# Patient Record
Sex: MALE | Race: WHITE
[De-identification: names, ages, dates, MRNs, and addresses within clinical notes are randomized per-mention and may not be internally consistent; named-entity substitution may affect disease eponyms.]

---

## 2017-01-08 ENCOUNTER — HOSPITAL ENCOUNTER (INPATIENT)
Dept: HOSPITAL 47 - EC | Age: 52
LOS: 11 days | Discharge: HOME | DRG: 885 | End: 2017-01-19
Payer: MEDICARE

## 2017-01-08 VITALS — BODY MASS INDEX: 31 KG/M2

## 2017-01-08 DIAGNOSIS — Z82.49: ICD-10-CM

## 2017-01-08 DIAGNOSIS — G47.30: ICD-10-CM

## 2017-01-08 DIAGNOSIS — F25.1: Primary | ICD-10-CM

## 2017-01-08 DIAGNOSIS — G47.00: ICD-10-CM

## 2017-01-08 DIAGNOSIS — K21.9: ICD-10-CM

## 2017-01-08 DIAGNOSIS — F42.9: ICD-10-CM

## 2017-01-08 DIAGNOSIS — F10.21: ICD-10-CM

## 2017-01-08 DIAGNOSIS — E11.9: ICD-10-CM

## 2017-01-08 DIAGNOSIS — R45.851: ICD-10-CM

## 2017-01-08 DIAGNOSIS — E78.5: ICD-10-CM

## 2017-01-08 DIAGNOSIS — E66.9: ICD-10-CM

## 2017-01-08 DIAGNOSIS — Z91.5: ICD-10-CM

## 2017-01-08 DIAGNOSIS — Z79.84: ICD-10-CM

## 2017-01-08 DIAGNOSIS — F41.9: ICD-10-CM

## 2017-01-08 DIAGNOSIS — G25.81: ICD-10-CM

## 2017-01-08 DIAGNOSIS — Z88.0: ICD-10-CM

## 2017-01-08 DIAGNOSIS — D64.9: ICD-10-CM

## 2017-01-08 DIAGNOSIS — I10: ICD-10-CM

## 2017-01-08 DIAGNOSIS — Z79.899: ICD-10-CM

## 2017-01-08 DIAGNOSIS — Z88.8: ICD-10-CM

## 2017-01-08 LAB
APAP SPEC-MCNC: <10 UG/ML
SALICYLATES SERPL-MCNC: <1 MG/DL

## 2017-01-08 PROCEDURE — 85025 COMPLETE CBC W/AUTO DIFF WBC: CPT

## 2017-01-08 PROCEDURE — 36415 COLL VENOUS BLD VENIPUNCTURE: CPT

## 2017-01-08 PROCEDURE — 83036 HEMOGLOBIN GLYCOSYLATED A1C: CPT

## 2017-01-08 PROCEDURE — 80306 DRUG TEST PRSMV INSTRMNT: CPT

## 2017-01-08 PROCEDURE — 84443 ASSAY THYROID STIM HORMONE: CPT

## 2017-01-08 PROCEDURE — 99285 EMERGENCY DEPT VISIT HI MDM: CPT

## 2017-01-08 PROCEDURE — 80053 COMPREHEN METABOLIC PANEL: CPT

## 2017-01-08 PROCEDURE — 82075 ASSAY OF BREATH ETHANOL: CPT

## 2017-01-08 PROCEDURE — 80178 ASSAY OF LITHIUM: CPT

## 2017-01-08 PROCEDURE — 93005 ELECTROCARDIOGRAM TRACING: CPT

## 2017-01-08 PROCEDURE — 83520 IMMUNOASSAY QUANT NOS NONAB: CPT

## 2017-01-08 NOTE — ED
General Adult HPI





- General


Chief complaint: Psychiatric Symptoms


Stated complaint: Mental Health-Petition


Time Seen by Provider: 01/08/17 19:35


Source: patient, police, EMS, RN notes reviewed, old records reviewed


Mode of arrival: EMS


Limitations: no limitations





- History of Present Illness


Initial comments: 





Chief complaint history of present illness a 51-year-old male here because he 

is depressed.  Patient's suicidal.  He had a knife to his own chest.  He called 

and spoke to assist her she told him not to hurt himself she called the police 

the police brought him here.  I have completed a certificate for admission 

because of the patient's complaint of being depressed and suicidal.  Patient 

denies taking any medications other than what is supposed to take.





- Related Data


 Home Medications











 Medication  Instructions  Recorded  Confirmed


 


Ferrous Sulfate [Iron (65  mg PO DAILY 01/08/17 01/08/17





Elemental)]   


 


Fluticasone Nasal Spray [Flonase 2 spr EA NOSTRIL DAILY 01/08/17 01/08/17





Nasal Spray]   


 


Loratadine 10 mg PO DAILY PRN 01/08/17 01/08/17


 


Propranolol [Inderal] 20 mg PO BID 01/08/17 01/08/17


 


Rosuvastatin Calcium [Crestor] 5 mg PO HS 01/08/17 01/08/17


 


busPIRone HCL [Buspar] 30 mg PO BID 01/08/17 01/08/17


 


cloZAPine [Clozaril] 100 mg PO HS 01/08/17 01/08/17


 


fluvoxaMINE MALEATE [Luvox CR] 150 mg PO BID 01/08/17 01/08/17


 


lamoTRIgine [LaMICtal] 100 mg PO DAILY 01/08/17 01/08/17


 


lamoTRIgine [LaMICtal] 150 mg PO HS 01/08/17 01/08/17


 


metFORMIN HCL [Glucophage] 500 mg PO BID 01/08/17 01/08/17


 


rOPINIRole HCL [Requip] 0.5 mg PO HS 01/08/17 01/08/17








 Previous Rx's











 Medication  Instructions  Recorded


 


Vivitrol  380 mg IM Q28D #1  01/20/16


 


Benztropine Mesylate [Cogentin] 1 mg PO HS #30 tab 10/19/16


 


Lisinopril [Zestril] 20 mg PO HS #30 tab 10/19/16


 


Ranitidine HCl [Zantac] 150 mg PO BID #60 tablet 10/19/16


 


amLODIPine [Norvasc] 5 mg PO BID #30 tab 10/19/16











 Allergies











Allergy/AdvReac Type Severity Reaction Status Date / Time


 


Penicillins Allergy  Unknown Verified 01/08/17 19:33





   Childhood  


 


risperidone [From Risperdal] Allergy  Unknown Verified 01/08/17 19:33














Review of Systems


ROS Statement: 


Those systems with pertinent positive or pertinent negative responses have been 

documented in the HPI.


Review of systems.  No complaint of any headache or visual acuity changes no 

chest pain or shortness of breath.  No GI/ complaints or problems.  

Psychologically the patient reports depressed and suicidal to the point where 

he had a knife to himself.  All systems were otherwise reviewed.





Past medical problems significant for non-insulin diabetes mellitus, GERD, 

hyperlipidemia hypertension sleep apnea.  The patient's surgeries hernia 

repair.  Family history father had heart attack and die.  Patient has ALLERGIES 

to risperidone and penicillin.  Patient nonsmoker nondrinker.


ROS Other: All systems not noted in ROS Statement are negative.





Past Medical History


Past Medical History: Diabetes Mellitus, GERD/Reflux, Hyperlipidemia, 

Hypertension, Sleep Apnea/CPAP/BIPAP


Additional Past Medical History / Comment(s): Family history of premature 

coronary artery disease. OCD.


History of Any Multi-Drug Resistant Organisms: None Reported


Past Surgical History: Hernia Repair


Past Anesthesia/Blood Transfusion Reactions: No Reported Reaction


Past Psychological History: Anxiety, Bipolar, Depression, Schizoaffective 

Disorder


Additional Psychological History / Comment(s): OCD


Smoking Status: Never smoker


Past Alcohol Use History: None Reported


Additional Past Alcohol Use History / Comment(s): Pt states, "I am a recovering 

alcoholic. I haven't had a drink in since Jan 2014."


Past Drug Use History: None Reported


Additional Drug Use History / Comment(s): denies substance abuse





- Past Family History


  ** Father


Family Medical History: Congestive Heart Failure (CHF), Diabetes Mellitus





  ** Mother


Family Medical History: Cancer





General Exam





- General Exam Comments


Initial Comments: 





General:


The patient is awake and alert, appears depressed, quiet.  He had a knife to 

his own chest threatening to hurt himself.  His sister talked him out of it 

while on the phone with her.  Vital signs show temperature 98.0 pulse 88 

respiratory rate 20 blood pressure 156/95.  Systolic eye stuck elevated the 

patient is distressed.


Eye:


Pupils are equal, round and reactive to light, extra-ocular movements are intact

; there is normal conjunctiva bilaterally. No signs of icterus. 


Ears, nose, mouth and throat:


There are moist mucous membranes and no oral lesions. 


Neck:


The neck is supple, there is no tenderness  . 


Cardiovascular:


There is a regular rate and rhythm. No murmur, rub or gallop is appreciated.


Respiratory:


Lungs are clear to auscultation, respirations are non-labored, breath sounds 

are equal. No wheezes, stridor, rales, or rhonchi.


Gastrointestinal:


Soft, non-distended, non-tender abdomen without masses or organomegaly noted. 

There is no rebound or guarding present. No CVA tenderness. Bowel sounds are 

unremarkable.


Back:


There is no tenderness to palpation in the midline. There is no obvious 

deformity. No rashes noted. 


Musculoskeletal:


Normal ROM, no tenderness, There is no pedal edema. There is no calf tenderness 

or swelling. Sensation intact. Pulses equal bilaterally 2+. 


Neurological:


CN II-XII intact, There are no obvious motor or sensory deficits. Coordination 

appears grossly intact. Speech is normal.


Skin:


Skin is warm and dry and no rashes or lesions are noted. 


Psychiatric:


Cooperative, flat affect, depressed.  Suicidal intent to stab himself.





Limitations: no limitations





Course


 Vital Signs











  01/08/17





  19:32


 


Temperature 98.0 F


 


Pulse Rate 88


 


Respiratory 20





Rate 


 


Blood Pressure 156/95


 


O2 Sat by Pulse 98





Oximetry 














Medical Decision Making





- Medical Decision Making





Medical decision making; the patient's negative for Tylenol or aspirin.  

Positive for tricyclic of the depressants.





Patient was evaluated by psychiatric nurse.  Petition was filled out by the 

placement.  The patient will be admitted to 3 . diagnosis of major depression 

recurrent.





- Lab Data


 Lab Results











  01/08/17 01/08/17 Range/Units





  20:01 21:30 


 


Salicylates  <1.0   mg/dL


 


Urine Opiates Screen   Not Detected  (NotDetected)  


 


Ur Oxycodone Screen   Not Detected  (NotDetected)  


 


Urine Methadone Screen   Not Detected  (NotDetected)  


 


Ur Propoxyphene Screen   Not Detected  (NotDetected)  


 


Acetaminophen  <10.0   ug/mL


 


Ur Barbiturates Screen   Not Detected  (NotDetected)  


 


U Tricyclic Antidepress   Detected H  (NotDetected)  


 


Ur Phencyclidine Scrn   Not Detected  (NotDetected)  


 


Ur Amphetamines Screen   Not Detected  (NotDetected)  


 


U Methamphetamines Scrn   Not Detected  (NotDetected)  


 


U Benzodiazepines Scrn   Not Detected  (NotDetected)  


 


Urine Cocaine Screen   Not Detected  (NotDetected)  


 


U Marijuana (THC) Screen   Not Detected  (NotDetected)  














Disposition


Clinical Impression: 


 Major depression, recurrent





Disposition: TRANSFER TO PSYCH HOSP/UNIT


Condition: Fair

## 2017-01-09 LAB
BASOPHILS # BLD AUTO: 0 K/UL (ref 0–0.2)
BASOPHILS NFR BLD AUTO: 0 %
CH: 31
CHCM: 36.2
EOSINOPHIL # BLD AUTO: 0.1 K/UL (ref 0–0.7)
EOSINOPHIL NFR BLD AUTO: 1 %
ERYTHROCYTE [DISTWIDTH] IN BLOOD BY AUTOMATED COUNT: 5.06 M/UL (ref 4.3–5.9)
ERYTHROCYTE [DISTWIDTH] IN BLOOD: 13.4 % (ref 11.5–15.5)
GLUCOSE BLD-MCNC: 107 MG/DL (ref 75–99)
GLUCOSE BLD-MCNC: 165 MG/DL (ref 75–99)
GLUCOSE BLD-MCNC: 167 MG/DL (ref 75–99)
GLUCOSE BLD-MCNC: 169 MG/DL (ref 75–99)
HCT VFR BLD AUTO: 43.4 % (ref 39–53)
HDW: 2.97
HEMOGLOBIN A1C: 6.5 % (ref 4.2–6.1)
HGB BLD-MCNC: 14.8 GM/DL (ref 13–17.5)
LUC NFR BLD AUTO: 1 %
LYMPHOCYTES # SPEC AUTO: 1.7 K/UL (ref 1–4.8)
LYMPHOCYTES NFR SPEC AUTO: 18 %
MCH RBC QN AUTO: 29.2 PG (ref 25–35)
MCHC RBC AUTO-ENTMCNC: 34 G/DL (ref 31–37)
MCV RBC AUTO: 85.8 FL (ref 80–100)
MONOCYTES # BLD AUTO: 0.5 K/UL (ref 0–1)
MONOCYTES NFR BLD AUTO: 5 %
NEUTROPHILS # BLD AUTO: 7 K/UL (ref 1.3–7.7)
NEUTROPHILS NFR BLD AUTO: 75 %
WBC # BLD AUTO: 0.11 10*3/UL
WBC # BLD AUTO: 9.4 K/UL (ref 3.8–10.6)
WBC (PEROX): 9.51

## 2017-01-09 RX ADMIN — PROPRANOLOL HYDROCHLORIDE SCH MG: 20 TABLET ORAL at 21:24

## 2017-01-09 RX ADMIN — Medication SCH MG: at 10:18

## 2017-01-09 RX ADMIN — FAMOTIDINE SCH MG: 20 TABLET, FILM COATED ORAL at 15:42

## 2017-01-09 RX ADMIN — ACETAMINOPHEN PRN ML: 160 SOLUTION ORAL at 05:43

## 2017-01-09 RX ADMIN — ATORVASTATIN CALCIUM SCH MG: 10 TABLET, FILM COATED ORAL at 21:22

## 2017-01-09 RX ADMIN — LISINOPRIL SCH MG: 20 TABLET ORAL at 21:23

## 2017-01-09 RX ADMIN — PROPRANOLOL HYDROCHLORIDE SCH MG: 20 TABLET ORAL at 10:18

## 2017-01-09 RX ADMIN — FLUTICASONE PROPIONATE SCH SPRAY: 50 SPRAY, METERED NASAL at 10:20

## 2017-01-09 NOTE — P.HP
Psychiatric H&P





- .


H&P Date: 17


History & Physical: 


IDENTIFYING DATA: He is a 51-year-old single  male who has a history 

of a schizoaffective disorder and obsessive-compulsive disorder. 





HISTORY OF PRESENT ILLNESS:  The police brought him to the emergency room at 

the behest of his sister.  He allegedly told her that he was going to stab 

himself in the stomach with a knife.  He told the EPS nurse that he cannot live 

without his parents who are both .  He is tired of having a mental 

illness and his life is not worth living and he "just wants to be time limited"

.  He told the EPS nurse that he went home he would kill himself.





I reviewed the medical record and interviewed Mr. Stoll.  He affirmed  the 

history provided to the EPS nurse i.e. that he called his sister and told her 

that he was going to stab himself with a knife.  He alleged that he was holding 

the knife against himself when he called his sister.  He complained of 

increasing depression and anxiety.  He specifically described feeling 

overwhelmed and hopeless over his compulsive  rituals.  He described a number 

of compulsions including cleaning/washing, checking, repeating rituals, ordering

/arranging and mental rituals.  His obsessions include  need for symmetry or 

exactness and somatic obsessions. 





He was unclear about a duration of the symptoms.  He alleged alleged that he 

has felt depressed since his discharge from this unit in 2016.  However

, according to the medication review note from [facility] dated 2016, he was "doing okay"; the provider wrote that 

his "affect was appropriate, his mood was somewhat anxious but he was also 

humorous at times there is no evidence of any psychosis."





During her interview he described continued thoughts of suicide and self-harm.  

He denied homicidal ideation.  He described "voices in my head" but denied 

experiences consistent with true auditory hallucinations.  He denied other 

psychotic symptoms such as thought insertion, thought broadcasting or thought 

control.





He denied the recent use of alcohol or other drugs get high, help him sleep or 

changes mood.





PAST PSYCHIATRIC HISTORY: According to the record he has had over 20 admissions 

to psychiatric facilities.  According to EMR, this is his fifth admission this 

unit since 2014.  He was last discharged from this unit on 10/24/2016 with 

a diagnosis of schizoaffective disorder and OCD.  He has at least 2 prior 

suicide attempts by overdose of medications.  He has no i history of nonlethal 

self-injurious behavior.  He is no history of violence.





PAST MEDICAL HISTORY: He is a history of type 2 diabetes, hypertension, 

hyperlipidemia, GERD and restless leg syndrome.. 





ALLERGIES: Penicillins, risperidone. 





SUBSTANCE USE HISTORY: He has a history of alcohol use problems and currently 

receiving monthly Vivitrol injections. Drug screen was negative for drugs of 

abuse including marijuana.  





Tobacco use: He denied the use of tobacco products 





FAMILY PSYCHIATRIC/SUBSTANCE USE HISTORY:  He is unaware of family history of 

mental or substance use problems.  





LEGAL HISTORY:He denied history of legal problems.  He has a payee but no 

guardian.  





SOCIAL HISTORY:He grew up in the [facility].  He has 2 sisters.  He 

attended special education and graduated from high school.  He last worked in 

 and is currently receiving social security income for disability.  He 

lives alone in an apartment.  He is not  and has no children.  





MENTAL STATUS EXAM:  He presented as a disheveled appearing 51-year-old 

unshaven  moderately obese  male.  He maintained eye contact and 

appeared to attend to the interview.  He had no distinguishing features or  

prominent physical abnormalities.  He had a blunted facial expression.  He was 

alert and oriented to person, place and time.  He showed psychomotor 

retardation but no abnormal involuntary movements.  He had a slow but steady 

gait.  His speech was spontaneous with decreased rate, rhythm and volume.  His 

affect was depressed and not reactive.  He describesd suicidal ideation and 

death wishes.  He denied homicidal ideation.  He described depressive 

cognitions including helplessness, hopelessness and worthlessness.  He 

described multiple obsessions and compulsions (see above).  He denied phobias, 

ideas of reference and did not express paranoid ideation.  His thinking was 

concrete but his associations were coherent and logical.  He denied 

hallucinations and did not appear to be responding to internal stimuli.  Global 

impression of intellect is below average.  He has limited awareness of his 

illness but accepts the need for mental health treatment.  





STRENGTHS:   Stable housing, stable income, strong involvement with mental 

health services 





WEAKNESSES:  Loss of parents, chronic mental illness 





IMPRESSION:  Is a 51-year-old male who has a well-established diagnosis was 

schizoaffective disorder and obsessive-compulsive disorder.  He has had 

multiple hospitalization the most recent 2 months prior to this admission.  He 

presented with increasing suicidal ideation and a purported attempt to stab 

self.  He appears depressed and expresses feelings of hopelessness and 

helplessness.  He is also describing multiple obsessions and compulsions that 

interfere with his ability to function.  He should best be treated on an 

inpatient basis with a combination of psychopharmacology and  multimodal 

therapy.  





PRINCIPLE DIAGNOSIS:  Schizoaffective disorder depressed type, obsessive-

compulsive disorder 





RECOMMENDATION:  Admitted to the inpatient psychiatric unit, suicide 

precautions with 15 minute checks.  Consolidate outpatient medication including 

BuSpar 30 mg by mouth twice a day, clozapine 100 mg at bedtime, Luvox 150 mg 

twice a day, Lamictal 100 mg daily and 150 mg at bedtime.  Encourage 

participation in therapeutic groups and activities.  Evaluate clinical status 

and response to treatment on a daily basis.





 Allergies











Allergy/AdvReac Type Severity Reaction Status Date / Time


 


Penicillins Allergy  Unknown Verified 17 00:18





   Childhood  


 


risperidone [From Risperdal] Allergy  Unknown Verified 17 00:18








 Vital Signs











Temp  98.3 F   17 00:20


 


Pulse  67   17 00:20


 


Resp  18   17 00:20


 


BP  133/83   17 00:20


 


Pulse Ox  98   17 00:20








 Intake & Output











 17





 18:59 06:59 18:59


 


Weight  98.2 kg 








 Laboratory Last Values











POC Glucose (mg/dL)  165 mg/dL (75-99)  H  17  05:46    


 


POC Glu Operater ID  Argelia Browne   17  05:46    


 


Salicylates  <1.0 mg/dL  17  20:01    


 


Urine Opiates Screen  Not Detected  (NotDetected)   17  21:30    


 


Ur Oxycodone Screen  Not Detected  (NotDetected)   17  21:30    


 


Urine Methadone Screen  Not Detected  (NotDetected)   17  21:30    


 


Ur Propoxyphene Screen  Not Detected  (NotDetected)   17  21:30    


 


Acetaminophen  <10.0 ug/mL  17  20:01    


 


Ur Barbiturates Screen  Not Detected  (NotDetected)   17  21:30    


 


U Tricyclic Antidepress  Detected  (NotDetected)  H  17  21:30    


 


Ur Phencyclidine Scrn  Not Detected  (NotDetected)   17  21:30    


 


Ur Amphetamines Screen  Not Detected  (NotDetected)   17  21:30    


 


U Methamphetamines Scrn  Not Detected  (NotDetected)   17  21:30    


 


U Benzodiazepines Scrn  Not Detected  (NotDetected)   17  21:30    


 


Urine Cocaine Screen  Not Detected  (NotDetected)   17  21:30    


 


U Marijuana (THC) Screen  Not Detected  (NotDetected)   17  21:30    











17 09:49





17 15:29

## 2017-01-09 NOTE — CONS
DATE OF CONSULTATION:  



REASON FOR CONSULTATION: Medical clearance. 



Patient is a 51-year-old admitted to psychiatric floor for acute 

psychosis. Patient denied any fever or chills. Patient denied any 

nausea, vomiting, abdominal pain. Patient does have diabetes mellitus. 

Blood sugars appear to be within normal limits. Hemoglobin A1c is 6.5, 

because of which I am recommending continuation of the same regimen.  



REVIEW OF SYSTEMS: 

CONSTITUTIONAL: No fever, no malaise, no fatigue. 

HEENT: No recent visual problems or hearing problems. Denied any sore 

throat.  

CARDIOVASCULAR: No chest pain, orthopnea, PND, no palpitations, no 

syncope.  

PULMONARY: No shortness of breath, no cough, no hemoptysis. 

GASTROINTESTINAL: No diarrhea, no nausea, no vomiting, no abdominal 

pain. Normoactive bowel sounds.  

NEUROLOGICAL: No headaches, no weakness, no numbness. 

HEMATOLOGICAL: Denies any bleeding or petechiae. 

GENITOURINARY: Denies any burning micturition, frequency, or urgency. 

MUSCULOSKELETAL/RHEUMATOLOGICAL: Denies any joint pain, swelling, or 

any muscle pain.  

ENDOCRINE: Denies any polyuria or polydipsia. 

PSYCHIATRY: Defer to psychiatrist.



The rest of the 14 point review of systems is negative. 



ALLERGIES: 

1. PENICILLIN. 

2. RISPERIDONE. 



PAST MEDICAL HISTORY: 

1. Diabetes mellitus. 

2. Hypertension. 

3. Hyperlipidemia. 

4. Sleep apnea; uses CPAP machine. 

5. Obsessive-compulsive disorder. 

6. Severe depression. 

7. Schizoaffective disorder. 



SOCIAL HISTORY: Patient used to be an alcoholic in the past. Denied 

any other drug abuse.  



FAMILY HISTORY: Father had congestive heart failure, diabetes 

mellitus. Mother had cancer.  



PHYSICAL EXAMINATION: 

VITAL SIGNS: Temperature 98.3, pulse of 64, respiratory rate of 18, 

blood pressure 138/95. Saturating at 98% on room air.  

GENERAL: The patient is alert and oriented x3, not in any acute 

distress. Well developed, well nourished.  

HEENT: Pupils are round and equally reacting to light. EOMI. No 

scleral icterus. No conjunctival pallor. Normocephalic, atraumatic. No 

pharyngeal erythema. No thyromegaly.  

CARDIOVASCULAR: S1 and S2 present. No murmurs, rubs, or gallops. 

PULMONARY: Chest is clear to auscultation, no wheezing or crackles. 

ABDOMEN: Soft, nontender, nondistended, normoactive bowel sounds. No 

palpable organomegaly.  

MUSCULOSKELETAL: No joint swelling or deformity. 

EXTREMITIES: No cyanosis, clubbing, or pedal edema. 

NEUROLOGICAL: Gross neurological examination did not reveal any focal 

deficits.  

SKIN: No rashes. 



LABORATORY DATA: Hemoglobin A1C is 6.5. CBC is essentially within 

normal limits.  



ASSESSMENT AND PLAN: 

1. Diabetes mellitus, type 2. With patient's present regimen, 

patient's blood sugars appear to be well-controlled. Continue with the 

same regimen.  

2. Hypertension. Patient's blood pressure is also very well-controlled 

with the present regimen. Continue with amlodipine, lisinopril and 

continue with propranolol.  

3. Obsessive-compulsive disorder. 

4. Sleep apnea. 

5. Gastroesophageal reflux disease. 

6. Hyperlipidemia. 



For above-mentioned chronic medical problems, I recommend to go ahead 

and continue his home medications.

## 2017-01-10 LAB
ALP SERPL-CCNC: 71 U/L (ref 38–126)
ALT SERPL-CCNC: 44 U/L (ref 21–72)
ANION GAP SERPL CALC-SCNC: 11 MMOL/L
AST SERPL-CCNC: 24 U/L (ref 17–59)
BUN SERPL-SCNC: 20 MG/DL (ref 9–20)
CALCIUM SPEC-MCNC: 10.2 MG/DL (ref 8.4–10.2)
CHLORIDE SERPL-SCNC: 103 MMOL/L (ref 98–107)
CO2 SERPL-SCNC: 28 MMOL/L (ref 22–30)
GLUCOSE BLD-MCNC: 101 MG/DL (ref 75–99)
GLUCOSE BLD-MCNC: 118 MG/DL (ref 75–99)
GLUCOSE BLD-MCNC: 169 MG/DL (ref 75–99)
GLUCOSE BLD-MCNC: 188 MG/DL (ref 75–99)
GLUCOSE SERPL-MCNC: 236 MG/DL (ref 74–99)
NON-AFRICAN AMERICAN GFR(MDRD): >60
POTASSIUM SERPL-SCNC: 3.7 MMOL/L (ref 3.5–5.1)
PROT SERPL-MCNC: 6.6 G/DL (ref 6.3–8.2)
SODIUM SERPL-SCNC: 142 MMOL/L (ref 137–145)

## 2017-01-10 RX ADMIN — FAMOTIDINE SCH MG: 20 TABLET, FILM COATED ORAL at 09:19

## 2017-01-10 RX ADMIN — PROPRANOLOL HYDROCHLORIDE SCH MG: 20 TABLET ORAL at 21:52

## 2017-01-10 RX ADMIN — ATORVASTATIN CALCIUM SCH MG: 10 TABLET, FILM COATED ORAL at 21:53

## 2017-01-10 RX ADMIN — Medication SCH MG: at 09:19

## 2017-01-10 RX ADMIN — FLUTICASONE PROPIONATE SCH SPRAY: 50 SPRAY, METERED NASAL at 09:18

## 2017-01-10 RX ADMIN — LISINOPRIL SCH MG: 20 TABLET ORAL at 21:53

## 2017-01-10 RX ADMIN — PROPRANOLOL HYDROCHLORIDE SCH MG: 20 TABLET ORAL at 09:18

## 2017-01-10 NOTE — P.PN
Progress Note - Text


CLINICAL PROBLEMS: Mr. Vicente complained of continued feelings of depression 

and death wishes.  He denied specific suicidal thoughts, intent or plan.  The 

death wishes take the form of tiredness over his chronic mental illness, 

depression and anxiety.  He reported feeling alone and missing the support of 

his father.





24 HOUR EVENTS:  The liaison to [facility] brought his 

monthly injection of Vivitrol 380 mg for administration today.  He has shown no 

self-harm behavior or behavioral dyscontrol.  He requested and received a when 

necessary dose of lorazepam at 2234 yesterday for insomnia.





EXAMINATION:  He presented as a casually groomed moderately obese middle-aged 

 male who was pleasant on approach.  He maintained eye contact and 

attended to the interview.  He had a depressed facial expression that did not 

change during the interview.  He showed psychomotor retardation but no abnormal 

involuntary movements.  His speech was slow with decreased rhythm and volume.  

He had no articulation difficulties.  His affect was depressed and not 

reactive.  He described death wishes but denied suicidal ideation.  He 

described depressive cognitions including hopelessness, helplessness and 

worthlessness.  His thinking was concrete but his associations were coherent 

and logical.  He denied hallucinations and did not appear to be responding to 

internal stimuli.





PERTINENT DATA: His serum glucoses morning was 236.  According to the 

consultant internist his hemoglobin A1c was 6.5 and the internist did not 

recommend change in his diabetic regimen.  The WBC and percent neutrophils were 

9.4 and 75% respectively on 01/09/2017.





ASSESSMENT:  He continues to feel depressed, hopeless and helpless.  He has 

death wishes but denies specific suicidal intent or plan.





PLAN: Continue Luvox 150 mg by mouth twice a day, Lamictal 100 mg daily and 150 

mg at bedtime, buspirone 30 mg twice a day and clozapine 100 mg at bedtime.  

Nursing administered 380 mg of physical trauma IM today.  Continue other 

medications including metformin 500 mg twice a day, Inderal 20 mg twice a day, 

Norvasc 10 mg daily, Lipitor 10 mg at bedtime, famotidine 40 mg daily, ferrous 

sulfate 325 mg daily, lisinopril 20 mg at bedtime and loratadine 10 mg daily.  

Continue suicide precautions with 15 minute checks.  Encourage participation in 

therapeutic groups and activities.  Evaluate clinical status and response to 

treatment on a daily basis.

## 2017-01-11 LAB
GLUCOSE BLD-MCNC: 131 MG/DL (ref 75–99)
GLUCOSE BLD-MCNC: 132 MG/DL (ref 75–99)
GLUCOSE BLD-MCNC: 143 MG/DL (ref 75–99)
GLUCOSE BLD-MCNC: 176 MG/DL (ref 75–99)

## 2017-01-11 RX ADMIN — FLUTICASONE PROPIONATE SCH SPRAY: 50 SPRAY, METERED NASAL at 09:21

## 2017-01-11 RX ADMIN — ATORVASTATIN CALCIUM SCH MG: 10 TABLET, FILM COATED ORAL at 21:27

## 2017-01-11 RX ADMIN — Medication SCH MG: at 09:20

## 2017-01-11 RX ADMIN — Medication PRN APPLIC: at 21:27

## 2017-01-11 RX ADMIN — FAMOTIDINE SCH MG: 20 TABLET, FILM COATED ORAL at 09:19

## 2017-01-11 RX ADMIN — ACETAMINOPHEN PRN ML: 160 SOLUTION ORAL at 01:11

## 2017-01-11 RX ADMIN — LISINOPRIL SCH MG: 20 TABLET ORAL at 21:27

## 2017-01-11 RX ADMIN — PROPRANOLOL HYDROCHLORIDE SCH MG: 20 TABLET ORAL at 21:27

## 2017-01-11 RX ADMIN — PROPRANOLOL HYDROCHLORIDE SCH MG: 20 TABLET ORAL at 09:26

## 2017-01-11 NOTE — P.PN
Progress Note - Text


CLINICAL PROBLEMS: Herpes 51-year-old male who has history of schizoaffective 

disorder and obsessive-compulsive disorder.  He presented to the unit with 

suicidal ideation and a plan to stab himself.





He complained of continued feelings depression and thoughts of suicide.  He 

stated several times during our interview that if he were discharged he would 

kill himself.  He also talked about "voices in my head".  His description of 

his experiences was not consistent with true auditory hallucinations.





24 HOUR EVENTS: He has not demonstrated self-harm behavior or episodes of 

behavioral dyscontrol





EXAMINATION:  He presented as a casually groomed moderately obese 51-year-old 

male with an unkept beard.  He was pleasant on approach, attended the interview 

and maintained eye contact.  He had a blunted and depressed facial expression.  

He was alert and oriented to person, place and time.  He had psychomotor 

retardation but no abnormal involuntary movements.  His gait was slow and 

steady.  His speech was spontaneous with decreased rate, rhythm and volume.  He 

had no articulation difficulties.  His affect was depressed and not reactive.  

He describes suicidal ideation and death wishes.  He described depressive 

cognitions such as hopelessness, helplessness and worthlessness.  He denied 

ideas of reference did not express paranoid ideation.  His thinking was 

concrete but his associations were coherent and logical.  He described "voices" 

but did description of this experience was not consistent which auditory 

hallucinations.





PERTINENT DATA: He slept 5 hours last night





ASSESSMENT:  He is compliant with medication and treatment but continues to 

complain of depression and suicidal ideation.





PLAN: Continue current medications (see MAR), consider addition of a second 

antidepressant or another mood stabilizer to augment the antidepressant effect 

of Luvox, continue participation in therapeutic groups and activities, evaluate 

clinical status response to treatment on a daily basis.

## 2017-01-12 LAB
GLUCOSE BLD-MCNC: 106 MG/DL (ref 75–99)
GLUCOSE BLD-MCNC: 118 MG/DL (ref 75–99)
GLUCOSE BLD-MCNC: 146 MG/DL (ref 75–99)
GLUCOSE BLD-MCNC: 189 MG/DL (ref 75–99)

## 2017-01-12 RX ADMIN — PROPRANOLOL HYDROCHLORIDE SCH MG: 20 TABLET ORAL at 21:40

## 2017-01-12 RX ADMIN — PROPRANOLOL HYDROCHLORIDE SCH MG: 20 TABLET ORAL at 09:03

## 2017-01-12 RX ADMIN — FLUTICASONE PROPIONATE SCH SPRAY: 50 SPRAY, METERED NASAL at 09:30

## 2017-01-12 RX ADMIN — ATORVASTATIN CALCIUM SCH MG: 10 TABLET, FILM COATED ORAL at 21:38

## 2017-01-12 RX ADMIN — Medication SCH MG: at 09:04

## 2017-01-12 RX ADMIN — LISINOPRIL SCH MG: 20 TABLET ORAL at 21:40

## 2017-01-12 RX ADMIN — FAMOTIDINE SCH MG: 20 TABLET, FILM COATED ORAL at 09:03

## 2017-01-12 NOTE — P.PN
Progress Note - Text


CLINICAL PROBLEMS: He is 51-year-old man with history of a schizoaffective 

disorder and an obsessive-compulsive disorder.  He presented to unit with 

complaints of increased depression and suicidal ideation.





24 HOUR EVENTS:  He has complained the staff of continued thoughts of suicide.  

He has demonstrated no self-harm behavior or behavioral dyscontrol.  His been 

compliant with prescribed psychotropic medications.  Reported less redness and 

chapping of his hands since he began using the Eucerin cream.  He described 

continued compulsive behaviors, e.g. he stated he took him 15 minutes to put on 

his trousers this morning because he had a check and recheck the trousers for 

holes.





EXAMINATION:  He presented as a casually dressed and groomed middle-aged 

 male who was pleasant on approach.  His hands where red and chapped.  

He made eye contact and attended to the interview.  He had a depressed facial 

expression.  He was alert and oriented to person, place and time.  He had 

psychomotor retardation without abnormal movements.  His speech was slow with 

decreased rhythm, rate and volume.  He had no articulation difficulties.  His 

affect was depressed and not reactive.  He describes suicidal ideation but 

denied intent or plan.  He expressed depressive cognitions including 

helplessness, hopelessness and worthlessness.  He denied ideas of reference and 

did not express paranoid ideation.  His thinking was concrete and associations 

are coherent and logical.





PERTINENT DATA: His POC glucose have been elevated and range of 106-189.





ASSESSMENT:  He continues to complain of depression, hopelessness and 

helplessness.  He continues describes suicidal thoughts and compulsive 

behaviors.





PLAN: Continue BuSpar 30 mg twice a day, clozapine 100 mg at bedtime, Luvox 150 

mg twice a day and Lamictal 100 mg daily and 150 mg at bedtime.  Consider 

adding a second antidepressant  lithium for augmentation of his antidepression 

regimen.  Continue lorazepam 1 mg by mouth every 8 hours when necessary for 

agitation or acute anxiety.  Continue medications for his hypertension, 

hyperlipidemia, GERD, anemia and diabetes as recommended by the consultant 

internist.  Encourage participation in therapeutic groups and activities.  

Evaluate clinical status and response to treatment on a daily basis.

## 2017-01-13 LAB
GLUCOSE BLD-MCNC: 108 MG/DL (ref 75–99)
GLUCOSE BLD-MCNC: 125 MG/DL (ref 75–99)
GLUCOSE BLD-MCNC: 169 MG/DL (ref 75–99)

## 2017-01-13 RX ADMIN — PROPRANOLOL HYDROCHLORIDE SCH MG: 20 TABLET ORAL at 21:32

## 2017-01-13 RX ADMIN — LITHIUM CARBONATE SCH MG: 300 CAPSULE, GELATIN COATED ORAL at 21:30

## 2017-01-13 RX ADMIN — FAMOTIDINE SCH MG: 20 TABLET, FILM COATED ORAL at 09:20

## 2017-01-13 RX ADMIN — Medication SCH MG: at 09:20

## 2017-01-13 RX ADMIN — PROPRANOLOL HYDROCHLORIDE SCH MG: 20 TABLET ORAL at 09:21

## 2017-01-13 RX ADMIN — ATORVASTATIN CALCIUM SCH MG: 10 TABLET, FILM COATED ORAL at 21:28

## 2017-01-13 RX ADMIN — LISINOPRIL SCH MG: 20 TABLET ORAL at 21:32

## 2017-01-13 RX ADMIN — FLUTICASONE PROPIONATE SCH SPRAY: 50 SPRAY, METERED NASAL at 09:20

## 2017-01-13 NOTE — P.PN
Progress Note - Text


CLINICAL PROBLEMS: Ovi complained of "feeling the same" he stated that he 

continues to feel depressed and hopeless.  Again he stated that if he were 

discharge he would "stab myself in the heart."  We discussed treatment options 

and he agreed to a trial of lithium.  He stated that he had been prescribed 

lithium in the past and thought that it may have helped.  Again, he described 

checking rituals.





24 HOUR EVENTS:  He has demonstrated no self-harm behavior or behavioral 

dyscontrol.  He has been attending therapeutic groups and activities.  He has 

been compliant with prescribed medications.





EXAMINATION:  He presented as a casually groomed 51-year-old  male 

with a hospital gown over his casual clothes.  He was pleasant on approach but 

did not appear to attend to the interview.  Several times he asked me to repeat 

myself.  At times she appeared to internally preoccupied.  He had a depressed 

facial expression.  He is alert and oriented to person, place and time.  He has 

psychomotor retardation but no abnormal movements.  His speech was spontaneous 

with decreased rate, rhythm and volume.  He had no articulation difficulties.  

His affect was depressed and not reactive.  He describes suicidal ideation and 

death wishes.  He expressed depressive cognitions including helplessness, 

hopelessness and worthlessness.  He denied ideas of reference and did not 

express paranoid ideation.  His thinking is concrete but his associations are 

coherent and logical.  He described experiencing "voices in my head" but his 

description of his experience was not consistent with true auditory 

hallucinations.  These appeared to be dystonic thoughts that have a negative 

theme.





PERTINENT DATA: He slept 6 hours last night and requested when necessary Ativan 

at 12 midnight for complaints of initial insomnia.  His hands appear dry and 

chapped.  Much of the erythema has resolved.





ASSESSMENT:  He continues to complain of depression and suicidal ideation.  His 

overall clinical status appears unchanged from admission.





PLAN: Begin lithium carbonate 300 mg by mouth twice a day, check lithium level 

at steady state and titrated according to clinical response and tolerance.  

Continue BuSpar 30 mg twice a day, Clozaril 100 mg at bedtime, Luvox 150 mg 

twice a day and neck 100 mg daily and 150 mg at bedtime and Vivitrol 380 mg IM 

monthly.  Continue other medications for hypertension, GERD, anemia, ALLERGY 

and diabetes as prescribed.

## 2017-01-14 LAB
GLUCOSE BLD-MCNC: 123 MG/DL (ref 75–99)
GLUCOSE BLD-MCNC: 126 MG/DL (ref 75–99)
GLUCOSE BLD-MCNC: 171 MG/DL (ref 75–99)
GLUCOSE BLD-MCNC: 197 MG/DL (ref 75–99)

## 2017-01-14 RX ADMIN — LITHIUM CARBONATE SCH MG: 300 CAPSULE, GELATIN COATED ORAL at 09:00

## 2017-01-14 RX ADMIN — ACETAMINOPHEN PRN ML: 160 SOLUTION ORAL at 02:21

## 2017-01-14 RX ADMIN — PROPRANOLOL HYDROCHLORIDE SCH MG: 20 TABLET ORAL at 09:03

## 2017-01-14 RX ADMIN — ATORVASTATIN CALCIUM SCH MG: 10 TABLET, FILM COATED ORAL at 21:51

## 2017-01-14 RX ADMIN — FLUTICASONE PROPIONATE SCH SPRAY: 50 SPRAY, METERED NASAL at 09:03

## 2017-01-14 RX ADMIN — FAMOTIDINE SCH MG: 20 TABLET, FILM COATED ORAL at 09:01

## 2017-01-14 RX ADMIN — LITHIUM CARBONATE SCH MG: 300 CAPSULE, GELATIN COATED ORAL at 21:51

## 2017-01-14 RX ADMIN — FAMOTIDINE SCH MG: 20 TABLET, FILM COATED ORAL at 21:52

## 2017-01-14 RX ADMIN — Medication SCH MG: at 09:01

## 2017-01-14 RX ADMIN — PROPRANOLOL HYDROCHLORIDE SCH MG: 20 TABLET ORAL at 21:52

## 2017-01-14 RX ADMIN — LISINOPRIL SCH MG: 20 TABLET ORAL at 21:51

## 2017-01-14 NOTE — P.PN
Progress Note - Text





Interval history: The patient is found in group he follows me to an interview 

room.  He is being seen today in coverage for Dr. Howe.  The patient has a 

history of schizoaffective disorder.  He is currently treated with Clozaril 

BuSpar Luvox and now lithium.  He does receive a monthly Vivitrol injection.  

He reports his mood is terrible.  He states he continues to have suicidal 

ideation.  He endorses symptoms of anxiety.  He has recently been started on 

lithium.  He has no questions regarding this medication.  In terms of psychosis 

he states he always hears his own voice telling him to kill himself.  He states 

this voice usually does not diminish with antipsychotic medication.





Mental status exam: The patient is an overweight  male he is balding 

he is dressed in his own clothing that appears oversized.  He is disheveled.  

Eye contact is appropriate speech is fluent spontaneous nonpressured.  He 

describes a "terrible" mood with ongoing suicidal ideation.  Affect is blunted 

to flat.  He reports no homicidal ideation, he endorses an auditory 

hallucination that has chronically been present.  No visual hallucinations.  He 

reports feeling safe in the hospital.  Insight and judgment limited.  He 

demonstrates no verbal or physical aggressiveness.





Plan: The patient will continue on his current psychotropic medications.  We 

will give consideration to titrating the Clozaril further.  Vital signs 

reviewed.  He is encouraged to participate in the milieu we will continue to 

monitor him for safety.

## 2017-01-15 LAB
GLUCOSE BLD-MCNC: 120 MG/DL (ref 75–99)
GLUCOSE BLD-MCNC: 124 MG/DL (ref 75–99)
GLUCOSE BLD-MCNC: 187 MG/DL (ref 75–99)
GLUCOSE BLD-MCNC: 221 MG/DL (ref 75–99)

## 2017-01-15 RX ADMIN — ATORVASTATIN CALCIUM SCH MG: 10 TABLET, FILM COATED ORAL at 21:25

## 2017-01-15 RX ADMIN — Medication SCH MG: at 09:03

## 2017-01-15 RX ADMIN — LISINOPRIL SCH MG: 20 TABLET ORAL at 21:30

## 2017-01-15 RX ADMIN — FAMOTIDINE SCH MG: 20 TABLET, FILM COATED ORAL at 21:26

## 2017-01-15 RX ADMIN — PROPRANOLOL HYDROCHLORIDE SCH MG: 20 TABLET ORAL at 09:05

## 2017-01-15 RX ADMIN — FLUTICASONE PROPIONATE SCH SPRAY: 50 SPRAY, METERED NASAL at 09:04

## 2017-01-15 RX ADMIN — FAMOTIDINE SCH MG: 20 TABLET, FILM COATED ORAL at 09:03

## 2017-01-15 RX ADMIN — LITHIUM CARBONATE SCH MG: 300 CAPSULE, GELATIN COATED ORAL at 09:05

## 2017-01-15 RX ADMIN — LITHIUM CARBONATE SCH MG: 300 CAPSULE, GELATIN COATED ORAL at 21:29

## 2017-01-15 RX ADMIN — PROPRANOLOL HYDROCHLORIDE SCH MG: 20 TABLET ORAL at 21:30

## 2017-01-15 NOTE — P.PN
Progress Note - Text





Interval history: The patient is found in his room he follows me to an 

interview room.  He states his mood is the same and indicates that is depressed 

with ongoing suicidal thoughts.  He quantifies the severity of his suicidal 

thoughts as a 7-8 out of 10 with 10 being the worst or most severe.  He did 

sleep about 4-5 hours last evening.  Appetite is stable.  He states a dietitian 

came up and recommended snacks in between meals.  He has been selectively 

attending groups.  He has no questions or concerns regarding his current 

psychotropic medications.





Mental status exam: The patient is an overweight  male appearing his 

stated age.  Eye contact is appropriate he reports a depressed mood with 

ongoing suicidal thoughts he has a blunted affect.  He has little spontaneous 

speech and provides brief answers to questions asked.  He is reporting no 

homicidal ideation.  He does not appear hypomanic or manic.  Insight and 

judgment limited.  There is no verbal or physical aggressiveness.  He remains 

oriented to person place and date.  He states he chronically experiences and 

auditory hallucination.





Plan: The patient will continue on his current psychotropic medications we will 

monitor him for safety, encourage his participation in the milieu.  Vital signs 

reviewed.  I will confer with nursing regarding dietaries input and 

recommendations.  Dr. Howe will resume care of this patient starting 

tomorrow.

## 2017-01-16 LAB
GLUCOSE BLD-MCNC: 118 MG/DL (ref 75–99)
GLUCOSE BLD-MCNC: 121 MG/DL (ref 75–99)
GLUCOSE BLD-MCNC: 143 MG/DL (ref 75–99)
GLUCOSE BLD-MCNC: 146 MG/DL (ref 75–99)
GLUCOSE BLD-MCNC: 165 MG/DL (ref 75–99)

## 2017-01-16 RX ADMIN — LITHIUM CARBONATE SCH MG: 300 CAPSULE, GELATIN COATED ORAL at 21:24

## 2017-01-16 RX ADMIN — FAMOTIDINE SCH MG: 20 TABLET, FILM COATED ORAL at 21:22

## 2017-01-16 RX ADMIN — FLUTICASONE PROPIONATE SCH SPRAY: 50 SPRAY, METERED NASAL at 09:07

## 2017-01-16 RX ADMIN — LISINOPRIL SCH MG: 20 TABLET ORAL at 21:24

## 2017-01-16 RX ADMIN — LITHIUM CARBONATE SCH MG: 300 CAPSULE, GELATIN COATED ORAL at 09:05

## 2017-01-16 RX ADMIN — FAMOTIDINE SCH MG: 20 TABLET, FILM COATED ORAL at 09:04

## 2017-01-16 RX ADMIN — Medication SCH MG: at 09:04

## 2017-01-16 RX ADMIN — PROPRANOLOL HYDROCHLORIDE SCH MG: 20 TABLET ORAL at 09:04

## 2017-01-16 RX ADMIN — ATORVASTATIN CALCIUM SCH MG: 10 TABLET, FILM COATED ORAL at 21:21

## 2017-01-16 RX ADMIN — PROPRANOLOL HYDROCHLORIDE SCH MG: 20 TABLET ORAL at 21:24

## 2017-01-16 NOTE — P.PN
Progress Note - Text


SUBJECTIVE:  Mr. Vicente complained of continued stay feelings of depression and 

anxiety.  He talked about having "thoughts of suicide".  When I inquired about 

the nature of the suicidal thoughts he replied that he has been thinking that 

if he were discharged he might commit suicide.





We talked about his living situation and social supports.  He appeared 

receptive to an [facility] home.





He denied side effects to the initial dose of lithium carbonate 300 mg twice a 

day.





OBJECTIVE:   He presented as a casually groomed bearded 51-year-old  

male.  He had a stooped posture.  He made eye contact and appeared to attend to 

the interview.  He had a sad facial expression.  He was alert and oriented to 

person place and time.  He showed slight psychomotor retardation but no 

abnormal movements.  His speech was spontaneous with decreased rhythm and 

volume.  His affect was depressed but reactive.  He complained of anxiety did 

not appear anxious or uncomfortable.  He described death wishes but denied 

suicidal intent or plan.  He expressed continued feelings of hopelessness, 

helplessness and worthlessness.  He complained of continued obsessional 

behavior including checking.  He denied phobias or ideas reference.  He didn't 

express paranoid ideation.  His thinking was concrete but his associations were 

coherent and logical.  He denied hallucinations and did not appear to be 

responding to internal stimuli.





His hands appeared were dry and had some cracking but they were much less 

erythematous than on admission.  He slept 5 hours last night





ASSESSMENT:  The severity of the skin irritation of his hands from his frequent 

handwashing has improved.  He continues to.  Her depressed and complaining of 

death wishes.  Overall, he is shown modest improvement from his presentation on 

admission.





PLAN: Continue current medications including clozapine 100 mg at bedtime, 

Lamictal 100 mg daily and 150 mg at bedtime, lithium carbonate 300 mg twice a 

day Luvox 150 mg twice a day and Vivitrol 3 mg monthly.  Obtain serum lithium 

level tomorrow morning.   to discuss referral to an [facility] home.  

Evaluate clinical status and response to treatment on a daily basis.  

Encouraged continued participation in therapeutic groups and activities.

## 2017-01-17 LAB
BASOPHILS # BLD AUTO: 0 K/UL (ref 0–0.2)
BASOPHILS NFR BLD AUTO: 0 %
CH: 30.8
CHCM: 36
EOSINOPHIL # BLD AUTO: 0.1 K/UL (ref 0–0.7)
EOSINOPHIL NFR BLD AUTO: 1 %
ERYTHROCYTE [DISTWIDTH] IN BLOOD BY AUTOMATED COUNT: 5 M/UL (ref 4.3–5.9)
ERYTHROCYTE [DISTWIDTH] IN BLOOD: 13 % (ref 11.5–15.5)
GLUCOSE BLD-MCNC: 118 MG/DL (ref 75–99)
GLUCOSE BLD-MCNC: 125 MG/DL (ref 75–99)
GLUCOSE BLD-MCNC: 196 MG/DL (ref 75–99)
GLUCOSE BLD-MCNC: 201 MG/DL (ref 75–99)
HCT VFR BLD AUTO: 42.9 % (ref 39–53)
HDW: 3.04
HGB BLD-MCNC: 14.7 GM/DL (ref 13–17.5)
LUC NFR BLD AUTO: 1 %
LYMPHOCYTES # SPEC AUTO: 2.1 K/UL (ref 1–4.8)
LYMPHOCYTES NFR SPEC AUTO: 18 %
MCH RBC QN AUTO: 29.3 PG (ref 25–35)
MCHC RBC AUTO-ENTMCNC: 34.2 G/DL (ref 31–37)
MCV RBC AUTO: 85.9 FL (ref 80–100)
MONOCYTES # BLD AUTO: 0.8 K/UL (ref 0–1)
MONOCYTES NFR BLD AUTO: 7 %
NEUTROPHILS # BLD AUTO: 8.4 K/UL (ref 1.3–7.7)
NEUTROPHILS NFR BLD AUTO: 73 %
WBC # BLD AUTO: 0.13 10*3/UL
WBC # BLD AUTO: 11.6 K/UL (ref 3.8–10.6)
WBC (PEROX): 11.61

## 2017-01-17 RX ADMIN — PROPRANOLOL HYDROCHLORIDE SCH MG: 20 TABLET ORAL at 21:26

## 2017-01-17 RX ADMIN — Medication SCH MG: at 09:08

## 2017-01-17 RX ADMIN — FAMOTIDINE SCH MG: 20 TABLET, FILM COATED ORAL at 09:08

## 2017-01-17 RX ADMIN — FLUTICASONE PROPIONATE SCH SPRAY: 50 SPRAY, METERED NASAL at 10:15

## 2017-01-17 RX ADMIN — LITHIUM CARBONATE SCH MG: 300 CAPSULE, GELATIN COATED ORAL at 21:25

## 2017-01-17 RX ADMIN — LITHIUM CARBONATE SCH MG: 300 CAPSULE, GELATIN COATED ORAL at 09:09

## 2017-01-17 RX ADMIN — PROPRANOLOL HYDROCHLORIDE SCH MG: 20 TABLET ORAL at 09:09

## 2017-01-17 RX ADMIN — ATORVASTATIN CALCIUM SCH MG: 10 TABLET, FILM COATED ORAL at 21:22

## 2017-01-17 RX ADMIN — LISINOPRIL SCH MG: 20 TABLET ORAL at 21:25

## 2017-01-17 RX ADMIN — FAMOTIDINE SCH MG: 20 TABLET, FILM COATED ORAL at 21:23

## 2017-01-18 LAB
GLUCOSE BLD-MCNC: 115 MG/DL (ref 75–99)
GLUCOSE BLD-MCNC: 161 MG/DL (ref 75–99)
GLUCOSE BLD-MCNC: 173 MG/DL (ref 75–99)
GLUCOSE BLD-MCNC: 175 MG/DL (ref 75–99)

## 2017-01-18 RX ADMIN — LISINOPRIL SCH MG: 20 TABLET ORAL at 21:11

## 2017-01-18 RX ADMIN — FAMOTIDINE SCH MG: 20 TABLET, FILM COATED ORAL at 09:04

## 2017-01-18 RX ADMIN — LITHIUM CARBONATE SCH MG: 300 CAPSULE, GELATIN COATED ORAL at 21:11

## 2017-01-18 RX ADMIN — ATORVASTATIN CALCIUM SCH MG: 10 TABLET, FILM COATED ORAL at 21:09

## 2017-01-18 RX ADMIN — FAMOTIDINE SCH MG: 20 TABLET, FILM COATED ORAL at 21:10

## 2017-01-18 RX ADMIN — FLUTICASONE PROPIONATE SCH SPRAY: 50 SPRAY, METERED NASAL at 09:05

## 2017-01-18 RX ADMIN — Medication PRN APPLIC: at 21:13

## 2017-01-18 RX ADMIN — LITHIUM CARBONATE SCH MG: 300 CAPSULE, GELATIN COATED ORAL at 09:09

## 2017-01-18 RX ADMIN — PROPRANOLOL HYDROCHLORIDE SCH MG: 20 TABLET ORAL at 21:11

## 2017-01-18 RX ADMIN — PROPRANOLOL HYDROCHLORIDE SCH MG: 20 TABLET ORAL at 09:09

## 2017-01-18 RX ADMIN — Medication SCH MG: at 09:05

## 2017-01-18 NOTE — P.PN
Progress Note - Text


SUBJECTIVE:  Ovi stated that he feels "a little bit" less depressed and 

anxious.  However, he continues to have concerns about returning to his 

apartment.  "I don't know what I might do when I leave."  He denied 

experiencing current suicidal thoughts and denied plan or intent.  He remains 

interested in an [facility] home.  He slept 6 hours last night.





OBJECTIVE:   He presented as a casually groomed 51-year-old  male with 

a short unkept beard. He maintained eye contact and attended to the interview.  

He had a sad facial expression.  He is alert and oriented to person, place and 

time.  He showed no abnormality of psychomotor activity and no abnormal 

involuntary movements.  His affect was depressed but more reactive than on  

previous encounters..  He did not describe suicidal ideation.  He Denied 

feeling hopeless and helpless.  He described continued compulsive behaviors 

including cleaning and checking but the behaviors do not interfere with his 

functioning on the unit.  He denied ideas of reference and did not expressed 

paranoid ideation.  His thinking was abstract and associations were coherent 

and logical.  He expressed frequent depressive themes.  He denied 

hallucinations and did not appear to be responding to internal stimuli.





His [facility] team has not replied to our inquire about [facility] placement





ASSESSMENT:  Overall he appears moderately improved from admission.  He feels 

less depressed and anxious..  He is not having active suicidal thoughts or 

ideation.  There is no evidence of psychosis. .





PLAN: Continue lithium 3 mg by mouth twice a day, lithium level tomorrow morning

, continue other medications as prescribed including clozapine 100 mg at bedtime

, Luvox 150 mg twice a day, Lamictal 100 mg daily and 150 mg at bedtime, 

individual 3 mg IM monthly and lorazepam 1 mg by mouth every 8 hours when 

necessary for anxiety.  The  [facility] liaison to discuss [facility] placement with his 

outpatient treatment team

## 2017-01-19 VITALS
TEMPERATURE: 97.6 F | SYSTOLIC BLOOD PRESSURE: 116 MMHG | DIASTOLIC BLOOD PRESSURE: 77 MMHG | RESPIRATION RATE: 20 BRPM | HEART RATE: 76 BPM

## 2017-01-19 LAB
GLUCOSE BLD-MCNC: 115 MG/DL (ref 75–99)
GLUCOSE BLD-MCNC: 141 MG/DL (ref 75–99)
GLUCOSE BLD-MCNC: 220 MG/DL (ref 75–99)

## 2017-01-19 RX ADMIN — LITHIUM CARBONATE SCH MG: 300 CAPSULE, GELATIN COATED ORAL at 09:28

## 2017-01-19 RX ADMIN — Medication SCH MG: at 09:28

## 2017-01-19 RX ADMIN — PROPRANOLOL HYDROCHLORIDE SCH MG: 20 TABLET ORAL at 09:28

## 2017-01-19 RX ADMIN — FAMOTIDINE SCH MG: 20 TABLET, FILM COATED ORAL at 09:28

## 2017-01-19 RX ADMIN — FLUTICASONE PROPIONATE SCH SPRAY: 50 SPRAY, METERED NASAL at 09:27

## 2017-01-19 NOTE — P.DS
Providers


Date of admission: 


17 23:52





Attending physician: 


Tae Howe MD





Consults: 





 





17 00:04


Consult Physician Routine 


   Consulting Provider: Aleksey Brandt


   Consult Reason/Comments: medical management


   Do you want consulting provider notified?: Yes, Notify in am











Primary care physician: 


Deep Fongbal








- Discharge Diagnosis(es)


(1) Obsessive compulsive disorder


Current Visit: Yes   Status: Chronic   Priority: High   





(2) Schizoaffective disorder, depressive type


Current Visit: Yes   Status: Chronic   Priority: High   





(3) Alcohol use disorder, severe, in sustained remission


Current Visit: Yes   Status: Chronic   Priority: Medium   





(4) Suicidal ideation


Current Visit: No   Status: Resolved   Priority: Low   


Hospital Course: 


IDENTIFYING DATA: He is a 51-year-old single  male who has a history 

of a schizoaffective disorder and obsessive-compulsive disorder. 





HISTORY OF PRESENT ILLNESS:  The police brought him to the emergency room at 

the behest of his sister.  He allegedly told her that he was going to stab 

himself in the stomach with a knife.  He told the EPS nurse that he cannot live 

without his parents who are both .  He is tired of having a mental 

illness and his life is not worth living and he "just wants to be time limited"

.  He told the EPS nurse that he went home he would kill himself.





I reviewed the medical record and interviewed Mr. Stoll.  He affirmed  the 

history provided to the EPS nurse i.e. that he called his sister and told her 

that he was going to stab himself with a knife.  He alleged that he was holding 

the knife against himself when he called his sister.  He complained of 

increasing depression and anxiety.  He specifically described feeling 

overwhelmed and hopeless over his compulsive  rituals.  He described a number 

of compulsions including cleaning/washing, checking, repeating rituals, ordering

/arranging and mental rituals.  His obsessions include  need for symmetry or 

exactness and somatic obsessions. 





He was unclear about a duration of the symptoms.  He alleged alleged that he 

has felt depressed since his discharge from this unit in 2016.  However

, according to the medication review note from [facility] dated 2016, he was "doing okay"; the provider wrote that 

his "affect was appropriate, his mood was somewhat anxious but he was also 

humorous at times there is no evidence of any psychosis."





During her interview he described continued thoughts of suicide and self-harm.  

He denied homicidal ideation.  He described "voices in my head" but denied 

experiences consistent with true auditory hallucinations.  He denied other 

psychotic symptoms such as thought insertion, thought broadcasting or thought 

control.





He denied the recent use of alcohol or other drugs get high, help him sleep or 

changes mood.





PAST PSYCHIATRIC HISTORY: According to the record he has had over 20 admissions 

to psychiatric facilities.  According to EMR, this is his fifth admission this 

unit since 2014.  He was last discharged from this unit on 10/24/2016 with 

a diagnosis of schizoaffective disorder and OCD.  He has at least 2 prior 

suicide attempts by overdose of medications.  He has no i history of nonlethal 

self-injurious behavior.  He is no history of violence.





HOSPITAL COURSE: We admitted him to the psychiatric unit under care of this 

writer.  We provided a biopsychosocial assessment.  The consultant internist 

completed the initial physical exam and medical history.  The consultant 

recommended to continue his current outpatient treatment for his diabetes and 

hypertension. We only changed the amlodipine from 5 mg twice a day to 10 mg 

daily. He had moderately severe chapping of his hands from frequent 

handwashing.    We resumed his other medications including Lipitor 10 mg at 

bedtime, buspirone 30 mg twice a day, clozapine 100 mg at bedtime, Luvox 150 mg 

twice a day, Lamictal 100 mg daily and 150 mg at bedtime, and lisinopril 10 mg 

at bedtime.  His weekly WBC and absolute neutrophil counts were within normal 

limits.  The chapping of his hands improves with Eucerin cream.  Nursing 

reported that he did not engage in sustained compulsive behaviors while on the 

unit. He complained of persistent feelings of depression and thoughts of 

suicide despite treatment with an adequate dose of an antidepressant and a mood 

stabilizing agent.  We discussed treatment options and agreed to a trial of 

lithium carbonate.  At a dose of 300 mg twice he had an improvement in his mood

; his lithium level from 2017 was 0.3.  During our interviews we 

discussed the benefits of living alone versus living in a group setting.  He 

volunteered that he lived in a group home before and would consider a group 

home again.  We discussed placement with the [facility] liaison.  His outpatient 

treatment team concurred with temporary placement in a group home and the [facility] 

identified an opening at the [facility].  Mr. Vicente agreed to placement at 

the [facility].  At the time of discharge his affect was bright.  His 

thinking was organized, coherent and goal directed.  He denied suicidal ideation

, intent or plan.  He continued to perseverate about his illness and the 

possibility of relapse and need for further treatment.





Patient Condition at Discharge: Fair





Plan - Discharge Summary


New Discharge Prescriptions: 


Ferrous Sulfate [Iron (65 MG Elemental)] 325 mg PO DAILY 30 Days


Fluticasone Nasal Spray [Flonase Nasal Spray] 2 spr EA NOSTRIL DAILY 30 Days


Lisinopril [Zestril] 20 mg PO HS 30 Days


Lithium Carbonate 300 mg PO BID 30 Days


Loratadine 10 mg PO DAILY PRN 30 Days


 PRN Reason: Allergy Symptoms


Propranolol [Inderal] 20 mg PO BID 30 Days


Ranitidine HCl [Zantac] 150 mg PO BID 30 Days


Rosuvastatin Calcium [Crestor] 5 mg PO HS 30 Days


amLODIPine [Norvasc] 10 mg PO DAILY 30 Days


busPIRone HCL [Buspar] 30 mg PO BID 30 Days


cloZAPine [Clozaril] 100 mg PO HS 30 Days


fluvoxaMINE MALEATE [Luvox CR] 150 mg PO BID 30 Days


lamoTRIgine [LaMICtal] 100 mg PO DAILY 30 Days


lamoTRIgine [LaMICtal] 150 mg PO HS 30 Days


metFORMIN HCL [Glucophage] 500 mg PO BID 30 Days


Discharge Medication List





Vivitrol  380 mg IM Q28D #1  16 [Rx]


Naltrexone Microspheres [Vivitrol] 380 mg IM QMONTH 01/10/17 [History]


Ferrous Sulfate [Iron (65 MG Elemental)] 325 mg PO DAILY 30 Days 17 [Rx]


Fluticasone Nasal Spray [Flonase Nasal Spray] 2 spr EA NOSTRIL DAILY 30 Days  [Rx]


Lisinopril [Zestril] 20 mg PO HS 30 Days 17 [Rx]


Lithium Carbonate 300 mg PO BID 30 Days 17 [Rx]


Loratadine 10 mg PO DAILY PRN 30 Days 17 [Rx]


Propranolol [Inderal] 20 mg PO BID 30 Days 17 [Rx]


Ranitidine HCl [Zantac] 150 mg PO BID 30 Days 17 [Rx]


Rosuvastatin Calcium [Crestor] 5 mg PO HS 30 Days 17 [Rx]


amLODIPine [Norvasc] 10 mg PO DAILY 30 Days 17 [Rx]


busPIRone HCL [Buspar] 30 mg PO BID 30 Days 17 [Rx]


cloZAPine [Clozaril] 100 mg PO HS 30 Days 17 [Rx]


fluvoxaMINE MALEATE [Luvox CR] 150 mg PO BID 30 Days 17 [Rx]


lamoTRIgine [LaMICtal] 100 mg PO DAILY 30 Days 17 [Rx]


lamoTRIgine [LaMICtal] 150 mg PO HS 30 Days 17 [Rx]


metFORMIN HCL [Glucophage] 500 mg PO BID 30 Days 17 [Rx]








Follow up Appointment(s)/Referral(s): 


[facility] Travis[facility] 17 12:30 pm (with Yair Iraheta @ [facility])


Enoc Adame MD [Primary Care Provider] - 1-2 days


Patient Instructions/Handouts:  Depression (DC), Suicide Prevention for Adults (

DC)


Activity/Diet/Wound Care/Special Instructions: 


No alcohol or street drugs. Take medications as prescribed. Notify the crisis 

line or your care provider if symptoms worsen. Crisis line no. [phone]. 

Consistent carbohydrate diet. Activity as tolerated. 


Discharge Disposition: HOME SELF-CARE

## 2017-10-26 ENCOUNTER — HOSPITAL ENCOUNTER (INPATIENT)
Dept: HOSPITAL 47 - EC | Age: 52
LOS: 25 days | Discharge: HOME | DRG: 882 | End: 2017-11-20
Attending: PSYCHIATRY & NEUROLOGY | Admitting: PSYCHIATRY & NEUROLOGY
Payer: MEDICARE

## 2017-10-26 VITALS — BODY MASS INDEX: 31.2 KG/M2

## 2017-10-26 DIAGNOSIS — Z79.899: ICD-10-CM

## 2017-10-26 DIAGNOSIS — R45.851: ICD-10-CM

## 2017-10-26 DIAGNOSIS — I12.9: ICD-10-CM

## 2017-10-26 DIAGNOSIS — E11.22: ICD-10-CM

## 2017-10-26 DIAGNOSIS — K21.9: ICD-10-CM

## 2017-10-26 DIAGNOSIS — F42.8: Primary | ICD-10-CM

## 2017-10-26 DIAGNOSIS — Z91.19: ICD-10-CM

## 2017-10-26 DIAGNOSIS — F25.9: ICD-10-CM

## 2017-10-26 DIAGNOSIS — F31.81: ICD-10-CM

## 2017-10-26 DIAGNOSIS — F10.21: ICD-10-CM

## 2017-10-26 DIAGNOSIS — N18.3: ICD-10-CM

## 2017-10-26 DIAGNOSIS — Z88.0: ICD-10-CM

## 2017-10-26 DIAGNOSIS — Z82.49: ICD-10-CM

## 2017-10-26 DIAGNOSIS — Z88.8: ICD-10-CM

## 2017-10-26 DIAGNOSIS — G47.33: ICD-10-CM

## 2017-10-26 DIAGNOSIS — E78.5: ICD-10-CM

## 2017-10-26 DIAGNOSIS — F41.9: ICD-10-CM

## 2017-10-26 DIAGNOSIS — R21: ICD-10-CM

## 2017-10-26 DIAGNOSIS — E87.0: ICD-10-CM

## 2017-10-26 DIAGNOSIS — Z81.8: ICD-10-CM

## 2017-10-26 DIAGNOSIS — E86.0: ICD-10-CM

## 2017-10-26 DIAGNOSIS — Z91.5: ICD-10-CM

## 2017-10-26 PROCEDURE — 84443 ASSAY THYROID STIM HORMONE: CPT

## 2017-10-26 PROCEDURE — 80159 DRUG ASSAY CLOZAPINE: CPT

## 2017-10-26 PROCEDURE — 80053 COMPREHEN METABOLIC PANEL: CPT

## 2017-10-26 PROCEDURE — 82075 ASSAY OF BREATH ETHANOL: CPT

## 2017-10-26 PROCEDURE — 80061 LIPID PANEL: CPT

## 2017-10-26 PROCEDURE — 99285 EMERGENCY DEPT VISIT HI MDM: CPT

## 2017-10-26 PROCEDURE — 80178 ASSAY OF LITHIUM: CPT

## 2017-10-26 PROCEDURE — 80306 DRUG TEST PRSMV INSTRMNT: CPT

## 2017-10-26 PROCEDURE — 83036 HEMOGLOBIN GLYCOSYLATED A1C: CPT

## 2017-10-26 PROCEDURE — 81003 URINALYSIS AUTO W/O SCOPE: CPT

## 2017-10-26 PROCEDURE — 82565 ASSAY OF CREATININE: CPT

## 2017-10-26 PROCEDURE — 84520 ASSAY OF UREA NITROGEN: CPT

## 2017-10-26 PROCEDURE — 80048 BASIC METABOLIC PNL TOTAL CA: CPT

## 2017-10-26 PROCEDURE — 85025 COMPLETE CBC W/AUTO DIFF WBC: CPT

## 2017-10-27 LAB
GLUCOSE BLD-MCNC: 156 MG/DL (ref 75–99)
GLUCOSE BLD-MCNC: 185 MG/DL (ref 75–99)

## 2017-10-27 RX ADMIN — PROPRANOLOL HYDROCHLORIDE SCH MG: 20 TABLET ORAL at 09:29

## 2017-10-27 RX ADMIN — INSULIN LISPRO SCH UNIT: 100 INJECTION, SOLUTION INTRAVENOUS; SUBCUTANEOUS at 18:00

## 2017-10-27 RX ADMIN — LITHIUM CARBONATE SCH MG: 300 CAPSULE, GELATIN COATED ORAL at 09:29

## 2017-10-27 RX ADMIN — LITHIUM CARBONATE SCH MG: 300 CAPSULE, GELATIN COATED ORAL at 21:09

## 2017-10-27 RX ADMIN — CLOTRIMAZOLE SCH APPLIC: 0.01 CREAM TOPICAL at 21:16

## 2017-10-27 RX ADMIN — PROPRANOLOL HYDROCHLORIDE SCH MG: 20 TABLET ORAL at 21:09

## 2017-10-27 RX ADMIN — INSULIN LISPRO SCH UNIT: 100 INJECTION, SOLUTION INTRAVENOUS; SUBCUTANEOUS at 21:12

## 2017-10-27 RX ADMIN — CLOTRIMAZOLE SCH APPLIC: 0.01 CREAM TOPICAL at 12:33

## 2017-10-27 RX ADMIN — LISINOPRIL SCH MG: 20 TABLET ORAL at 21:09

## 2017-10-27 NOTE — P.CONS
History of Present Illness





- Reason for Consult


Consult date: 10/27/17


This regarding diabetes and other medical issues





- History of Present Illness


This 52-year-old gentleman with a past medical history diabetes mellitus 

hypertension hyperlipidemia being followed by Dr. Adame in the preceding was 

admitted for psychiatric medication.  Patient also complaining of diffuse rash 

especially on the left arm and as well as back.  Last several months.  There is 

no history of fever rigors or chills no headache loss of consciousness seizures 

at this time.








Review of Systems


REVIEW OF SYSTEMS:


ENT:  No diminished vision or hearing.


CARDIOVASCULAR: Mentioned earlier.


RESPIRATORY:  As mentioned earlier.


GI:  No nauscea, vomiting or diarrhea.


: No dysuria or retention.


NERVOUS SYSTEM: No numbness or weakness.


ALLERGY/IMMUNOLOGY:  No asthma or hay fever.


MUSCULOSKELETAL: As mentioned earlier.


HEMATOLOGY/ONCOLOGY:  No history of anemia.


ENDOCRINE: No history of diabetes or hypothyroidism.


CONSTITUTIONAL: As mentioned earlier.


DERMATOLOGY:  Diffuse rash as mentioned


PSYCHIATRY:  Mentioned earlier.


RHEUMATOLOGY:  Negative.











Past Medical History


Past Medical History: Diabetes Mellitus, GERD/Reflux, Hyperlipidemia, 

Hypertension, Sleep Apnea/CPAP/BIPAP


Additional Past Medical History / Comment(s): Family history of premature 

coronary artery disease. OCD.


History of Any Multi-Drug Resistant Organisms: None Reported


Past Surgical History: Hernia Repair


Past Anesthesia/Blood Transfusion Reactions: No Reported Reaction


Smoking Status: Never smoker





- Past Family History


  ** Father


Family Medical History: Congestive Heart Failure (CHF), Diabetes Mellitus





  ** Mother


Family Medical History: Cancer





Medications and Allergies


 Home Medications











 Medication  Instructions  Recorded  Confirmed  Type


 


Lisinopril [Zestril] 20 mg PO HS 30 Days  tab 01/19/17 10/27/17 Rx


 


Propranolol [Inderal] 20 mg PO BID 30 Days  tab 01/19/17 10/27/17 Rx


 


busPIRone HCL [Buspar] 30 mg PO BID 30 Days  tablet 01/19/17 10/27/17 Rx


 


fluvoxaMINE MALEATE [Luvox CR] 150 mg PO BID 30 Days  cap.er.24h 01/19/17 10/27/

17 Rx


 


Lithium Carbonate 300 mg PO DAILY 10/26/17 10/27/17 History


 


Lithium Carbonate 600 mg PO HS 10/26/17 10/27/17 History


 


cloZAPine [Clozaril] 150 mg PO HS 10/26/17 10/27/17 History


 


lamoTRIgine [LaMICtal] 150 mg PO BID 10/26/17 10/27/17 History











 Allergies











Allergy/AdvReac Type Severity Reaction Status Date / Time


 


Penicillins Allergy  Unknown Verified 10/27/17 06:00





   Childhood  


 


risperidone [From Risperdal] Allergy  Unknown Verified 10/27/17 06:00














Physical Exam


Vitals: 


 Vital Signs











  Temp Pulse Pulse Resp BP BP Pulse Ox


 


 10/27/17 09:30    104 H  18   139/94 


 


 10/27/17 06:38  98.4 F      


 


 10/27/17 05:41  96.7 F L   64  17   165/118 


 


 10/26/17 22:25  97.4 F L  78   18  190/114   98








 Intake and Output











 10/26/17 10/27/17 10/27/17





 22:59 06:59 14:59


 


Other:   


 


  Weight 98.883 kg 98.71 kg 











On exam, alert and oriented x3. 


HEENT:  Conjunctivae normal. eyes normal. 


NECK:  No JVD. No thyroid enlargement. No LNs


CARDIOVASCULAR:  S1, S2 muffled. No murmur


RESPIRATION: Breath sounds diminished in the bases. No rhonchi or crackles. No 

bronchial breathing.


ABDOMEN:  Soft,  nontender . No guarding. no masses palpable. No ascites, No 

hepatosplenomegaly.Bowel sounds heard.


LEGS:  No edema. no swelling 


NERVOUS SYSTEM:  Cranial N 2-12 grossly normal. Moves all 4 limbs. No focal 

deficits. No sensory deficit. No signs of cerebellar dysfucntion.


Skin: Diffuse maculopapular rash with the activity in the periphery over the 

left arm lesions and as well as in the back suggestive of tinea


Joints: No active swelling. No inflammation.


Lymphatic system. No LN neck axilla or groin.











Assessment and Plan


Assessment: 


Final diagnosis


1.  Diffuse macular papular rash possibly tenia


 2.  Diabetes was type II


3.  Hypertension


4.  Hyperlipidemia


5.  GERD


6.  Sleep apnea


7.  Bipolar depression








Plan


In this 52-year-old gentleman admitted with multiple medical issues at this 

time I would recommend local treatment for the tenia.  The patient is not 

improving and dermatology consultation may be obtained.  I would recommend to 

monitor blood sugars closely before meals and at bedtime and as well as 

continue the home medications as well we'll follow the patient closely with 

given the patient is admitted otherwise patient may be asked to follow-up with 

the primary care physician in the outpatient setting.  Thank you for letting us 

participate in the care of this patient.

## 2017-10-27 NOTE — ED
Psych HPI





- General


Chief Complaint: Psychiatric Symptoms


Stated Complaint: Mental Health


Time Seen by Provider: 10/26/17 23:03


Source: family, police


Mode of arrival: ambulatory





- History of Present Illness


Initial Comments: 





This patient is a 52-year-old man who presents with complaint that for about 

one month his mood is been worsening.  He states that now he is considering 

suicide and has thought about taking an overdose.  The patient has long history 

of psychiatric illness.  He states that he does have a psychiatrist and a 

counselor through [facility].  He states that over about the past month he has also 

not been taking his medication as prescribed.


MD Complaint: suicidal ideation, feels depressed


Onset/Timin


-: month(s)


Associated Psychiatric Symptoms: depression, suicidal ideation


History of same: Yes


Quality: getting worse


Improves With: none


Worsens With: none


Context: not taking psychiatric medications


Associated Symptoms: denies other symptoms





- Related Data


 Home Medications











 Medication  Instructions  Recorded  Confirmed


 


Lithium Carbonate 300 mg PO DAILY 10/26/17 10/26/17


 


Lithium Carbonate 600 mg PO HS 10/26/17 10/26/17


 


cloZAPine [Clozaril] 150 mg PO HS 10/26/17 10/26/17


 


lamoTRIgine [LaMICtal] 150 mg PO BID 10/26/17 10/26/17








 Previous Rx's











 Medication  Instructions  Recorded


 


Lisinopril [Zestril] 20 mg PO HS 30 Days  tab 17


 


Propranolol [Inderal] 20 mg PO BID 30 Days  tab 17


 


busPIRone HCL [Buspar] 30 mg PO BID 30 Days  tablet 17


 


fluvoxaMINE MALEATE [Luvox CR] 150 mg PO BID 30 Days  cap.er.24h 17











 Allergies











Allergy/AdvReac Type Severity Reaction Status Date / Time


 


Penicillins Allergy  Unknown Verified 10/26/17 23:03





   Childhood  


 


risperidone [From Risperdal] Allergy  Unknown Verified 10/26/17 23:03














Review of Systems


ROS Statement: 


Those systems with pertinent positive or pertinent negative responses have been 

documented in the HPI.





ROS Other: All systems not noted in ROS Statement are negative.


Constitutional: Denies: fever, chills


Respiratory: Denies: cough, dyspnea


Cardiovascular: Denies: chest pain, edema


Gastrointestinal: Denies: abdominal pain, vomiting, diarrhea


Genitourinary: Denies: dysuria, hematuria


Musculoskeletal: Denies: back pain


Neurological: Denies: headache


Psychiatric: Reports: depression, suicidal thoughts.  Denies: auditory 

hallucinations, visual hallucinations, homicidal thoughts





Past Medical History


Past Medical History: Diabetes Mellitus, GERD/Reflux, Hyperlipidemia, 

Hypertension, Sleep Apnea/CPAP/BIPAP


Additional Past Medical History / Comment(s): Family history of premature 

coronary artery disease. OCD.


History of Any Multi-Drug Resistant Organisms: None Reported


Past Surgical History: Hernia Repair


Past Anesthesia/Blood Transfusion Reactions: No Reported Reaction


Past Psychological History: Anxiety, Bipolar, Depression, Schizoaffective 

Disorder


Smoking Status: Never smoker


Past Alcohol Use History: None Reported


Past Drug Use History: None Reported





- Past Family History


  ** Father


Family Medical History: Congestive Heart Failure (CHF), Diabetes Mellitus





  ** Mother


Family Medical History: Cancer





General Exam


Limitations: no limitations


General appearance: alert, in no apparent distress


Head exam: Present: atraumatic, normocephalic


Eye exam: Present: normal appearance.  Absent: scleral icterus, conjunctival 

injection


ENT exam: Present: normal oropharynx


Neck exam: Present: normal inspection, full ROM


Respiratory exam: Present: normal lung sounds bilaterally.  Absent: respiratory 

distress, wheezes, rales, rhonchi, stridor


Cardiovascular Exam: Present: regular rate, normal rhythm, normal heart sounds.

  Absent: systolic murmur, diastolic murmur, rubs, gallop


GI/Abdominal exam: Present: soft.  Absent: distended, tenderness, guarding, 

rebound


Extremities exam: Present: normal inspection, normal capillary refill.  Absent: 

pedal edema, calf tenderness


Back exam: Present: normal inspection.  Absent: CVA tenderness (R), CVA 

tenderness (L)


Neurological exam: Present: alert


Psychiatric exam: Present: depressed, suicidal ideation.  Absent: anxious, flat 

affect, manic, homicidal ideation


Skin exam: Present: warm, dry, intact, normal color.  Absent: rash





Course


 Vital Signs











  10/26/17





  22:25


 


Temperature 97.4 F L


 


Pulse Rate 78


 


Respiratory 18





Rate 


 


Blood Pressure 190/114


 


O2 Sat by Pulse 98





Oximetry 














Medical Decision Making





- Lab Data


 Lab Results











  10/26/17 Range/Units





  23:00 


 


Urine Opiates Screen  Not Detected  (NotDetected)  


 


Ur Oxycodone Screen  Not Detected  (NotDetected)  


 


Urine Methadone Screen  Not Detected  (NotDetected)  


 


Ur Propoxyphene Screen  Not Detected  (NotDetected)  


 


Ur Barbiturates Screen  Not Detected  (NotDetected)  


 


U Tricyclic Antidepress  Not Detected  (NotDetected)  


 


Ur Phencyclidine Scrn  Not Detected  (NotDetected)  


 


Ur Amphetamines Screen  Not Detected  (NotDetected)  


 


U Methamphetamines Scrn  Not Detected  (NotDetected)  


 


U Benzodiazepines Scrn  Not Detected  (NotDetected)  


 


Urine Cocaine Screen  Not Detected  (NotDetected)  


 


U Marijuana (THC) Screen  Not Detected  (NotDetected)  














Disposition


Clinical Impression: 


 Suicidal ideation, Mood disorder





Disposition: TRANSFER TO PSYCH HOSP/UNIT


Condition: Fair


Referrals: 


Enoc Adame MD [Primary Care Provider] - 1-2 days
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
## 2017-10-28 LAB
ALP SERPL-CCNC: 68 U/L (ref 38–126)
ALT SERPL-CCNC: 45 U/L (ref 21–72)
ANION GAP SERPL CALC-SCNC: 8 MMOL/L
AST SERPL-CCNC: 22 U/L (ref 17–59)
BASOPHILS # BLD AUTO: 0 K/UL (ref 0–0.2)
BASOPHILS NFR BLD AUTO: 0 %
BUN SERPL-SCNC: 16 MG/DL (ref 9–20)
CALCIUM SPEC-MCNC: 9.5 MG/DL (ref 8.4–10.2)
CH: 30.6
CHCM: 35
CHLORIDE SERPL-SCNC: 108 MMOL/L (ref 98–107)
CHOLEST SERPL-MCNC: 121 MG/DL (ref ?–200)
CO2 SERPL-SCNC: 27 MMOL/L (ref 22–30)
EOSINOPHIL # BLD AUTO: 0.2 K/UL (ref 0–0.7)
EOSINOPHIL NFR BLD AUTO: 2 %
ERYTHROCYTE [DISTWIDTH] IN BLOOD BY AUTOMATED COUNT: 5.11 M/UL (ref 4.3–5.9)
ERYTHROCYTE [DISTWIDTH] IN BLOOD: 13 % (ref 11.5–15.5)
GLUCOSE BLD-MCNC: 133 MG/DL (ref 75–99)
GLUCOSE BLD-MCNC: 187 MG/DL (ref 75–99)
GLUCOSE BLD-MCNC: 244 MG/DL (ref 75–99)
GLUCOSE BLD-MCNC: 96 MG/DL (ref 75–99)
GLUCOSE SERPL-MCNC: 132 MG/DL (ref 74–99)
HCT VFR BLD AUTO: 44.8 % (ref 39–53)
HDLC SERPL-MCNC: 36 MG/DL (ref 40–60)
HDW: 2.96
HGB BLD-MCNC: 15 GM/DL (ref 13–17.5)
LITHIUM SERPL-MCNC: 0.4 MMOL/L
LUC NFR BLD AUTO: 1 %
LYMPHOCYTES # SPEC AUTO: 1.9 K/UL (ref 1–4.8)
LYMPHOCYTES NFR SPEC AUTO: 21 %
MCH RBC QN AUTO: 29.4 PG (ref 25–35)
MCHC RBC AUTO-ENTMCNC: 33.5 G/DL (ref 31–37)
MCV RBC AUTO: 87.7 FL (ref 80–100)
MONOCYTES # BLD AUTO: 0.4 K/UL (ref 0–1)
MONOCYTES NFR BLD AUTO: 4 %
NEUTROPHILS # BLD AUTO: 6.5 K/UL (ref 1.3–7.7)
NEUTROPHILS NFR BLD AUTO: 72 %
NON-AFRICAN AMERICAN GFR(MDRD): >60
POTASSIUM SERPL-SCNC: 3.7 MMOL/L (ref 3.5–5.1)
PROT SERPL-MCNC: 6.3 G/DL (ref 6.3–8.2)
SODIUM SERPL-SCNC: 143 MMOL/L (ref 137–145)
WBC # BLD AUTO: 0.08 10*3/UL
WBC # BLD AUTO: 9.2 K/UL (ref 3.8–10.6)
WBC (PEROX): 9.17

## 2017-10-28 RX ADMIN — PROPRANOLOL HYDROCHLORIDE SCH MG: 20 TABLET ORAL at 08:02

## 2017-10-28 RX ADMIN — LISINOPRIL SCH MG: 20 TABLET ORAL at 22:11

## 2017-10-28 RX ADMIN — CLOTRIMAZOLE SCH APPLIC: 0.01 CREAM TOPICAL at 08:02

## 2017-10-28 RX ADMIN — LITHIUM CARBONATE SCH MG: 300 CAPSULE, GELATIN COATED ORAL at 22:12

## 2017-10-28 RX ADMIN — INSULIN LISPRO SCH: 100 INJECTION, SOLUTION INTRAVENOUS; SUBCUTANEOUS at 17:48

## 2017-10-28 RX ADMIN — LITHIUM CARBONATE SCH MG: 300 CAPSULE, GELATIN COATED ORAL at 08:02

## 2017-10-28 RX ADMIN — INSULIN LISPRO SCH UNIT: 100 INJECTION, SOLUTION INTRAVENOUS; SUBCUTANEOUS at 12:44

## 2017-10-28 RX ADMIN — PROPRANOLOL HYDROCHLORIDE SCH MG: 20 TABLET ORAL at 22:12

## 2017-10-28 RX ADMIN — INSULIN LISPRO SCH UNIT: 100 INJECTION, SOLUTION INTRAVENOUS; SUBCUTANEOUS at 07:50

## 2017-10-28 RX ADMIN — INSULIN LISPRO SCH UNIT: 100 INJECTION, SOLUTION INTRAVENOUS; SUBCUTANEOUS at 20:17

## 2017-10-29 LAB
GLUCOSE BLD-MCNC: 119 MG/DL (ref 75–99)
GLUCOSE BLD-MCNC: 153 MG/DL (ref 75–99)
GLUCOSE BLD-MCNC: 177 MG/DL (ref 75–99)
GLUCOSE BLD-MCNC: 87 MG/DL (ref 75–99)
PH UR: 7 [PH] (ref 5–8)
SP GR UR: 1.01 (ref 1–1.03)
UA BILLING (MACRO VS. MICRO): (no result)
UROBILINOGEN UR QL STRIP: <2 MG/DL (ref ?–2)

## 2017-10-29 RX ADMIN — CLOTRIMAZOLE SCH APPLIC: 0.01 CREAM TOPICAL at 21:56

## 2017-10-29 RX ADMIN — PROPRANOLOL HYDROCHLORIDE SCH MG: 20 TABLET ORAL at 21:54

## 2017-10-29 RX ADMIN — LITHIUM CARBONATE SCH MG: 300 CAPSULE, GELATIN COATED ORAL at 21:54

## 2017-10-29 RX ADMIN — INSULIN LISPRO SCH: 100 INJECTION, SOLUTION INTRAVENOUS; SUBCUTANEOUS at 17:54

## 2017-10-29 RX ADMIN — ACETAMINOPHEN PRN MG: 325 TABLET, FILM COATED ORAL at 03:07

## 2017-10-29 RX ADMIN — LITHIUM CARBONATE SCH MG: 300 CAPSULE, GELATIN COATED ORAL at 09:00

## 2017-10-29 RX ADMIN — CLOTRIMAZOLE SCH: 0.01 CREAM TOPICAL at 00:37

## 2017-10-29 RX ADMIN — INSULIN LISPRO SCH UNIT: 100 INJECTION, SOLUTION INTRAVENOUS; SUBCUTANEOUS at 12:39

## 2017-10-29 RX ADMIN — LISINOPRIL SCH MG: 20 TABLET ORAL at 21:53

## 2017-10-29 RX ADMIN — INSULIN LISPRO SCH UNIT: 100 INJECTION, SOLUTION INTRAVENOUS; SUBCUTANEOUS at 20:19

## 2017-10-29 RX ADMIN — INSULIN LISPRO SCH: 100 INJECTION, SOLUTION INTRAVENOUS; SUBCUTANEOUS at 08:18

## 2017-10-29 RX ADMIN — PROPRANOLOL HYDROCHLORIDE SCH MG: 20 TABLET ORAL at 09:01

## 2017-10-29 RX ADMIN — CLOTRIMAZOLE SCH APPLIC: 0.01 CREAM TOPICAL at 09:01

## 2017-10-29 NOTE — P.PN
Progress Note - Text


Progress Note Date: 10/29/17


Interval History: Pt states that he continues to be "depressed".  Reports that 

his SI continues "off and on".  Slept fair last night but some continued sleep 

disturbance due to reported sleep apnea.  Has been attending some groups on the 

unit.  States that his sister came to visit him last night and felt that this 

went well.  Compliant with medications and reports no adverse effects.  





MSE:


Appearance:  alert, well groomed, appears stated age, steady gait; rash on L 

forearm as stated above


Behavior: no psychomotor agitation or psychomotor retardation,  no abnormal 

movements, fair eye contact


Attitude: cooperative 


Speech:  normal rate, rhythm,  fluency, articulation, volume, and prosody;  

primary language: English


Mood: "depressed"


Affect: anxious


Thought processes:  linear


Thought content: patient does not appear to be responding to internal stimuli; 

patient denies auditory and visual hallucinations, no delusions appreciated, 

denies HI.  Does have 


(+)obsessions,  compulsions and SI


Insight: fair


Judgment: fair  


Cognitive:  oriented to all 3 spheres, average intelligence











Plan:


Continue current medication regimen for now.  Continue to monitor for safety.  

Encourage particpation in groups.

## 2017-10-29 NOTE — P.PN
Progress Note - Text


Progress Note Date: 10/28/17


Interval History: Pt states that he continues to be "really depressed" and has 

suicidal ideations.  States that he is upset that it took him 20 min to put on 

his sweatpants this morning due to his obsessive thoughts.  Pt reports that he 

has a voice inside his head that becomes more prominent when he is depressed 

and tells him to kill himself along with other negative commentary.  Unsure of 

who this male voice is but sometimes believes it is his stepfather.  Did sleep 

well last night.  Has been compliant with medications on the unit and no 

reported adverse effects.  Pt does not know the names of all past medications 

and even current meds.  States that he has been on Luvox for maybe a year now.  

Unsure if he's had a trial of Clomipramine in the past to treat his OCD.  He 

did not attend groups initially upon admission as he states that he was 

confined to his room pending further work-up for a rash on his arm.  He showed 

this provider the rash which appeared to be diffuse maculopapular rash on his L 

forearm.  Pt stated that it has been present for months and will come and go.  





MSE:


Appearance:  alert, well groomed, appears stated age, steady gait; rash on L 

forearm as stated above


Behavior: no psychomotor agitation or psychomotor retardation,  no abnormal 

movements, fair eye contact


Attitude: cooperative 


Speech:  normal rate, rhythm,  fluency, articulation, volume, and prosody;  

primary language: English


Mood: "depressed"


Affect: anxious


Thought processes:  linear


Thought content: patient does not appear to be responding to internal stimuli; 

patient denies auditory and visual hallucinations, no delusions appreciated, 

denies HI.  Does have 


(+)obsessions,  compulsions and SI


Insight: fair


Judgment: fair  


Cognitive:  oriented to all 3 spheres, average intelligence











Plan:


Continue current medication regimen for now.  Would consider the possibility of 

Clomipramine for Severe OCD and/or using an alternative antipsychotic to 

Clozaril for treatment of OCD.  Reviewed labs and no significant abnormalities 

thus far.  Continue to monitor for safety.  Encourage particpation in groups.  

Patient would benefit from CBT and exposure therapy after discharge for further 

treatment of his OCD.

## 2017-10-30 LAB
GLUCOSE BLD-MCNC: 115 MG/DL (ref 75–99)
GLUCOSE BLD-MCNC: 140 MG/DL (ref 75–99)
GLUCOSE BLD-MCNC: 173 MG/DL (ref 75–99)
GLUCOSE BLD-MCNC: 95 MG/DL (ref 75–99)

## 2017-10-30 RX ADMIN — LITHIUM CARBONATE SCH MG: 300 CAPSULE, GELATIN COATED ORAL at 21:39

## 2017-10-30 RX ADMIN — CLOTRIMAZOLE SCH APPLIC: 0.01 CREAM TOPICAL at 22:06

## 2017-10-30 RX ADMIN — INSULIN LISPRO SCH: 100 INJECTION, SOLUTION INTRAVENOUS; SUBCUTANEOUS at 18:34

## 2017-10-30 RX ADMIN — INSULIN LISPRO SCH UNIT: 100 INJECTION, SOLUTION INTRAVENOUS; SUBCUTANEOUS at 13:33

## 2017-10-30 RX ADMIN — PROPRANOLOL HYDROCHLORIDE SCH MG: 20 TABLET ORAL at 21:40

## 2017-10-30 RX ADMIN — INSULIN LISPRO SCH: 100 INJECTION, SOLUTION INTRAVENOUS; SUBCUTANEOUS at 08:14

## 2017-10-30 RX ADMIN — PROPRANOLOL HYDROCHLORIDE SCH MG: 20 TABLET ORAL at 10:20

## 2017-10-30 RX ADMIN — CLOTRIMAZOLE SCH APPLIC: 0.01 CREAM TOPICAL at 10:19

## 2017-10-30 RX ADMIN — LITHIUM CARBONATE SCH MG: 300 CAPSULE, GELATIN COATED ORAL at 10:20

## 2017-10-30 RX ADMIN — LISINOPRIL SCH MG: 20 TABLET ORAL at 21:39

## 2017-10-30 RX ADMIN — INSULIN LISPRO SCH UNIT: 100 INJECTION, SOLUTION INTRAVENOUS; SUBCUTANEOUS at 21:48
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
## 2017-10-31 LAB
GLUCOSE BLD-MCNC: 120 MG/DL (ref 75–99)
GLUCOSE BLD-MCNC: 144 MG/DL (ref 75–99)
GLUCOSE BLD-MCNC: 188 MG/DL (ref 75–99)
GLUCOSE BLD-MCNC: 193 MG/DL (ref 75–99)

## 2017-10-31 RX ADMIN — LITHIUM CARBONATE SCH MG: 300 CAPSULE, GELATIN COATED ORAL at 08:59

## 2017-10-31 RX ADMIN — LISINOPRIL SCH MG: 20 TABLET ORAL at 21:11

## 2017-10-31 RX ADMIN — INSULIN LISPRO SCH UNIT: 100 INJECTION, SOLUTION INTRAVENOUS; SUBCUTANEOUS at 20:22

## 2017-10-31 RX ADMIN — CLOTRIMAZOLE SCH APPLIC: 0.01 CREAM TOPICAL at 21:10

## 2017-10-31 RX ADMIN — INSULIN LISPRO SCH: 100 INJECTION, SOLUTION INTRAVENOUS; SUBCUTANEOUS at 07:31

## 2017-10-31 RX ADMIN — CLOTRIMAZOLE SCH APPLIC: 0.01 CREAM TOPICAL at 08:59

## 2017-10-31 RX ADMIN — INSULIN LISPRO SCH UNIT: 100 INJECTION, SOLUTION INTRAVENOUS; SUBCUTANEOUS at 17:52

## 2017-10-31 RX ADMIN — PROPRANOLOL HYDROCHLORIDE SCH MG: 20 TABLET ORAL at 21:12

## 2017-10-31 RX ADMIN — LITHIUM CARBONATE SCH MG: 300 CAPSULE, GELATIN COATED ORAL at 21:12

## 2017-10-31 RX ADMIN — INSULIN LISPRO SCH UNIT: 100 INJECTION, SOLUTION INTRAVENOUS; SUBCUTANEOUS at 12:57

## 2017-10-31 RX ADMIN — PROPRANOLOL HYDROCHLORIDE SCH MG: 20 TABLET ORAL at 09:00

## 2017-10-31 NOTE — P.PN
Subjective


Principal diagnosis: 





OCD








Interval History:





More social this morning, less withdrawn, although patient reports feeling 

quite uncomfortalen. Reports mild relief with Ativan. Obsessional thoughts 

still present but patient hopeful Ativan can help.   





Mental Status Exam:





   Appearance:  alert, well groomed, appears stated age, steady gait 


   Behavior: no psychomotor agitation or psychomotor retardation,  no abnormal 

movements, fair eye contact


   Attitude: cooperative 


   Speech:  normal rate, rhythm,  fluency, articulation, volume, and prosody;  

primary language: English


   Mood: sad


   Affect: dull, dim, restricted


   Thought processes:  linear


   Thought content: patient does not appear to be responding to internal 

stimuli; patient denies auditory and visual hallucinations, no delusions 

appreciated, endorses 


      q            qobsessions and compulsions daily that are    


   overwhelming with obsessional content to harm self and feelings of hopeless 

and fatigue and just "giving up"


   Insight: fair


   Judgment: fair 


   Cognitive:  oriented to all 3 spheres, average intelligence





Plan:





   Continue hospitalization, patient continues to struggle with SI





   Increase and change Ativan to 1-mg PO AM + 0.5-mg PO TID scheduled instead 

of PRN





   VS, Labs reviewed wnls





   Otherwise continue current regimen





   SW has been asked to contact [facility] to discharge options for OCD such as 

Exposure Therapy   





Objective





- Vital Signs


Vital signs: 


 Vital Signs











Temp  98.2 F   10/31/17 06:38


 


Pulse  63   10/31/17 06:38


 


Resp  18   10/31/17 06:38


 


BP  140/81   10/31/17 06:38


 


Pulse Ox  98   10/26/17 22:25














- Labs


CBC & Chem 7: 


 10/28/17 08:15





 10/28/17 08:15


Labs: 


 Abnormal Lab Results - Last 24 Hours (Table)











  10/31/17 10/31/17 10/31/17 Range/Units





  06:16 12:32 17:43 


 


POC Glucose (mg/dL)  120 H  188 H  193 H  (75-99)  mg/dL














  10/31/17 Range/Units





  19:56 


 


POC Glucose (mg/dL)  144 H  (75-99)  mg/dL














Assessment and Plan


(1) Obsessive compulsive disorder


Current Visit: Yes   Status: Chronic   Priority: High   Code(s): F42.9 - 

OBSESSIVE-COMPULSIVE DISORDER, UNSPECIFIED   SNOMED Code(s): 214196840


   





(2) Alcohol use disorder, severe, in sustained remission


Current Visit: Yes   Status: Chronic   Priority: Medium   Code(s): F10.21 - 

ALCOHOL DEPENDENCE, IN REMISSION   SNOMED Code(s): 19525890


   


Plan: 








PLAN:





Continue hospitalization, patient has severe OCD with obsessions that contain 

intrusive unwanted thoughts of self harm and suicide and patient at times feels 

compelled to comply. While patient does deny acting upon such thoughts recently

, he does report fear that he will and he has attempted to kill himself due to 

such several times in the past.








CURRENT REGIMEN: 





Continue current regimen, will consider changes pending labs





 3





Generic Name Dose Route Start Last Admin





  Trade Name Freq  PRN Reason Stop Dose Admin


 


Buspirone HCl  30 mg  10/27/17 09:00  10/27/17 09:28





  Buspar  PO   30 mg





  BID VENKATA   Administration


 


Clozapine  150 mg  10/27/17 21:00  





  Clozaril  PO  11/01/17 23:59  





  HS VENKATA   


 


Fluvoxamine Maleate  150 mg  10/27/17 09:00  10/27/17 09:29





  Luvox  PO   150 mg





  BID VENKATA   Administration


 


Lamotrigine  150 mg  10/27/17 09:00  10/27/17 09:29





  Lamictal  PO   150 mg





  BID VENKATA   Administration


 


Lithium Carbonate  600 mg  10/27/17 21:00  





  Lithium Carbonate  PO   





  HS VENKATA   


 


Lorazepam  1 mg  10/27/17 03:00  10/27/17 03:22





  Ativan  PO   1 mg





  BID PRN   Administration





  Agitation or Acute Anxiety   


 


Propranolol HCl  20 mg  10/27/17 09:00  10/27/17 09:29





  Inderal  PO   20 mg





  BID VENKATA   Administration








LABS:





Multiple labs ordered:





Clozapine (Clozaril)


Comlete Blood Count w/diff 


Comprehensive Metabolic Panel 


Lipid Panel


Lithium


TSH, 3rd Generation 


Urinalysis 


 


VITALS:





HTN noted, will monitor closely





 3





  10/26/17 10/27/17 10/27/17





  22:25 05:41 06:38


 


Temperature 97.4 F L 96.7 F L 98.4 F


 


Pulse Rate 78  


 


Pulse Rate [  64 





Right Sitting]   


 


Respiratory 18 17 





Rate   


 


Blood Pressure 190/114  


 


Blood Pressure  165/118 





[Right Arm   





Sitting]   


 


O2 Sat by Pulse 98  





Oximetry   








 3





  10/27/17





  09:30


 


Temperature 


 


Pulse Rate 


 


Pulse Rate [ 104 H





Right Sitting] 


 


Respiratory 18





Rate 


 


Blood Pressure 


 


Blood Pressure 139/94





[Right Arm 





Sitting] 


 


O2 Sat by Pulse 





Oximetry

## 2017-11-01 LAB
BASOPHILS # BLD AUTO: 0 K/UL (ref 0–0.2)
BASOPHILS NFR BLD AUTO: 0 %
CH: 29.6
CHCM: 33.9
EOSINOPHIL # BLD AUTO: 0.2 K/UL (ref 0–0.7)
EOSINOPHIL NFR BLD AUTO: 2 %
ERYTHROCYTE [DISTWIDTH] IN BLOOD BY AUTOMATED COUNT: 5.23 M/UL (ref 4.3–5.9)
ERYTHROCYTE [DISTWIDTH] IN BLOOD: 14.3 % (ref 11.5–15.5)
GLUCOSE BLD-MCNC: 114 MG/DL (ref 75–99)
GLUCOSE BLD-MCNC: 115 MG/DL (ref 75–99)
GLUCOSE BLD-MCNC: 133 MG/DL (ref 75–99)
GLUCOSE BLD-MCNC: 151 MG/DL (ref 75–99)
HCT VFR BLD AUTO: 45.9 % (ref 39–53)
HDW: 2.77
HGB BLD-MCNC: 15 GM/DL (ref 13–17.5)
LUC NFR BLD AUTO: 1 %
LYMPHOCYTES # SPEC AUTO: 2 K/UL (ref 1–4.8)
LYMPHOCYTES NFR SPEC AUTO: 20 %
MCH RBC QN AUTO: 28.7 PG (ref 25–35)
MCHC RBC AUTO-ENTMCNC: 32.7 G/DL (ref 31–37)
MCV RBC AUTO: 87.8 FL (ref 80–100)
MONOCYTES # BLD AUTO: 0.6 K/UL (ref 0–1)
MONOCYTES NFR BLD AUTO: 6 %
NEUTROPHILS # BLD AUTO: 6.9 K/UL (ref 1.3–7.7)
NEUTROPHILS NFR BLD AUTO: 71 %
WBC # BLD AUTO: 0.08 10*3/UL
WBC # BLD AUTO: 9.8 K/UL (ref 3.8–10.6)
WBC (PEROX): 9.74

## 2017-11-01 RX ADMIN — LISINOPRIL SCH MG: 20 TABLET ORAL at 22:02

## 2017-11-01 RX ADMIN — LITHIUM CARBONATE SCH MG: 300 CAPSULE, GELATIN COATED ORAL at 22:01

## 2017-11-01 RX ADMIN — LORATADINE, PSEUDOEPHEDRINE SULFATE SCH EACH: 5; 120 TABLET, FILM COATED, EXTENDED RELEASE ORAL at 22:01

## 2017-11-01 RX ADMIN — PROPRANOLOL HYDROCHLORIDE SCH MG: 20 TABLET ORAL at 08:55

## 2017-11-01 RX ADMIN — PROPRANOLOL HYDROCHLORIDE SCH MG: 20 TABLET ORAL at 22:01

## 2017-11-01 RX ADMIN — CLOTRIMAZOLE SCH APPLIC: 0.01 CREAM TOPICAL at 23:31

## 2017-11-01 RX ADMIN — LITHIUM CARBONATE SCH MG: 300 CAPSULE, GELATIN COATED ORAL at 08:54

## 2017-11-01 RX ADMIN — INSULIN LISPRO SCH: 100 INJECTION, SOLUTION INTRAVENOUS; SUBCUTANEOUS at 07:35

## 2017-11-01 RX ADMIN — INSULIN LISPRO SCH UNIT: 100 INJECTION, SOLUTION INTRAVENOUS; SUBCUTANEOUS at 17:28

## 2017-11-01 RX ADMIN — INSULIN LISPRO SCH UNIT: 100 INJECTION, SOLUTION INTRAVENOUS; SUBCUTANEOUS at 20:22

## 2017-11-01 RX ADMIN — CLOTRIMAZOLE SCH APPLIC: 0.01 CREAM TOPICAL at 08:58

## 2017-11-01 RX ADMIN — INSULIN LISPRO SCH: 100 INJECTION, SOLUTION INTRAVENOUS; SUBCUTANEOUS at 13:06

## 2017-11-01 NOTE — P.PN
Subjective


Principal diagnosis: 





OCD








Interval History:





Scheduled Ativan somewhat effective per patient in making him feel more relaxed 

and lessening the burden of obsessional content, however, the current dose was 

overly sedating causing patient to dose off snoring during both a group and 

activity therapy today, but he still managed to attend both! 





Mental Status Exam:





   Appearance:  alert, well groomed, appears stated age, steady gait 


   Behavior: no psychomotor agitation or psychomotor retardation,  no abnormal 

movements, fair eye contact


   Attitude: cooperative 


   Speech:  normal rate, rhythm,  fluency, articulation, volume, and prosody;  

primary language: English


   Mood: anxious (improved?)


   Affect: dull, dim, restricted


   Thought processes:  linear


   Thought content: patient does not appear to be responding to internal 

stimuli; patient denies auditory and visual hallucinations, no delusions 

appreciated, endorses 


      obsessions and compulsions daily that are    


   overwhelming with obsessional content to harm self and feelings of hopeless 

and fatigue and just "giving up"


   Insight: fair


   Judgment: fair 


   Cognitive:  oriented to all 3 spheres, average intelligence





Plan:





   Continue hospitalization, patient continues to struggle with SI





   Decrease and change Ativan to 1-mg PO AM + 0.-mg PO TID scheduled instead 

of PRN





   VS, Labs reviewed wnls





   Otherwise continue current regimen





   SW has been asked to contact [facility] to discharge options for OCD such as 

Exposure Therapy   





Objective





- Vital Signs


Vital signs: 


 Vital Signs











Temp  98.1 F   11/01/17 06:35


 


Pulse  73   11/01/17 08:12


 


Resp  18   11/01/17 08:12


 


BP  128/83   11/01/17 08:12


 


Pulse Ox  98   10/26/17 22:25














- Labs


CBC & Chem 7: 


 11/01/17 08:51





 10/28/17 08:15


Labs: 


 Abnormal Lab Results - Last 24 Hours (Table)











  11/01/17 11/01/17 11/01/17 Range/Units





  07:05 12:59 17:10 


 


POC Glucose (mg/dL)  115 H  114 H  133 H  (75-99)  mg/dL














  11/01/17 Range/Units





  20:21 


 


POC Glucose (mg/dL)  151 H  (75-99)  mg/dL














Assessment and Plan


(1) Obsessive compulsive disorder


Current Visit: Yes   Status: Chronic   Priority: High   Code(s): F42.9 - 

OBSESSIVE-COMPULSIVE DISORDER, UNSPECIFIED   SNOMED Code(s): 166021012


   





(2) Alcohol use disorder, severe, in sustained remission


Current Visit: Yes   Status: Chronic   Priority: Medium   Code(s): F10.21 - 

ALCOHOL DEPENDENCE, IN REMISSION   SNOMED Code(s): 47249110

## 2017-11-02 LAB
GLUCOSE BLD-MCNC: 127 MG/DL (ref 75–99)
GLUCOSE BLD-MCNC: 129 MG/DL (ref 75–99)
GLUCOSE BLD-MCNC: 142 MG/DL (ref 75–99)

## 2017-11-02 RX ADMIN — LITHIUM CARBONATE SCH MG: 300 CAPSULE, GELATIN COATED ORAL at 08:32

## 2017-11-02 RX ADMIN — CLOTRIMAZOLE SCH APPLIC: 0.01 CREAM TOPICAL at 08:31

## 2017-11-02 RX ADMIN — LORATADINE, PSEUDOEPHEDRINE SULFATE SCH EACH: 5; 120 TABLET, FILM COATED, EXTENDED RELEASE ORAL at 08:31

## 2017-11-02 RX ADMIN — PROPRANOLOL HYDROCHLORIDE SCH MG: 20 TABLET ORAL at 21:47

## 2017-11-02 RX ADMIN — INSULIN LISPRO SCH: 100 INJECTION, SOLUTION INTRAVENOUS; SUBCUTANEOUS at 07:31

## 2017-11-02 RX ADMIN — INSULIN LISPRO SCH: 100 INJECTION, SOLUTION INTRAVENOUS; SUBCUTANEOUS at 21:26

## 2017-11-02 RX ADMIN — PROPRANOLOL HYDROCHLORIDE SCH MG: 20 TABLET ORAL at 08:31

## 2017-11-02 RX ADMIN — CLOTRIMAZOLE SCH APPLIC: 0.01 CREAM TOPICAL at 21:46

## 2017-11-02 RX ADMIN — INSULIN LISPRO SCH: 100 INJECTION, SOLUTION INTRAVENOUS; SUBCUTANEOUS at 17:42

## 2017-11-02 RX ADMIN — LISINOPRIL SCH MG: 20 TABLET ORAL at 21:44

## 2017-11-02 RX ADMIN — INSULIN LISPRO SCH UNIT: 100 INJECTION, SOLUTION INTRAVENOUS; SUBCUTANEOUS at 12:48

## 2017-11-02 RX ADMIN — LORATADINE, PSEUDOEPHEDRINE SULFATE SCH EACH: 5; 120 TABLET, FILM COATED, EXTENDED RELEASE ORAL at 21:44

## 2017-11-02 RX ADMIN — LITHIUM CARBONATE SCH MG: 300 CAPSULE, GELATIN COATED ORAL at 21:45

## 2017-11-03 LAB
GLUCOSE BLD-MCNC: 116 MG/DL (ref 75–99)
GLUCOSE BLD-MCNC: 123 MG/DL (ref 75–99)
GLUCOSE BLD-MCNC: 154 MG/DL (ref 75–99)
GLUCOSE BLD-MCNC: 215 MG/DL (ref 75–99)

## 2017-11-03 RX ADMIN — LORATADINE, PSEUDOEPHEDRINE SULFATE SCH EACH: 5; 120 TABLET, FILM COATED, EXTENDED RELEASE ORAL at 20:45

## 2017-11-03 RX ADMIN — CLOTRIMAZOLE SCH: 0.01 CREAM TOPICAL at 23:30

## 2017-11-03 RX ADMIN — INSULIN LISPRO SCH UNIT: 100 INJECTION, SOLUTION INTRAVENOUS; SUBCUTANEOUS at 20:46

## 2017-11-03 RX ADMIN — PROPRANOLOL HYDROCHLORIDE SCH MG: 20 TABLET ORAL at 09:02

## 2017-11-03 RX ADMIN — INSULIN LISPRO SCH UNIT: 100 INJECTION, SOLUTION INTRAVENOUS; SUBCUTANEOUS at 12:53

## 2017-11-03 RX ADMIN — LITHIUM CARBONATE SCH MG: 300 CAPSULE, GELATIN COATED ORAL at 20:45

## 2017-11-03 RX ADMIN — INSULIN LISPRO SCH: 100 INJECTION, SOLUTION INTRAVENOUS; SUBCUTANEOUS at 08:20

## 2017-11-03 RX ADMIN — LISINOPRIL SCH MG: 20 TABLET ORAL at 20:47

## 2017-11-03 RX ADMIN — LITHIUM CARBONATE SCH MG: 300 CAPSULE, GELATIN COATED ORAL at 09:02

## 2017-11-03 RX ADMIN — PROPRANOLOL HYDROCHLORIDE SCH MG: 20 TABLET ORAL at 20:47

## 2017-11-03 RX ADMIN — LORATADINE, PSEUDOEPHEDRINE SULFATE SCH EACH: 5; 120 TABLET, FILM COATED, EXTENDED RELEASE ORAL at 09:02

## 2017-11-03 RX ADMIN — INSULIN LISPRO SCH: 100 INJECTION, SOLUTION INTRAVENOUS; SUBCUTANEOUS at 17:37

## 2017-11-03 RX ADMIN — CLOTRIMAZOLE SCH APPLIC: 0.01 CREAM TOPICAL at 09:01

## 2017-11-03 NOTE — P.PN
Subjective


Principal diagnosis: 





OCD








Interval History:





Patient continues to endorse intrusive thoughts of self harm. He has EDS that 

is observed throughout the day independent of Ativan dosing and likely 

secondary to moderate to severe uncontrolled FELICITA. [place] 

[place] bent forward nearly toppling over and/or sliding out 

dosing in and out of slumber. Patient was easily aroused and stated he felt 

worse today despite sleeping well last night. When asked why he was sleeping in 

the lounge where there was lots of noise and activity, patient stated that he 

thought he'd get in trouble if he went back to bed for a nap. He reported some 

concern about staff telling him he had to get out of bed. Patient was escorted 

back to room and allowed to sleep. Staff were informed to prompt patient in a 

few hours to get up if he will to participate in groups and activities, but to 

make clear to patient he can sleep at any time given patient was on the verge 

of sliding off the couch prior to interview. 





Mental Status Exam:





   Appearance:  alert, well groomed, appears stated age, steady gait 


   Behavior: no psychomotor agitation or psychomotor retardation,  no abnormal 

movements, fair eye contact


   Attitude: cooperative 


   Speech:  normal rate, rhythm,  fluency, articulation, volume, and prosody;  

primary language: English


   Mood: anxious (improved?)


   Affect: dull, dim, restricted


   Thought processes:  linear


   Thought content: patient does not appear to be responding to internal 

stimuli; patient denies auditory and visual hallucinations, no delusions 

appreciated, endorses 


      obsessions and compulsions daily that are    


   overwhelming with obsessional content to harm self and feelings of hopeless 

and fatigue and just "giving up"


   Insight: fair


   Judgment: fair 


   Cognitive:  oriented to all 3 spheres, average intelligence





Plan:





   Continue hospitalization, patient continues to struggle with SI





   Continue Ativan 1-mg PO AM + 0.25-mg PO TID





   Updated Lithium and BMP ordered





   Otherwise continue current regimen





   SW has been asked to contact [facility] to discharge options for OCD such as 

Exposure Therapy    





Objective





- Vital Signs


Vital signs: 


 Vital Signs











Temp  97.8 F   11/03/17 06:46


 


Pulse  82   11/03/17 09:03


 


Resp  18   11/03/17 09:03


 


BP  136/81   11/03/17 09:03


 


Pulse Ox  98   10/26/17 22:25














- Labs


CBC & Chem 7: 


 11/01/17 08:51





 10/28/17 08:15


Labs: 


 Abnormal Lab Results - Last 24 Hours (Table)











  11/03/17 11/03/17 11/03/17 Range/Units





  06:38 12:28 17:20 


 


POC Glucose (mg/dL)  116 H  154 H  123 H  (75-99)  mg/dL














  11/03/17 Range/Units





  20:11 


 


POC Glucose (mg/dL)  215 H  (75-99)  mg/dL














Assessment and Plan


(1) Obsessive compulsive disorder


Current Visit: Yes   Status: Chronic   Priority: High   Code(s): F42.9 - 

OBSESSIVE-COMPULSIVE DISORDER, UNSPECIFIED   SNOMED Code(s): 422012387


   





(2) Alcohol use disorder, severe, in sustained remission


Current Visit: Yes   Status: Chronic   Priority: Medium   Code(s): F10.21 - 

ALCOHOL DEPENDENCE, IN REMISSION   SNOMED Code(s): 99284140

## 2017-11-03 NOTE — P.PN
Subjective


Progress Note Date: 11/02/17


Principal diagnosis: 





OCD








Interval History:





Scheduled Ativan somewhat effective per patient in making him feel more relaxed 

and lessening the burden of obsessional content, however, patient is still 

sedated. It is uncertain if patient is being sedated by Ativan at this point or 

this is just EDS resulting from chronic, untreated FELICITA. Patient states he is 

supposed to be using a CPAP at home but was noncompliant and lost his CPAP 

coverage.He continues to attend groups and attempt to participate, but he still 

endorses obsessional thoughts of self harm. Ativan has helped patient relax, 

patient thinks the medication is helping. 





Mental Status Exam:





   Appearance:  alert, well groomed, appears stated age, steady gait 


   Behavior: no psychomotor agitation or psychomotor retardation,  no abnormal 

movements, fair eye contact


   Attitude: cooperative 


   Speech:  normal rate, rhythm,  fluency, articulation, volume, and prosody;  

primary language: English


   Mood: anxious (improved?)


   Affect: dull, dim, restricted


   Thought processes:  linear


   Thought content: patient does not appear to be responding to internal 

stimuli; patient denies auditory and visual hallucinations, no delusions 

appreciated, endorses 


      obsessions and compulsions daily that are    


   overwhelming with obsessional content to harm self and feelings of hopeless 

and fatigue and just "giving up"


   Insight: fair


   Judgment: fair 


   Cognitive:  oriented to all 3 spheres, average intelligence





Plan:





   Continue hospitalization, patient continues to struggle with SI





   Decrease and change Ativan to 1-mg PO AM + 0.25-mg PO TID scheduled instead 

of PRN





   VS, Labs reviewed wnls





   Otherwise continue current regimen





   SW has been asked to contact [facility] to discharge options for OCD such as 

Exposure Therapy   





Objective





- Vital Signs


Vital signs: 


 Vital Signs











Temp  97.8 F   11/03/17 06:46


 


Pulse  82   11/03/17 09:03


 


Resp  18   11/03/17 09:03


 


BP  136/81   11/03/17 09:03


 


Pulse Ox  98   10/26/17 22:25














- Labs


CBC & Chem 7: 


 11/01/17 08:51





 10/28/17 08:15


Labs: 


 Abnormal Lab Results - Last 24 Hours (Table)











  11/02/17 11/02/17 11/03/17 Range/Units





  12:40 17:36 06:38 


 


POC Glucose (mg/dL)  142 H  127 H  116 H  (75-99)  mg/dL














Assessment and Plan


(1) Obsessive compulsive disorder


Current Visit: Yes   Status: Chronic   Priority: High   Code(s): F42.9 - 

OBSESSIVE-COMPULSIVE DISORDER, UNSPECIFIED   SNOMED Code(s): 081287378


   





(2) Alcohol use disorder, severe, in sustained remission


Current Visit: Yes   Status: Chronic   Priority: Medium   Code(s): F10.21 - 

ALCOHOL DEPENDENCE, IN REMISSION   SNOMED Code(s): 36662039

## 2017-11-04 LAB
ANION GAP SERPL CALC-SCNC: 9 MMOL/L
BUN SERPL-SCNC: 21 MG/DL (ref 9–20)
CALCIUM SPEC-MCNC: 9.4 MG/DL (ref 8.4–10.2)
CHLORIDE SERPL-SCNC: 106 MMOL/L (ref 98–107)
CO2 SERPL-SCNC: 28 MMOL/L (ref 22–30)
GLUCOSE BLD-MCNC: 120 MG/DL (ref 75–99)
GLUCOSE BLD-MCNC: 121 MG/DL (ref 75–99)
GLUCOSE BLD-MCNC: 143 MG/DL (ref 75–99)
GLUCOSE BLD-MCNC: 170 MG/DL (ref 75–99)
GLUCOSE SERPL-MCNC: 134 MG/DL (ref 74–99)
LITHIUM SERPL-MCNC: 0.5 MMOL/L
NON-AFRICAN AMERICAN GFR(MDRD): 54
POTASSIUM SERPL-SCNC: 3.8 MMOL/L (ref 3.5–5.1)
SODIUM SERPL-SCNC: 143 MMOL/L (ref 137–145)

## 2017-11-04 RX ADMIN — LITHIUM CARBONATE SCH MG: 300 CAPSULE, GELATIN COATED ORAL at 08:44

## 2017-11-04 RX ADMIN — LITHIUM CARBONATE SCH MG: 300 CAPSULE, GELATIN COATED ORAL at 21:52

## 2017-11-04 RX ADMIN — LORATADINE, PSEUDOEPHEDRINE SULFATE SCH EACH: 5; 120 TABLET, FILM COATED, EXTENDED RELEASE ORAL at 21:53

## 2017-11-04 RX ADMIN — PROPRANOLOL HYDROCHLORIDE SCH MG: 20 TABLET ORAL at 08:44

## 2017-11-04 RX ADMIN — INSULIN LISPRO SCH: 100 INJECTION, SOLUTION INTRAVENOUS; SUBCUTANEOUS at 17:11

## 2017-11-04 RX ADMIN — PROPRANOLOL HYDROCHLORIDE SCH MG: 20 TABLET ORAL at 21:53

## 2017-11-04 RX ADMIN — LISINOPRIL SCH MG: 20 TABLET ORAL at 21:52

## 2017-11-04 RX ADMIN — INSULIN LISPRO SCH UNIT: 100 INJECTION, SOLUTION INTRAVENOUS; SUBCUTANEOUS at 20:59

## 2017-11-04 RX ADMIN — CLOTRIMAZOLE SCH: 0.01 CREAM TOPICAL at 21:01

## 2017-11-04 RX ADMIN — INSULIN LISPRO SCH: 100 INJECTION, SOLUTION INTRAVENOUS; SUBCUTANEOUS at 12:32

## 2017-11-04 RX ADMIN — INSULIN LISPRO SCH UNIT: 100 INJECTION, SOLUTION INTRAVENOUS; SUBCUTANEOUS at 07:38

## 2017-11-04 RX ADMIN — CLOTRIMAZOLE SCH APPLIC: 0.01 CREAM TOPICAL at 08:51

## 2017-11-04 RX ADMIN — LORATADINE, PSEUDOEPHEDRINE SULFATE SCH EACH: 5; 120 TABLET, FILM COATED, EXTENDED RELEASE ORAL at 08:44

## 2017-11-04 NOTE — P.PN
Progress Note - Text





Interval history: The patient is found in his room he follows me to an 

interview room.  He reports his mood continues to be depressed.  He's been 

sleeping during the day but states he still has been able to sleep at night.  

He is receiving Ativan dosing during the day we discussed whether or not that 

could be contributing to sedation.  He focuses today on obsessive compulsive 

thoughts and actions mainly in the form of checking.  He states he hears a 

voice that frequently as derogatory.  He states he chronically has suicidal 

ideation and they do appear to be more exacerbated prior to this admission.  He 

feels he will keep himself safe here on the mental health unit.  He reports 

selectively attending groups stating he is too depressed to attend.  We 

discussed that it's more likely attending groups would help his depression than 

staying in a dark room and sleeping.





Mental status exam: The patient is an overweight  male he stressors 

unclothing he has a disheveled appearance.  He seated calmly in the chair he 

has no spontaneous speech but provides reef answers to questions asked.  He 

endorses a depressed mood with hopelessness thinking and suicidal thoughts.  He 

endorses no thoughts of harming others.  He endorses an occasional auditory 

hallucination but no visual hallucinations.  He is endorsing no specific 

delusions at this time and feels safe in the hospital.  He demonstrates no 

abnormal involuntary movements he demonstrates no verbal or physical 

aggressiveness.  He is oriented to person place and date.  Affect is quite 

bland.





Plan: The patient will continue on his current medications we will monitor for 

sedation.  His BUN was mildly elevated creatinine was elevated at 1.4.  He has 

been on lithium he states for approximately one year at the current dose.  We 

will repeat the BUN/creatinine creatinine tomorrow morning he is encouraged to 

adequately hydrate.  We will encourage him to attend groups and participate in 

the milieu.  We will continue to monitor him for safety.

## 2017-11-05 LAB
BUN SERPL-SCNC: 21 MG/DL (ref 9–20)
GLUCOSE BLD-MCNC: 119 MG/DL (ref 75–99)
GLUCOSE BLD-MCNC: 195 MG/DL (ref 75–99)
GLUCOSE BLD-MCNC: 214 MG/DL (ref 75–99)
GLUCOSE BLD-MCNC: 97 MG/DL (ref 75–99)
NON-AFRICAN AMERICAN GFR(MDRD): 53

## 2017-11-05 RX ADMIN — INSULIN LISPRO SCH: 100 INJECTION, SOLUTION INTRAVENOUS; SUBCUTANEOUS at 07:43

## 2017-11-05 RX ADMIN — INSULIN LISPRO SCH: 100 INJECTION, SOLUTION INTRAVENOUS; SUBCUTANEOUS at 17:26

## 2017-11-05 RX ADMIN — INSULIN LISPRO SCH UNIT: 100 INJECTION, SOLUTION INTRAVENOUS; SUBCUTANEOUS at 20:34

## 2017-11-05 RX ADMIN — INSULIN LISPRO SCH UNIT: 100 INJECTION, SOLUTION INTRAVENOUS; SUBCUTANEOUS at 12:30

## 2017-11-05 RX ADMIN — PROPRANOLOL HYDROCHLORIDE SCH MG: 20 TABLET ORAL at 08:52

## 2017-11-05 RX ADMIN — BENZTROPINE MESYLATE SCH MG: 0.5 TABLET ORAL at 20:59

## 2017-11-05 RX ADMIN — BENZTROPINE MESYLATE SCH MG: 0.5 TABLET ORAL at 11:20

## 2017-11-05 RX ADMIN — CLOTRIMAZOLE SCH APPLIC: 0.01 CREAM TOPICAL at 08:50

## 2017-11-05 RX ADMIN — LORATADINE, PSEUDOEPHEDRINE SULFATE SCH EACH: 5; 120 TABLET, FILM COATED, EXTENDED RELEASE ORAL at 08:52

## 2017-11-05 RX ADMIN — LITHIUM CARBONATE SCH MG: 300 CAPSULE, GELATIN COATED ORAL at 20:59

## 2017-11-05 RX ADMIN — LORATADINE, PSEUDOEPHEDRINE SULFATE SCH EACH: 5; 120 TABLET, FILM COATED, EXTENDED RELEASE ORAL at 21:00

## 2017-11-05 RX ADMIN — CLOTRIMAZOLE SCH APPLIC: 0.01 CREAM TOPICAL at 20:58

## 2017-11-05 RX ADMIN — PROPRANOLOL HYDROCHLORIDE SCH MG: 20 TABLET ORAL at 20:58

## 2017-11-05 RX ADMIN — LISINOPRIL SCH MG: 20 TABLET ORAL at 21:00

## 2017-11-05 RX ADMIN — LITHIUM CARBONATE SCH MG: 300 CAPSULE, GELATIN COATED ORAL at 08:52

## 2017-11-05 NOTE — P.PN
Progress Note - Text





Interval history: The patient is found in group he follows me to an interview 

room.  He reports his mood is depressed he continues to have suicidal thoughts 

but he feels safe here in the hospital and would not act on them.  He reports 

sleeping last night appetite is stable.  He states he did better with staying 

awake during the day yesterday and attending groups.  He has no questions 

regarding his medication.  His BUN and creatinine were drawn again this morning 

we will await the results.  He has been adequately hydrating.





Mental status exam: The patient is an overweight  male he seated 

calmly in his chair.  Eye contact is appropriate.  Speech is fluent 

nonpressured.  He reports a depressed mood with ongoing suicidal ideation 

however he is known to have those thoughts chronically.  He demonstrates no 

verbal or physical aggressiveness.  There is no observable evidence of 

psychosis during the session today.  No abnormal involuntary movements 

observed.  He is cooperative he is easily directed in the session.  Affect is 

constricted.  He does demonstrate a limited range of expression.  He remains 

oriented to person place and date.





Plan: The patient will continue on his current medications we will await lab 

results drawn from this morning.  He is encouraged to again stay awake during 

the day and attending groups.  Vital signs reviewed.

## 2017-11-06 LAB
GLUCOSE BLD-MCNC: 117 MG/DL (ref 75–99)
GLUCOSE BLD-MCNC: 122 MG/DL (ref 75–99)
GLUCOSE BLD-MCNC: 134 MG/DL (ref 75–99)
GLUCOSE BLD-MCNC: 221 MG/DL (ref 75–99)

## 2017-11-06 RX ADMIN — CLOTRIMAZOLE SCH APPLIC: 0.01 CREAM TOPICAL at 21:46

## 2017-11-06 RX ADMIN — INSULIN LISPRO SCH: 100 INJECTION, SOLUTION INTRAVENOUS; SUBCUTANEOUS at 07:39

## 2017-11-06 RX ADMIN — INSULIN LISPRO SCH UNIT: 100 INJECTION, SOLUTION INTRAVENOUS; SUBCUTANEOUS at 17:30

## 2017-11-06 RX ADMIN — LORATADINE, PSEUDOEPHEDRINE SULFATE SCH EACH: 5; 120 TABLET, FILM COATED, EXTENDED RELEASE ORAL at 21:21

## 2017-11-06 RX ADMIN — LITHIUM CARBONATE SCH MG: 300 CAPSULE, GELATIN COATED ORAL at 21:20

## 2017-11-06 RX ADMIN — BENZTROPINE MESYLATE SCH MG: 0.5 TABLET ORAL at 21:20

## 2017-11-06 RX ADMIN — INSULIN LISPRO SCH UNIT: 100 INJECTION, SOLUTION INTRAVENOUS; SUBCUTANEOUS at 20:14

## 2017-11-06 RX ADMIN — LORATADINE, PSEUDOEPHEDRINE SULFATE SCH EACH: 5; 120 TABLET, FILM COATED, EXTENDED RELEASE ORAL at 09:10

## 2017-11-06 RX ADMIN — INSULIN LISPRO SCH: 100 INJECTION, SOLUTION INTRAVENOUS; SUBCUTANEOUS at 13:04

## 2017-11-06 RX ADMIN — PROPRANOLOL HYDROCHLORIDE SCH MG: 20 TABLET ORAL at 21:19

## 2017-11-06 RX ADMIN — LISINOPRIL SCH MG: 20 TABLET ORAL at 21:46

## 2017-11-06 RX ADMIN — CLOTRIMAZOLE SCH APPLIC: 0.01 CREAM TOPICAL at 09:11

## 2017-11-06 RX ADMIN — LITHIUM CARBONATE SCH MG: 300 CAPSULE, GELATIN COATED ORAL at 09:12

## 2017-11-06 RX ADMIN — PROPRANOLOL HYDROCHLORIDE SCH MG: 20 TABLET ORAL at 09:08

## 2017-11-06 RX ADMIN — BENZTROPINE MESYLATE SCH MG: 0.5 TABLET ORAL at 09:09

## 2017-11-06 NOTE — P.PN
Progress Note - Text


Progress Note Date: 11/07/17








Interval History:





Patient continues to struggle with intrusive thoughts of self harm and SI. 

Unchanged from previous exam,  however peers to be interesting better with 

peers on unit and this increased socialization has prognosticated the start of 

improvement in the past. 





Mental Status Exam:





   Appearance:  alert, well groomed, appears stated age, steady gait 


   Behavior: no psychomotor agitation or psychomotor retardation,  no abnormal 

movements, fair eye contact


   Attitude: cooperative 


   Speech:  normal rate, rhythm,  fluency, articulation, volume, and prosody;  

primary language: English


   Mood: anxious (improved?)


   Affect: dull, dim, restricted


   Thought processes:  linear


   Thought content: patient does not appear to be responding to internal 

stimuli; patient denies auditory and visual hallucinations, no delusions 

appreciated, endorses 


      obsessions and compulsions daily that are    


   overwhelming with obsessional content to harm self and feelings of hopeless 

and fatigue and just "giving up"


   Insight: fair


   Judgment: fair 


   Cognitive:  oriented to all 3 spheres, average intelligence





Plan:





   Continue hospitalization, patient continues to struggle with SI, no 

medications warranted today








   VS, patient is dehydrated with slightly elevated CR, order placed to 

encourage fluids





   Otherwise continue current regimen





   SW has been asked to contact [facility] to discharge options for OCD such as 

Exposure Therapy

## 2017-11-07 LAB
ANION GAP SERPL CALC-SCNC: 10 MMOL/L
BUN SERPL-SCNC: 25 MG/DL (ref 9–20)
CALCIUM SPEC-MCNC: 9.9 MG/DL (ref 8.4–10.2)
CHLORIDE SERPL-SCNC: 109 MMOL/L (ref 98–107)
CO2 SERPL-SCNC: 26 MMOL/L (ref 22–30)
GLUCOSE BLD-MCNC: 121 MG/DL (ref 75–99)
GLUCOSE BLD-MCNC: 138 MG/DL (ref 75–99)
GLUCOSE BLD-MCNC: 150 MG/DL (ref 75–99)
GLUCOSE BLD-MCNC: 202 MG/DL (ref 75–99)
GLUCOSE SERPL-MCNC: 143 MG/DL (ref 74–99)
NON-AFRICAN AMERICAN GFR(MDRD): 57
POTASSIUM SERPL-SCNC: 3.9 MMOL/L (ref 3.5–5.1)
SODIUM SERPL-SCNC: 145 MMOL/L (ref 137–145)

## 2017-11-07 RX ADMIN — LORATADINE, PSEUDOEPHEDRINE SULFATE SCH EACH: 5; 120 TABLET, FILM COATED, EXTENDED RELEASE ORAL at 09:19

## 2017-11-07 RX ADMIN — CLOTRIMAZOLE SCH APPLIC: 0.01 CREAM TOPICAL at 09:18

## 2017-11-07 RX ADMIN — PROPRANOLOL HYDROCHLORIDE SCH MG: 20 TABLET ORAL at 09:20

## 2017-11-07 RX ADMIN — LITHIUM CARBONATE SCH MG: 300 CAPSULE, GELATIN COATED ORAL at 21:50

## 2017-11-07 RX ADMIN — BENZTROPINE MESYLATE SCH MG: 0.5 TABLET ORAL at 09:17

## 2017-11-07 RX ADMIN — LORATADINE, PSEUDOEPHEDRINE SULFATE SCH EACH: 5; 120 TABLET, FILM COATED, EXTENDED RELEASE ORAL at 21:50

## 2017-11-07 RX ADMIN — CLOTRIMAZOLE SCH APPLIC: 0.01 CREAM TOPICAL at 21:48

## 2017-11-07 RX ADMIN — INSULIN LISPRO SCH UNIT: 100 INJECTION, SOLUTION INTRAVENOUS; SUBCUTANEOUS at 20:20

## 2017-11-07 RX ADMIN — INSULIN LISPRO SCH: 100 INJECTION, SOLUTION INTRAVENOUS; SUBCUTANEOUS at 17:54

## 2017-11-07 RX ADMIN — BENZTROPINE MESYLATE SCH MG: 0.5 TABLET ORAL at 21:48

## 2017-11-07 RX ADMIN — INSULIN LISPRO SCH UNIT: 100 INJECTION, SOLUTION INTRAVENOUS; SUBCUTANEOUS at 08:16

## 2017-11-07 RX ADMIN — LISINOPRIL SCH MG: 20 TABLET ORAL at 21:49

## 2017-11-07 RX ADMIN — PROPRANOLOL HYDROCHLORIDE SCH MG: 20 TABLET ORAL at 21:50

## 2017-11-07 RX ADMIN — INSULIN LISPRO SCH UNIT: 100 INJECTION, SOLUTION INTRAVENOUS; SUBCUTANEOUS at 12:56

## 2017-11-07 RX ADMIN — LITHIUM CARBONATE SCH MG: 300 CAPSULE, GELATIN COATED ORAL at 09:18

## 2017-11-07 NOTE — P.PN
Progress Note - Text


Progress Note Date: 11/07/17








Interval History:





Patient continues to struggle with intrusive thoughts of self harm and SI. 

Unchanged from previous exam,  however peers to be interesting better with 

peers on unit and this increased socialization has prognosticated the start of 

improvement in the past. Patient continues to participate in group and 

recreational therapies He is eating well and as a whole sleeping better[facility] OCD, SW continues to seek 

outside resources.





Mental Status Exam:





   Appearance:  alert, well groomed, appears stated age, steady gait 


   Behavior: no psychomotor agitation or psychomotor retardation,  no abnormal 

movements, fair eye contact


   Attitude: cooperative 


   Speech:  normal rate, rhythm,  fluency, articulation, volume, and prosody;  

primary language: English


   Mood: anxious (improved?)


   Affect: dull, dim, restricted


   Thought processes:  linear


   Thought content: patient does not appear to be responding to internal 

stimuli; patient denies auditory and visual hallucinations, no delusions 

appreciated, endorses 


      obsessions and compulsions daily that are    


   overwhelming with obsessional content to harm self and feelings of hopeless 

and fatigue and just "giving up"


   Insight: fair


   Judgment: fair 


   Cognitive:  oriented to all 3 spheres, average intelligence





Plan:





   Continue hospitalization, patient continues to struggle with SI, no 

medications warranted today








   Continue patient is dehydrated with slightly elevated CR, order placed to 

encourage fluids





   Decrease Ativan 0.50mg PO QAM + 0.5-mg PO TID otherwise continue current 

regimen





   SW has been asked to contact outside resources  to discharge options for 

OCD such as Exposure Therapy

## 2017-11-08 LAB
ANION GAP SERPL CALC-SCNC: 9 MMOL/L
BASOPHILS # BLD AUTO: 0 K/UL (ref 0–0.2)
BASOPHILS NFR BLD AUTO: 0 %
BUN SERPL-SCNC: 22 MG/DL (ref 9–20)
CALCIUM SPEC-MCNC: 9.5 MG/DL (ref 8.4–10.2)
CH: 29.6
CHCM: 34.7
CHLORIDE SERPL-SCNC: 104 MMOL/L (ref 98–107)
CO2 SERPL-SCNC: 27 MMOL/L (ref 22–30)
EOSINOPHIL # BLD AUTO: 0.2 K/UL (ref 0–0.7)
EOSINOPHIL NFR BLD AUTO: 2 %
ERYTHROCYTE [DISTWIDTH] IN BLOOD BY AUTOMATED COUNT: 4.9 M/UL (ref 4.3–5.9)
ERYTHROCYTE [DISTWIDTH] IN BLOOD: 14 % (ref 11.5–15.5)
GLUCOSE BLD-MCNC: 100 MG/DL (ref 75–99)
GLUCOSE BLD-MCNC: 122 MG/DL (ref 75–99)
GLUCOSE BLD-MCNC: 144 MG/DL (ref 75–99)
GLUCOSE BLD-MCNC: 187 MG/DL (ref 75–99)
GLUCOSE SERPL-MCNC: 241 MG/DL (ref 74–99)
HCT VFR BLD AUTO: 42.1 % (ref 39–53)
HDW: 2.88
HGB BLD-MCNC: 14.4 GM/DL (ref 13–17.5)
LITHIUM SERPL-MCNC: 0.4 MMOL/L
LUC NFR BLD AUTO: 1 %
LYMPHOCYTES # SPEC AUTO: 1.9 K/UL (ref 1–4.8)
LYMPHOCYTES NFR SPEC AUTO: 19 %
MCH RBC QN AUTO: 29.3 PG (ref 25–35)
MCHC RBC AUTO-ENTMCNC: 34.2 G/DL (ref 31–37)
MCV RBC AUTO: 85.8 FL (ref 80–100)
MONOCYTES # BLD AUTO: 0.4 K/UL (ref 0–1)
MONOCYTES NFR BLD AUTO: 4 %
NEUTROPHILS # BLD AUTO: 7.3 K/UL (ref 1.3–7.7)
NEUTROPHILS NFR BLD AUTO: 74 %
NON-AFRICAN AMERICAN GFR(MDRD): 54
POTASSIUM SERPL-SCNC: 3.6 MMOL/L (ref 3.5–5.1)
SODIUM SERPL-SCNC: 140 MMOL/L (ref 137–145)
WBC # BLD AUTO: 0.08 10*3/UL
WBC # BLD AUTO: 10 K/UL (ref 3.8–10.6)
WBC (PEROX): 10.04

## 2017-11-08 RX ADMIN — LITHIUM CARBONATE SCH MG: 300 CAPSULE, GELATIN COATED ORAL at 20:54

## 2017-11-08 RX ADMIN — CLOTRIMAZOLE SCH APPLIC: 0.01 CREAM TOPICAL at 09:17

## 2017-11-08 RX ADMIN — LORATADINE, PSEUDOEPHEDRINE SULFATE SCH EACH: 5; 120 TABLET, FILM COATED, EXTENDED RELEASE ORAL at 20:54

## 2017-11-08 RX ADMIN — INSULIN ASPART SCH: 100 INJECTION, SOLUTION INTRAVENOUS; SUBCUTANEOUS at 16:56

## 2017-11-08 RX ADMIN — LISINOPRIL SCH MG: 20 TABLET ORAL at 20:54

## 2017-11-08 RX ADMIN — BENZTROPINE MESYLATE SCH MG: 0.5 TABLET ORAL at 09:13

## 2017-11-08 RX ADMIN — LORATADINE, PSEUDOEPHEDRINE SULFATE SCH EACH: 5; 120 TABLET, FILM COATED, EXTENDED RELEASE ORAL at 09:14

## 2017-11-08 RX ADMIN — PROPRANOLOL HYDROCHLORIDE SCH MG: 20 TABLET ORAL at 09:14

## 2017-11-08 RX ADMIN — INSULIN ASPART SCH UNIT: 100 INJECTION, SOLUTION INTRAVENOUS; SUBCUTANEOUS at 21:07

## 2017-11-08 RX ADMIN — LITHIUM CARBONATE SCH MG: 300 CAPSULE, GELATIN COATED ORAL at 09:57

## 2017-11-08 RX ADMIN — INSULIN LISPRO SCH UNIT: 100 INJECTION, SOLUTION INTRAVENOUS; SUBCUTANEOUS at 13:23

## 2017-11-08 RX ADMIN — CLOTRIMAZOLE SCH APPLIC: 0.01 CREAM TOPICAL at 20:51

## 2017-11-08 RX ADMIN — BENZTROPINE MESYLATE SCH MG: 0.5 TABLET ORAL at 20:51

## 2017-11-08 RX ADMIN — PROPRANOLOL HYDROCHLORIDE SCH MG: 20 TABLET ORAL at 20:55

## 2017-11-08 RX ADMIN — INSULIN LISPRO SCH: 100 INJECTION, SOLUTION INTRAVENOUS; SUBCUTANEOUS at 07:39

## 2017-11-08 NOTE — P.PN
Subjective


Principal diagnosis: 





OCD

















Interval History:





Patient reports today some improvements in depressed mood although he continues 

to endorse intrusive suicidal obsessional thoughts to self harm/kill. Patient 

spoke with sister. Family remain strong, positive influence. Patient is 

attending [employer] groups. Talked to sister on phone and reports it went well.





Mental Status Exam:





   Appearance:  alert, well groomed, appears stated age, steady gait 


   Behavior: no psychomotor agitation or psychomotor retardation,  no abnormal 

movements, fair eye contact


   Attitude: cooperative 


   Speech:  normal rate, rhythm,  fluency, articulation, volume, and prosody;  

primary language: English


   Mood: anxious (improved?)


   Affect: dull, dim, restricted


   Thought processes:  linear


   Thought content: patient does not appear to be responding to internal 

stimuli; patient denies auditory and visual hallucinations, no delusions 

appreciated, endorses 


      obsessions and compulsions daily that are    


   overwhelming with obsessional content to harm self and feelings of hopeless 

and fatigue and just "giving up"


   Insight: fair


   Judgment: fair 


   Cognitive:  oriented to all 3 spheres, average intelligence





Plan:





   Continue hospitalization, patient continues to struggle with SI, no 

medications warranted today





   Continue  encourage fluids





   


   SW contact [facility] and other resource in the local area for Exposure therapy OP 

   





Objective





- Vital Signs


Vital signs: 


 Vital Signs











Temp  97.9 F   11/08/17 06:35


 


Pulse  71   11/08/17 09:10


 


Resp  16   11/08/17 09:10


 


BP  142/90   11/08/17 09:10


 


Pulse Ox  98   10/26/17 22:25














- Labs


CBC & Chem 7: 


 11/08/17 09:53





 11/08/17 09:53


Labs: 


 Abnormal Lab Results - Last 24 Hours (Table)











  11/07/17 11/07/17 11/07/17 Range/Units





  12:47 17:48 20:18 


 


POC Glucose (mg/dL)  138 H  121 H  202 H  (75-99)  mg/dL














  11/08/17 Range/Units





  06:29 


 


POC Glucose (mg/dL)  122 H  (75-99)  mg/dL














Assessment and Plan


(1) Obsessive compulsive disorder


Current Visit: Yes   Status: Chronic   Priority: High   Code(s): F42.9 - 

OBSESSIVE-COMPULSIVE DISORDER, UNSPECIFIED   SNOMED Code(s): 495663684


   





(2) Alcohol use disorder, severe, in sustained remission


Current Visit: Yes   Status: Chronic   Priority: Medium   Code(s): F10.21 - 

ALCOHOL DEPENDENCE, IN REMISSION   SNOMED Code(s): 63338191

## 2017-11-09 LAB
ANION GAP SERPL CALC-SCNC: 8 MMOL/L
BUN SERPL-SCNC: 22 MG/DL (ref 9–20)
CALCIUM SPEC-MCNC: 9.4 MG/DL (ref 8.4–10.2)
CHLORIDE SERPL-SCNC: 109 MMOL/L (ref 98–107)
CO2 SERPL-SCNC: 28 MMOL/L (ref 22–30)
GLUCOSE BLD-MCNC: 107 MG/DL (ref 75–99)
GLUCOSE BLD-MCNC: 141 MG/DL (ref 75–99)
GLUCOSE BLD-MCNC: 195 MG/DL (ref 75–99)
GLUCOSE BLD-MCNC: 207 MG/DL (ref 75–99)
GLUCOSE SERPL-MCNC: 124 MG/DL (ref 74–99)
NON-AFRICAN AMERICAN GFR(MDRD): 56
POTASSIUM SERPL-SCNC: 3.8 MMOL/L (ref 3.5–5.1)
SODIUM SERPL-SCNC: 145 MMOL/L (ref 137–145)

## 2017-11-09 RX ADMIN — LITHIUM CARBONATE SCH MG: 300 CAPSULE, GELATIN COATED ORAL at 21:07

## 2017-11-09 RX ADMIN — LORATADINE, PSEUDOEPHEDRINE SULFATE SCH EACH: 5; 120 TABLET, FILM COATED, EXTENDED RELEASE ORAL at 09:10

## 2017-11-09 RX ADMIN — LISINOPRIL SCH MG: 20 TABLET ORAL at 21:08

## 2017-11-09 RX ADMIN — LITHIUM CARBONATE SCH MG: 300 CAPSULE, GELATIN COATED ORAL at 09:10

## 2017-11-09 RX ADMIN — INSULIN ASPART SCH UNIT: 100 INJECTION, SOLUTION INTRAVENOUS; SUBCUTANEOUS at 08:05

## 2017-11-09 RX ADMIN — INSULIN ASPART SCH UNIT: 100 INJECTION, SOLUTION INTRAVENOUS; SUBCUTANEOUS at 21:03

## 2017-11-09 RX ADMIN — LORATADINE, PSEUDOEPHEDRINE SULFATE SCH EACH: 5; 120 TABLET, FILM COATED, EXTENDED RELEASE ORAL at 21:08

## 2017-11-09 RX ADMIN — CLOTRIMAZOLE SCH APPLIC: 0.01 CREAM TOPICAL at 21:06

## 2017-11-09 RX ADMIN — INSULIN ASPART SCH UNIT: 100 INJECTION, SOLUTION INTRAVENOUS; SUBCUTANEOUS at 13:07

## 2017-11-09 RX ADMIN — PROPRANOLOL HYDROCHLORIDE SCH MG: 20 TABLET ORAL at 21:07

## 2017-11-09 RX ADMIN — INSULIN ASPART SCH: 100 INJECTION, SOLUTION INTRAVENOUS; SUBCUTANEOUS at 17:31

## 2017-11-09 RX ADMIN — CLOTRIMAZOLE SCH APPLIC: 0.01 CREAM TOPICAL at 09:11

## 2017-11-09 RX ADMIN — BENZTROPINE MESYLATE SCH MG: 0.5 TABLET ORAL at 09:09

## 2017-11-09 RX ADMIN — BENZTROPINE MESYLATE SCH MG: 0.5 TABLET ORAL at 21:08

## 2017-11-09 RX ADMIN — PROPRANOLOL HYDROCHLORIDE SCH MG: 20 TABLET ORAL at 09:10

## 2017-11-09 NOTE — P.PN
Subjective


Progress Note Date: 11/09/17


Principal diagnosis: 





OCD

















Interval History:





Patient continues to improve and is noted this afternoon to be smiling and 

joking with staff and peers. His affect is marked brighter and more reactive 

than admission. Patient continues to endorse obsessional thoughts but report 

today they are attenuated almost entirely with social engagement. Patient 

compliant with all medications. Attends all meals. Attends most groups, and 

reports he is sleeping well.





Mental Status Exam:





   Appearance:  alert, well groomed, appears stated age, steady gait 


   Behavior: no psychomotor agitation or psychomotor retardation,  no abnormal 

movements, fair eye contact


   Attitude: cooperative 


   Speech:  normal rate, rhythm,  fluency, articulation, volume, and prosody;  

primary language: English


   Mood: jocular


   Affect: bright, reactive, congruent


   Thought processes:  linear


   Thought content: patient does not appear to be responding to internal 

stimuli; patient denies auditory and visual hallucinations, no delusions 

appreciated, endorses 


      obsessions that are still disturbing but attenuate with social engagement


   Insight: fair


   Judgment: fair 


   Cognitive:  oriented to all 3 spheres, average intelligence





Plan:





   Continue hospitalization, patient continues to struggle with SI, no 

medications warranted today





   Continue current regimen





   Continue to  encourage fluids, CR is still high





   SW contact [facility] to arrange OP community based resources resources as patient 

as patient responds well to any form of social engagement    





Objective





- Vital Signs


Vital signs: 


 Vital Signs











Temp  97.8 F   11/09/17 06:46


 


Pulse  70   11/09/17 17:57


 


Resp  14   11/09/17 09:10


 


BP  147/95   11/09/17 17:57


 


Pulse Ox  98   10/26/17 22:25








 Intake & Output











 11/09/17 11/09/17 11/10/17





 06:59 18:59 06:59


 


Weight  95.9 kg 














- Labs


CBC & Chem 7: 


 11/08/17 09:53





 11/09/17 07:55


Labs: 


 Abnormal Lab Results - Last 24 Hours (Table)











  11/09/17 11/09/17 11/09/17 Range/Units





  06:21 07:55 11:59 


 


Chloride   109 H   ()  mmol/L


 


BUN   22 H   (9-20)  mg/dL


 


Creatinine   1.34 H   (0.66-1.25)  mg/dL


 


Glucose   124 H   (74-99)  mg/dL


 


POC Glucose (mg/dL)  141 H   195 H  (75-99)  mg/dL














  11/09/17 11/09/17 Range/Units





  17:27 20:05 


 


Chloride    ()  mmol/L


 


BUN    (9-20)  mg/dL


 


Creatinine    (0.66-1.25)  mg/dL


 


Glucose    (74-99)  mg/dL


 


POC Glucose (mg/dL)  107 H  207 H  (75-99)  mg/dL














Assessment and Plan


(1) Obsessive compulsive disorder


Current Visit: Yes   Status: Chronic   Priority: High   Code(s): F42.9 - 

OBSESSIVE-COMPULSIVE DISORDER, UNSPECIFIED   SNOMED Code(s): 661287230


   





(2) Alcohol use disorder, severe, in sustained remission


Current Visit: Yes   Status: Chronic   Priority: Medium   Code(s): F10.21 - 

ALCOHOL DEPENDENCE, IN REMISSION   SNOMED Code(s): 14716054

## 2017-11-10 LAB
ANION GAP SERPL CALC-SCNC: 9 MMOL/L
BUN SERPL-SCNC: 20 MG/DL (ref 9–20)
CALCIUM SPEC-MCNC: 9.5 MG/DL (ref 8.4–10.2)
CHLORIDE SERPL-SCNC: 106 MMOL/L (ref 98–107)
CO2 SERPL-SCNC: 25 MMOL/L (ref 22–30)
GLUCOSE BLD-MCNC: 100 MG/DL (ref 75–99)
GLUCOSE BLD-MCNC: 118 MG/DL (ref 75–99)
GLUCOSE BLD-MCNC: 139 MG/DL (ref 75–99)
GLUCOSE BLD-MCNC: 190 MG/DL (ref 75–99)
GLUCOSE SERPL-MCNC: 244 MG/DL (ref 74–99)
NON-AFRICAN AMERICAN GFR(MDRD): 58
POTASSIUM SERPL-SCNC: 3.7 MMOL/L (ref 3.5–5.1)
SODIUM SERPL-SCNC: 140 MMOL/L (ref 137–145)

## 2017-11-10 RX ADMIN — PROPRANOLOL HYDROCHLORIDE SCH MG: 20 TABLET ORAL at 21:07

## 2017-11-10 RX ADMIN — CLOTRIMAZOLE SCH APPLIC: 0.01 CREAM TOPICAL at 21:05

## 2017-11-10 RX ADMIN — INSULIN ASPART SCH: 100 INJECTION, SOLUTION INTRAVENOUS; SUBCUTANEOUS at 17:41

## 2017-11-10 RX ADMIN — CLOTRIMAZOLE SCH APPLIC: 0.01 CREAM TOPICAL at 09:00

## 2017-11-10 RX ADMIN — LITHIUM CARBONATE SCH MG: 300 CAPSULE, GELATIN COATED ORAL at 21:06

## 2017-11-10 RX ADMIN — BENZTROPINE MESYLATE SCH MG: 0.5 TABLET ORAL at 21:06

## 2017-11-10 RX ADMIN — LORATADINE, PSEUDOEPHEDRINE SULFATE SCH EACH: 5; 120 TABLET, FILM COATED, EXTENDED RELEASE ORAL at 08:57

## 2017-11-10 RX ADMIN — LITHIUM CARBONATE SCH MG: 300 CAPSULE, GELATIN COATED ORAL at 08:58

## 2017-11-10 RX ADMIN — INSULIN ASPART SCH UNIT: 100 INJECTION, SOLUTION INTRAVENOUS; SUBCUTANEOUS at 20:15

## 2017-11-10 RX ADMIN — LORATADINE, PSEUDOEPHEDRINE SULFATE SCH EACH: 5; 120 TABLET, FILM COATED, EXTENDED RELEASE ORAL at 21:05

## 2017-11-10 RX ADMIN — PROPRANOLOL HYDROCHLORIDE SCH MG: 20 TABLET ORAL at 08:57

## 2017-11-10 RX ADMIN — INSULIN ASPART SCH: 100 INJECTION, SOLUTION INTRAVENOUS; SUBCUTANEOUS at 07:30

## 2017-11-10 RX ADMIN — BENZTROPINE MESYLATE SCH MG: 0.5 TABLET ORAL at 08:58

## 2017-11-10 RX ADMIN — INSULIN ASPART SCH UNIT: 100 INJECTION, SOLUTION INTRAVENOUS; SUBCUTANEOUS at 13:07

## 2017-11-10 RX ADMIN — LISINOPRIL SCH MG: 20 TABLET ORAL at 21:06

## 2017-11-10 NOTE — P.PN
Subjective


Principal diagnosis: 





OCD











 Abnormal Lab Results











  11/09/17 11/10/17 11/10/17





  20:05 06:07 08:53


 


Sodium    140


 


Potassium    3.7


 


Chloride    106


 


Carbon Dioxide    25


 


Anion Gap    9


 


BUN    20


 


Creatinine    1.30 H


 


Est GFR (MDRD) Af Amer    >60


 


Est GFR (MDRD) Non-Af    58


 


Glucose    244 H


 


POC Glucose (mg/dL)  207 H  118 H 


 


POC Glu Operater ID  Crystal Armendariz Kathryn 


 


Calcium    9.5











Interval History:





Patient observed socializing on the unit prior to interview jesting with a peer 

prior to interview. Patient reports today that despite improvements he still 

feels depressed, still has obsessional thoughts, and still has suicidal 

thoughts. Patient was reminded that he was recently laughing and joking with a 

peer - something he would have never done at time of admission so he most 

certainly is improving. Patient was encouraged to view his recovery in steps 

and not absolutes. Patient was reminded that he has always had some baseline 

depression, obsessional thoughts, and suicidal thoughts. Patient's BUN and CR 

both remain slightly elevated despite patient/reporting adequate hydration. 

Review of patient's medical record is also significant for uncontrolled HTN.





Mental Status Exam:





   Appearance:  alert, well groomed, appears stated age, steady gait 


   Behavior: no psychomotor agitation or psychomotor retardation,  no abnormal 

movements, fair eye contact


   Attitude: cooperative 


   Speech:  normal rate, rhythm,  fluency, articulation, volume, and prosody;  

primary language: English


   Mood: jocular


   Affect: bright, reactive, congruent


   Thought processes:  linear


   Thought content: patient does not appear to be responding to internal 

stimuli; patient denies auditory and visual hallucinations, no delusions 

appreciated, endorses 


      obsessions that are still disturbing but attenuate with social engagement


   Insight: fair


   Judgment: fair 


   Cognitive:  oriented to all 3 spheres, average intelligence





Plan:





   Continue hospitalization, patient continues to struggle with SI, no 

medications warranted today





   Continue current regimen





   Continue to  encourage fluids, CR is still high, consider Nephrology consult





   [facility] patient 

as patient responds well to any form of social engagement    





Objective





- Vital Signs


Vital signs: 


 Vital Signs











Temp  98.1 F   11/10/17 06:48


 


Pulse  70   11/10/17 09:48


 


Resp  20   11/10/17 09:48


 


BP  165/103   11/10/17 09:48


 


Pulse Ox  98   10/26/17 22:25








 Intake & Output











 11/09/17 11/10/17 11/10/17





 18:59 06:59 18:59


 


Weight 95.9 kg  














- Labs


CBC & Chem 7: 


 11/08/17 09:53





 11/10/17 08:53


Labs: 


 Abnormal Lab Results - Last 24 Hours (Table)











  11/09/17 11/09/17 11/09/17 Range/Units





  11:59 17:27 20:05 


 


POC Glucose (mg/dL)  195 H  107 H  207 H  (75-99)  mg/dL














  11/10/17 Range/Units





  06:07 


 


POC Glucose (mg/dL)  118 H  (75-99)  mg/dL














Assessment and Plan


(1) Obsessive compulsive disorder


Current Visit: Yes   Status: Chronic   Priority: High   Code(s): F42.9 - 

OBSESSIVE-COMPULSIVE DISORDER, UNSPECIFIED   SNOMED Code(s): 527435935


   





(2) Alcohol use disorder, severe, in sustained remission


Current Visit: Yes   Status: Chronic   Priority: Medium   Code(s): F10.21 - 

ALCOHOL DEPENDENCE, IN REMISSION   SNOMED Code(s): 84832694

## 2017-11-11 LAB
ANION GAP SERPL CALC-SCNC: 8 MMOL/L
BUN SERPL-SCNC: 21 MG/DL (ref 9–20)
CALCIUM SPEC-MCNC: 9.6 MG/DL (ref 8.4–10.2)
CHLORIDE SERPL-SCNC: 110 MMOL/L (ref 98–107)
CO2 SERPL-SCNC: 26 MMOL/L (ref 22–30)
GLUCOSE BLD-MCNC: 108 MG/DL (ref 75–99)
GLUCOSE BLD-MCNC: 126 MG/DL (ref 75–99)
GLUCOSE BLD-MCNC: 158 MG/DL (ref 75–99)
GLUCOSE BLD-MCNC: 207 MG/DL (ref 75–99)
GLUCOSE SERPL-MCNC: 143 MG/DL (ref 74–99)
NON-AFRICAN AMERICAN GFR(MDRD): 58
POTASSIUM SERPL-SCNC: 3.8 MMOL/L (ref 3.5–5.1)
SODIUM SERPL-SCNC: 144 MMOL/L (ref 137–145)

## 2017-11-11 RX ADMIN — PROPRANOLOL HYDROCHLORIDE SCH MG: 20 TABLET ORAL at 09:58

## 2017-11-11 RX ADMIN — LITHIUM CARBONATE SCH MG: 300 CAPSULE, GELATIN COATED ORAL at 21:27

## 2017-11-11 RX ADMIN — CLOTRIMAZOLE SCH APPLIC: 0.01 CREAM TOPICAL at 21:30

## 2017-11-11 RX ADMIN — INSULIN ASPART SCH UNIT: 100 INJECTION, SOLUTION INTRAVENOUS; SUBCUTANEOUS at 12:17

## 2017-11-11 RX ADMIN — INSULIN ASPART SCH UNIT: 100 INJECTION, SOLUTION INTRAVENOUS; SUBCUTANEOUS at 20:06

## 2017-11-11 RX ADMIN — LORATADINE, PSEUDOEPHEDRINE SULFATE SCH EACH: 5; 120 TABLET, FILM COATED, EXTENDED RELEASE ORAL at 10:00

## 2017-11-11 RX ADMIN — LORATADINE, PSEUDOEPHEDRINE SULFATE SCH EACH: 5; 120 TABLET, FILM COATED, EXTENDED RELEASE ORAL at 21:28

## 2017-11-11 RX ADMIN — INSULIN ASPART SCH: 100 INJECTION, SOLUTION INTRAVENOUS; SUBCUTANEOUS at 07:44

## 2017-11-11 RX ADMIN — BENZTROPINE MESYLATE SCH MG: 0.5 TABLET ORAL at 21:27

## 2017-11-11 RX ADMIN — PROPRANOLOL HYDROCHLORIDE SCH MG: 20 TABLET ORAL at 21:27

## 2017-11-11 RX ADMIN — INSULIN ASPART SCH: 100 INJECTION, SOLUTION INTRAVENOUS; SUBCUTANEOUS at 17:39

## 2017-11-11 RX ADMIN — LISINOPRIL SCH MG: 20 TABLET ORAL at 21:28

## 2017-11-11 RX ADMIN — LITHIUM CARBONATE SCH MG: 300 CAPSULE, GELATIN COATED ORAL at 10:00

## 2017-11-11 RX ADMIN — CLOTRIMAZOLE SCH APPLIC: 0.01 CREAM TOPICAL at 10:00

## 2017-11-11 RX ADMIN — BENZTROPINE MESYLATE SCH MG: 0.5 TABLET ORAL at 10:00

## 2017-11-11 NOTE — PN
PROGRESS NOTE



DATE OF SERVICE:

11/11/2017.



CHIEF COMPLAINT:

The patient was admitted due to depression with suicide thinking. He has a history of

OCD and seasonal affective disorder.  He has had compulsive behaviors.  He has had

increasing problems with thoughts that he should be dead.



INTERVAL HISTORY:

Patient has been doing fair.  He had a quiet evening last night.  He slept fairly well

today.  He has been up and about.  I note that Dr. Lund yesterday indicated that the

patient was still having problems with mood being down, some persistence of obsessional

thoughts and still having some suicide thoughts. On the other hand, the patient also

has had some periods where he seems to be able to socialize a little more where he will

interact and laugh some.  The patient confirmed that note saying that overall he feels

that he is gaining a little bit in his treatment.  He still says that his mood is down

and he has voices or thoughts in his head that sometimes are hard to get away from.  He

has been cooperative.  He has not had change in his general health.  He tolerates his

psychotropic medications.



MENTAL STATUS:

Patient gave fair eye contact.  Psychomotor activity was slowed.  Speech was monotone

and soft. He answered questions with brief responses.  His thoughts were clear.  His

affect blunted.  His mood was reserved.  He did appear to be significantly distressed.



ASSESSMENT:

I will continue the current diagnosis and treatment plan. We will continue to engage

the patient in individual and group therapeutic activities.  I will continue

psychotropic medications the same. Patient says that he has not had problems with the

medicines and given the complicated nature of his medicines at this point, I would

defer to Dr. Lund for any further adjustments.  We will continue to focus on

stabilization and discharge planning.





MMODL / IJN: [id number] / Job#: [id number]

## 2017-11-12 LAB
GLUCOSE BLD-MCNC: 104 MG/DL (ref 75–99)
GLUCOSE BLD-MCNC: 117 MG/DL (ref 75–99)
GLUCOSE BLD-MCNC: 151 MG/DL (ref 75–99)
GLUCOSE BLD-MCNC: 194 MG/DL (ref 75–99)

## 2017-11-12 RX ADMIN — BENZTROPINE MESYLATE SCH MG: 0.5 TABLET ORAL at 20:42

## 2017-11-12 RX ADMIN — PROPRANOLOL HYDROCHLORIDE SCH MG: 20 TABLET ORAL at 20:42

## 2017-11-12 RX ADMIN — INSULIN ASPART SCH: 100 INJECTION, SOLUTION INTRAVENOUS; SUBCUTANEOUS at 07:54

## 2017-11-12 RX ADMIN — INSULIN ASPART SCH UNIT: 100 INJECTION, SOLUTION INTRAVENOUS; SUBCUTANEOUS at 12:47

## 2017-11-12 RX ADMIN — BENZTROPINE MESYLATE SCH MG: 0.5 TABLET ORAL at 09:19

## 2017-11-12 RX ADMIN — INSULIN ASPART SCH UNIT: 100 INJECTION, SOLUTION INTRAVENOUS; SUBCUTANEOUS at 20:08

## 2017-11-12 RX ADMIN — CLOTRIMAZOLE SCH APPLIC: 0.01 CREAM TOPICAL at 12:23

## 2017-11-12 RX ADMIN — LISINOPRIL SCH MG: 20 TABLET ORAL at 20:43

## 2017-11-12 RX ADMIN — LITHIUM CARBONATE SCH MG: 300 CAPSULE, GELATIN COATED ORAL at 20:43

## 2017-11-12 RX ADMIN — LORATADINE, PSEUDOEPHEDRINE SULFATE SCH EACH: 5; 120 TABLET, FILM COATED, EXTENDED RELEASE ORAL at 20:43

## 2017-11-12 RX ADMIN — LITHIUM CARBONATE SCH MG: 300 CAPSULE, GELATIN COATED ORAL at 09:20

## 2017-11-12 RX ADMIN — LORATADINE, PSEUDOEPHEDRINE SULFATE SCH EACH: 5; 120 TABLET, FILM COATED, EXTENDED RELEASE ORAL at 09:19

## 2017-11-12 RX ADMIN — PROPRANOLOL HYDROCHLORIDE SCH MG: 20 TABLET ORAL at 09:19

## 2017-11-12 RX ADMIN — INSULIN ASPART SCH: 100 INJECTION, SOLUTION INTRAVENOUS; SUBCUTANEOUS at 17:30

## 2017-11-12 NOTE — PN
PROGRESS NOTE



DATE OF SERVICE:

11/12/2017.



CHIEF COMPLAINT:

The patient was admitted due to depression with suicidal thinking.  He has a history of

OCD and seasonal affect disorder.  He has had compulsive behaviors.  He has had

increasing problems with his thoughts that he should be dead.



INTERVAL HISTORY:

The patient has been doing fairly well.  He had a quiet evening last night.  He slept

well.  Today  he has been up and about.  Says that overall he seems to be doing

somewhat better.  He feels he is better today than he was yesterday and essentially

said the same thing yesterday.  Staff states that he still makes statements that he is

depressed, anxious, and has some suicide thoughts.  When I talked to him  he seemed to

have less of this.  He felt he feels that his medications have been helping and that he

is making progress.  He has a better outlook.  He has not had change in his general

health.  He tolerates his psychotropic medication.



MENTAL STATUS:

Patient gave fair eye contact.  He was a little restless.  His thoughts were clear.

His affect was somewhat constricted though not significantly so.  His mood was quiet.

He did not appear to be distressed.



ASSESSMENT:

I will continue the current diagnosis and treatment plan.  Continue psychotropic

medications the same.  The patient appears to be making progress.  He is in agreement

with the treatment plan and understands that the treatment plan laid out by Dr. Lund

is heading him in the right direction.  We will continue to focus on stabilization and

discharge planning.





MMPACOL / FLOWER: [id number] / Job#: [id number]

## 2017-11-13 LAB
ANION GAP SERPL CALC-SCNC: 8 MMOL/L
BUN SERPL-SCNC: 23 MG/DL (ref 9–20)
CALCIUM SPEC-MCNC: 9.9 MG/DL (ref 8.4–10.2)
CHLORIDE SERPL-SCNC: 106 MMOL/L (ref 98–107)
CO2 SERPL-SCNC: 29 MMOL/L (ref 22–30)
GLUCOSE BLD-MCNC: 116 MG/DL (ref 75–99)
GLUCOSE BLD-MCNC: 145 MG/DL (ref 75–99)
GLUCOSE BLD-MCNC: 229 MG/DL (ref 75–99)
GLUCOSE BLD-MCNC: 86 MG/DL (ref 75–99)
GLUCOSE SERPL-MCNC: 98 MG/DL (ref 74–99)
NON-AFRICAN AMERICAN GFR(MDRD): 51
POTASSIUM SERPL-SCNC: 3.8 MMOL/L (ref 3.5–5.1)
SODIUM SERPL-SCNC: 143 MMOL/L (ref 137–145)

## 2017-11-13 RX ADMIN — BENZTROPINE MESYLATE SCH MG: 0.5 TABLET ORAL at 08:56

## 2017-11-13 RX ADMIN — LITHIUM CARBONATE SCH MG: 300 CAPSULE, GELATIN COATED ORAL at 21:01

## 2017-11-13 RX ADMIN — LORATADINE, PSEUDOEPHEDRINE SULFATE SCH EACH: 5; 120 TABLET, FILM COATED, EXTENDED RELEASE ORAL at 21:01

## 2017-11-13 RX ADMIN — PROPRANOLOL HYDROCHLORIDE SCH MG: 20 TABLET ORAL at 21:01

## 2017-11-13 RX ADMIN — INSULIN ASPART SCH UNIT: 100 INJECTION, SOLUTION INTRAVENOUS; SUBCUTANEOUS at 20:07

## 2017-11-13 RX ADMIN — LORATADINE, PSEUDOEPHEDRINE SULFATE SCH EACH: 5; 120 TABLET, FILM COATED, EXTENDED RELEASE ORAL at 08:56

## 2017-11-13 RX ADMIN — INSULIN ASPART SCH: 100 INJECTION, SOLUTION INTRAVENOUS; SUBCUTANEOUS at 17:17

## 2017-11-13 RX ADMIN — ACETAMINOPHEN PRN ML: 160 SOLUTION ORAL at 09:44

## 2017-11-13 RX ADMIN — INSULIN ASPART SCH: 100 INJECTION, SOLUTION INTRAVENOUS; SUBCUTANEOUS at 08:14

## 2017-11-13 RX ADMIN — LISINOPRIL SCH MG: 20 TABLET ORAL at 21:01

## 2017-11-13 RX ADMIN — PROPRANOLOL HYDROCHLORIDE SCH MG: 20 TABLET ORAL at 08:56

## 2017-11-13 RX ADMIN — BENZTROPINE MESYLATE SCH MG: 0.5 TABLET ORAL at 21:00

## 2017-11-13 RX ADMIN — INSULIN ASPART SCH UNIT: 100 INJECTION, SOLUTION INTRAVENOUS; SUBCUTANEOUS at 12:59

## 2017-11-13 RX ADMIN — LITHIUM CARBONATE SCH MG: 300 CAPSULE, GELATIN COATED ORAL at 08:56

## 2017-11-13 NOTE — P.PN
Subjective


Progress Note Date: 11/13/17


Principal diagnosis: 





OCD





Interval History:





Patient is 52 year old  male with OCD and Schizoaffective Disorder who 

has historically taken a combination of multiple medications to stabilize. At 

present, he has been doing well with Luvox, Clozaril, Lithium, Lamictal, Buspar

, and Inderal. However, patient's CR has been slowly creeping up with no 

response to hydration. Review of patient's medical record is also significant 

for HTN that has thus far not been reported. Given patient is already on two 

antihypertensive, Nephrology has been consulted for recommendations on 

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 

if true will likely warrant discontinuation of Lithium, but I would  

verification because patient's mental state will likely suffer without Lithium 

therapy.





Mental Status Exam:





   Appearance:  alert, well groomed, appears stated age, steady gait 


   Behavior: no psychomotor agitation or psychomotor retardation, no abnormal 

movements, fair eye contact


   Attitude: cooperative 


   Speech:  normal rate, rhythm, fluency, articulation, volume, and prosody; 

primary language: English


   Mood: jocular


   Affect: bright, reactive, congruent


   Thought processes:  linear


   Thought content: patient does not appear to be responding to internal 

stimuli; patient denies auditory and visual hallucinations, no delusions 

appreciated, endorses obsessions that are still disturbing but attenuate with 

social 


      engagement


   Insight: fair


   Judgment: fair 


   Cognitive:  oriented to all 3 spheres, average intelligence





Plan:





   Continue hospitalization, patient continues to struggle with SI, no 

medications warranted today





   Continue current regimen





   BMP now and then BMP and Lithium level QAM





   Continue to encourage fluids, CR is still high, Nephrology consulted, 

patient has historically done poorly without Lithium but it may need to be 

discontinued 





   Patient requested discharge to [facility] today; SW informed and will 

request a bed





   SW contact [facility] to arrange OP community based resources as patient as 

patient responds well to any form of social engagement   





Objective





- Vital Signs


Vital signs: 


 Vital Signs











Temp  97.7 F   11/13/17 07:02


 


Pulse  75   11/13/17 08:55


 


Resp  16   11/13/17 08:55


 


BP  140/94   11/13/17 08:55


 


Pulse Ox  98   10/26/17 22:25








 Intake & Output











 11/12/17 11/13/17 11/13/17





 18:59 06:59 18:59


 


Weight 97.721 kg  














- Labs


CBC & Chem 7: 


 11/08/17 09:53





 11/13/17 14:51


Labs: 


 Abnormal Lab Results - Last 24 Hours (Table)











  11/12/17 11/12/17 11/13/17 Range/Units





  17:26 20:06 06:29 


 


POC Glucose (mg/dL)  104 H  194 H  116 H  (75-99)  mg/dL














  11/13/17 Range/Units





  12:51 


 


POC Glucose (mg/dL)  145 H  (75-99)  mg/dL














Assessment and Plan


(1) Obsessive compulsive disorder


Current Visit: Yes   Status: Chronic   Priority: High   Code(s): F42.9 - 

OBSESSIVE-COMPULSIVE DISORDER, UNSPECIFIED   SNOMED Code(s): 341377610


   





(2) Alcohol use disorder, severe, in sustained remission


Current Visit: Yes   Status: Chronic   Priority: Medium   Code(s): F10.21 - 

ALCOHOL DEPENDENCE, IN REMISSION   SNOMED Code(s): 51612911

## 2017-11-14 LAB
ANION GAP SERPL CALC-SCNC: 8 MMOL/L
BUN SERPL-SCNC: 23 MG/DL (ref 9–20)
CALCIUM SPEC-MCNC: 10 MG/DL (ref 8.4–10.2)
CHLORIDE SERPL-SCNC: 107 MMOL/L (ref 98–107)
CO2 SERPL-SCNC: 31 MMOL/L (ref 22–30)
GLUCOSE BLD-MCNC: 108 MG/DL (ref 75–99)
GLUCOSE BLD-MCNC: 125 MG/DL (ref 75–99)
GLUCOSE BLD-MCNC: 141 MG/DL (ref 75–99)
GLUCOSE BLD-MCNC: 184 MG/DL (ref 75–99)
GLUCOSE SERPL-MCNC: 133 MG/DL (ref 74–99)
LITHIUM SERPL-MCNC: 0.5 MMOL/L
NON-AFRICAN AMERICAN GFR(MDRD): 54
POTASSIUM SERPL-SCNC: 3.9 MMOL/L (ref 3.5–5.1)
SODIUM SERPL-SCNC: 146 MMOL/L (ref 137–145)

## 2017-11-14 RX ADMIN — LORATADINE, PSEUDOEPHEDRINE SULFATE SCH EACH: 5; 120 TABLET, FILM COATED, EXTENDED RELEASE ORAL at 08:23

## 2017-11-14 RX ADMIN — LITHIUM CARBONATE SCH MG: 300 CAPSULE, GELATIN COATED ORAL at 21:44

## 2017-11-14 RX ADMIN — INSULIN ASPART SCH UNIT: 100 INJECTION, SOLUTION INTRAVENOUS; SUBCUTANEOUS at 13:08

## 2017-11-14 RX ADMIN — INSULIN ASPART SCH: 100 INJECTION, SOLUTION INTRAVENOUS; SUBCUTANEOUS at 17:55

## 2017-11-14 RX ADMIN — BENZTROPINE MESYLATE SCH MG: 0.5 TABLET ORAL at 21:43

## 2017-11-14 RX ADMIN — LISINOPRIL SCH MG: 20 TABLET ORAL at 21:44

## 2017-11-14 RX ADMIN — PROPRANOLOL HYDROCHLORIDE SCH MG: 20 TABLET ORAL at 21:44

## 2017-11-14 RX ADMIN — LITHIUM CARBONATE SCH MG: 300 CAPSULE, GELATIN COATED ORAL at 08:23

## 2017-11-14 RX ADMIN — INSULIN ASPART SCH: 100 INJECTION, SOLUTION INTRAVENOUS; SUBCUTANEOUS at 08:13

## 2017-11-14 RX ADMIN — PROPRANOLOL HYDROCHLORIDE SCH MG: 20 TABLET ORAL at 08:24

## 2017-11-14 RX ADMIN — LORATADINE, PSEUDOEPHEDRINE SULFATE SCH EACH: 5; 120 TABLET, FILM COATED, EXTENDED RELEASE ORAL at 21:44

## 2017-11-14 RX ADMIN — ACETAMINOPHEN PRN ML: 160 SOLUTION ORAL at 01:35

## 2017-11-14 RX ADMIN — BENZTROPINE MESYLATE SCH MG: 0.5 TABLET ORAL at 08:23

## 2017-11-14 RX ADMIN — INSULIN ASPART SCH UNIT: 100 INJECTION, SOLUTION INTRAVENOUS; SUBCUTANEOUS at 20:21

## 2017-11-14 NOTE — P.PN
Subjective


Principal diagnosis: 





OCD





Interval History:





Patient is 52 year old  male with OCD and Schizoaffective Disorder who 

has historically taken a combination of multiple medications to stabilize. At 

present, he has been doing well with Luvox, Clozaril, Lithium, Lamictal, Buspar

, and Inderal. Patient was evaluated by Nephrology today. Patient remains in a 

bright, upbeat mood. He continues to socialize well in groups. He still will 

endorse SI when asked, but his responses now are much less intense than 

initially.Discussed with patient today plan of increasing OP therapy with Maria Narayanan to 2x per week and adding Peer Support instead of going to to [facility], however patient is adamant that he would do better if he went to [facility]. He states he has been there in the past and he feels the environment 

will help him further back to home so he does not relapse.





Mental Status Exam:





   Appearance:  alert, well groomed, appears stated age, steady gait 


   Behavior: no psychomotor agitation or psychomotor retardation, no abnormal 

movements, fair eye contact


   Attitude: cooperative 


   Speech:  normal rate, rhythm, fluency, articulation, volume, and prosody; 

primary language: English


   Mood: jocular


   Affect: bright, reactive, congruent


   Thought processes:  linear


   Thought content: patient does not appear to be responding to internal 

stimuli; patient denies auditory and visual hallucinations, no delusions 

appreciated, endorses 


      obsessions that are still disturbing but attenuate with social 


      engagement


   Insight: fair


   Judgment: fair 


   Cognitive:  oriented to all 3 spheres, average intelligence





Plan:





   Continue hospitalization, patient continues to struggle with SI, no 

medications warranted today





   Continue current regimen





   BMP/Lithium level reviewed, CR remains elevated





   Continue to encourage fluids, CR is still high, Nephrology saw patient today

, recommendations appreciated; if patient's renal failure can be somehow 


      managed vs if Lithium needs to be stopped.





   Patient requested discharge to [facility] today; SW informed and will 

request a bed





   SW contact [facility] to arrange OP community based resources as patient as 

patient responds well to any form of social engagement   





Objective





- Vital Signs


Vital signs: 


 Vital Signs











Temp  98.4 F   11/14/17 21:48


 


Pulse  83   11/14/17 21:48


 


Resp  18   11/14/17 21:48


 


BP  138/98   11/14/17 21:48


 


Pulse Ox  95   11/14/17 21:48








 Intake & Output











 11/14/17 11/14/17 11/15/17





 06:59 18:59 06:59


 


Output Total  525 


 


Balance  -525 


 


Output:   


 


  Urine  525 














- Labs


CBC & Chem 7: 


 11/08/17 09:53





 11/14/17 08:25


Labs: 


 Abnormal Lab Results - Last 24 Hours (Table)











  11/14/17 11/14/17 11/14/17 Range/Units





  06:48 08:25 12:39 


 


Sodium   146 H   (137-145)  mmol/L


 


Carbon Dioxide   31 H   (22-30)  mmol/L


 


BUN   23 H   (9-20)  mg/dL


 


Creatinine   1.40 H   (0.66-1.25)  mg/dL


 


Glucose   133 H   (74-99)  mg/dL


 


POC Glucose (mg/dL)  108 H   141 H  (75-99)  mg/dL














  11/14/17 11/14/17 Range/Units





  17:50 20:19 


 


Sodium    (137-145)  mmol/L


 


Carbon Dioxide    (22-30)  mmol/L


 


BUN    (9-20)  mg/dL


 


Creatinine    (0.66-1.25)  mg/dL


 


Glucose    (74-99)  mg/dL


 


POC Glucose (mg/dL)  125 H  184 H  (75-99)  mg/dL














Assessment and Plan


(1) Obsessive compulsive disorder


Current Visit: Yes   Status: Chronic   Priority: High   Code(s): F42.9 - 

OBSESSIVE-COMPULSIVE DISORDER, UNSPECIFIED   SNOMED Code(s): 666836187


   





(2) Alcohol use disorder, severe, in sustained remission


Current Visit: Yes   Status: Chronic   Priority: Medium   Code(s): F10.21 - 

ALCOHOL DEPENDENCE, IN REMISSION   SNOMED Code(s): 89327413

## 2017-11-14 NOTE — CONS
CONSULTATION



Patient is seen for evaluation for renal failure and possible diabetes insipidus

secondary to lithium.



HISTORY OF PRESENT ILLNESS:

Patient is a 52-year-old male with history of schizoaffective/obsessive-compulsive

disorder who is currently admitted in inpatient rehab and he is currently maintained on

lithium.  Patient states he had been on lithium previously for many years and then he

was off of it and was recently started again about 1-1/2 years ago.  His serum

creatinine has been at about 1.3-1.4 mg/dL.  Previous creatinine in October was 1.2,

and we have also serum creatinine of 1.17-1.2 in January of 2016.



Patient denies excessive intake of water.  He states he has always had good fluid

intake and has not noticed any increase recently in the past few months.  The serum

sodium has been at 140 to 146 mEq/L.  Urine output is not charted.



PAST MEDICAL HISTORY:

Significant for hypertension, bipolar disorder, type 2 diabetes, gastroesophageal

reflux disease, hyperlipidemia, obstructive sleep apnea, maintained on CPAP.



PAST SURGICAL HISTORY:

Hernia repair.



SOCIAL HISTORY:

Negative for smoking, drug abuse or alcohol abuse.



REVIEW OF SYSTEMS:

As per HPI.  Other systems negative.



MEDICATIONS:

Medications at home include Zestril, Inderal, BuSpar, lithium, Clozaril, Lamictal.



ALLERGIES:

INCLUDE PENICILLIN AND RISPERDAL.



EXAMINATION:

Patient is currently comfortable, awake, alert, oriented x3, not in any acute distress.

Blood pressure is 158/102, heart rate 78 per minute.  He is afebrile.  Examination of

the heart S1, S2.  Examination lungs bilateral breath sounds are heard.  Abdomen is

soft, nontender.  Examination lower extremity shows no evidence of edema.  CNS exam is

grossly intact.



LAB:

Show sodium 146, potassium 3.9, BUN 23, serum creatinine 1.4.  Calcium was 10.0.

Lithium level 0.5.  The UA shows trace glucose, no blood, no protein or cells.



ASSESSMENT:

1. Chronic kidney disease, most likely secondary to nephrosclerosis with perhaps an

    element of prerenal azotemia.  The patient is encouraged to maintain good oral

    intake.  I will check a 24 hour urine output to see if the patient has polyuria.

    His sodium is slightly towards the higher side and at this time he is encouraged to

    increase his free water to maintain good free water intake.

2. Schizoaffective disorder, maintained on lithium.

3. Hypertension currently uncontrolled.  Will add low-dose calcium channel blockers.

4. Type 2 diabetes, maintained on insulin.



PLAN:

Add Norvasc. Measure 24 hour urine output and maintain adequate fluid intake and repeat

labs in 1-2 days.  Continue with the Zestril at the current dose for now.



Thank you for this consultation.  We will continue to follow the patient with you

during his hospitalization.





MMODL / IJN: [id number] / Job#: [id number]

## 2017-11-15 LAB
BASOPHILS # BLD AUTO: 0.1 K/UL (ref 0–0.2)
BASOPHILS NFR BLD AUTO: 1 %
CH: 29.9
CHCM: 34.2
EOSINOPHIL # BLD AUTO: 0.2 K/UL (ref 0–0.7)
EOSINOPHIL NFR BLD AUTO: 2 %
ERYTHROCYTE [DISTWIDTH] IN BLOOD BY AUTOMATED COUNT: 5.05 M/UL (ref 4.3–5.9)
ERYTHROCYTE [DISTWIDTH] IN BLOOD: 14.1 % (ref 11.5–15.5)
GLUCOSE BLD-MCNC: 117 MG/DL (ref 75–99)
GLUCOSE BLD-MCNC: 133 MG/DL (ref 75–99)
GLUCOSE BLD-MCNC: 232 MG/DL (ref 75–99)
GLUCOSE BLD-MCNC: 97 MG/DL (ref 75–99)
HCT VFR BLD AUTO: 44.3 % (ref 39–53)
HDW: 2.85
HGB BLD-MCNC: 15.1 GM/DL (ref 13–17.5)
LUC NFR BLD AUTO: 1 %
LYMPHOCYTES # SPEC AUTO: 2.4 K/UL (ref 1–4.8)
LYMPHOCYTES NFR SPEC AUTO: 22 %
MCH RBC QN AUTO: 29.8 PG (ref 25–35)
MCHC RBC AUTO-ENTMCNC: 34 G/DL (ref 31–37)
MCV RBC AUTO: 87.8 FL (ref 80–100)
MONOCYTES # BLD AUTO: 0.6 K/UL (ref 0–1)
MONOCYTES NFR BLD AUTO: 5 %
NEUTROPHILS # BLD AUTO: 7.7 K/UL (ref 1.3–7.7)
NEUTROPHILS NFR BLD AUTO: 69 %
WBC # BLD AUTO: 0.09 10*3/UL
WBC # BLD AUTO: 11.1 K/UL (ref 3.8–10.6)
WBC (PEROX): 11.17

## 2017-11-15 RX ADMIN — PROPRANOLOL HYDROCHLORIDE SCH MG: 20 TABLET ORAL at 09:14

## 2017-11-15 RX ADMIN — BENZTROPINE MESYLATE SCH MG: 0.5 TABLET ORAL at 21:01

## 2017-11-15 RX ADMIN — LITHIUM CARBONATE SCH MG: 300 CAPSULE, GELATIN COATED ORAL at 21:00

## 2017-11-15 RX ADMIN — ACETAMINOPHEN PRN MG: 325 TABLET, FILM COATED ORAL at 18:51

## 2017-11-15 RX ADMIN — BENZTROPINE MESYLATE SCH MG: 0.5 TABLET ORAL at 09:13

## 2017-11-15 RX ADMIN — LITHIUM CARBONATE SCH MG: 300 CAPSULE, GELATIN COATED ORAL at 09:14

## 2017-11-15 RX ADMIN — INSULIN ASPART SCH: 100 INJECTION, SOLUTION INTRAVENOUS; SUBCUTANEOUS at 07:44

## 2017-11-15 RX ADMIN — LORATADINE, PSEUDOEPHEDRINE SULFATE SCH EACH: 5; 120 TABLET, FILM COATED, EXTENDED RELEASE ORAL at 20:59

## 2017-11-15 RX ADMIN — INSULIN ASPART SCH UNIT: 100 INJECTION, SOLUTION INTRAVENOUS; SUBCUTANEOUS at 21:02

## 2017-11-15 RX ADMIN — INSULIN ASPART SCH UNIT: 100 INJECTION, SOLUTION INTRAVENOUS; SUBCUTANEOUS at 12:38

## 2017-11-15 RX ADMIN — INSULIN ASPART SCH: 100 INJECTION, SOLUTION INTRAVENOUS; SUBCUTANEOUS at 21:02

## 2017-11-15 RX ADMIN — LORATADINE, PSEUDOEPHEDRINE SULFATE SCH EACH: 5; 120 TABLET, FILM COATED, EXTENDED RELEASE ORAL at 09:13

## 2017-11-15 RX ADMIN — PROPRANOLOL HYDROCHLORIDE SCH MG: 20 TABLET ORAL at 20:59

## 2017-11-15 RX ADMIN — LISINOPRIL SCH MG: 20 TABLET ORAL at 21:00

## 2017-11-15 NOTE — P.PN
Subjective


Progress Note Date: 11/15/17


Principal diagnosis: 





OCD





Interval History:





Patient is 52 year old  male with OCD and Schizoaffective Disorder who 

has historically taken a combination of multiple medications to stabilize. At 

present, he has been doing well with Luvox, Clozaril, Lithium, Lamictal, Buspar

, and Inderal. Patient was evaluated by Nephrology yesterday and provided a 24-

hour urine collection. Patient continues to request discharge to [facility]. 

Reviewed with treatment team, [facility] it seems would prefer patient go home with 

increased access to OP therapy and , however a final 

decision by [facility] has not yet been made. Patient is overall improved, attending 

groups, eating well, sleeping well, compliant with medications. He has not 

required any emergency medication.





Mental Status Exam:





   Appearance:  alert, well groomed, appears stated age, steady gait 


   Behavior: no psychomotor agitation or psychomotor retardation, no abnormal 

movements, fair eye contact


   Attitude: cooperative 


   Speech:  normal rate, rhythm, fluency, articulation, volume, and prosody; 

primary language: English


   Mood: jocular


   Affect: bright, reactive, congruent


   Thought processes:  linear


   Thought content: patient does not appear to be responding to internal 

stimuli; patient denies auditory and visual hallucinations, no delusions 

appreciated, endorses 


      obsessions but they are mostly resolved


      engagement


   Insight: fair


   Judgment: fair 


   Cognitive:  oriented to all 3 spheres, average intelligence





Plan:





   Continue hospitalization, patient continues to struggle with SI, no 

medications warranted today





   Continue current regimen





   BMP/Lithium level reviewed, CR remains elevated, repeat CMP and HGA1C 

tomorrow





   Continue to encourage fluids, CR is still high, Nephrology saw patient today

, recommendations appreciated; if patient's renal failure can be somehow 


      managed vs if Lithium needs to be stopped.





   Patient requested discharge to [facility] today; SW inormed and decision 

will be deferred to [facility]





   SW contact [facility] to arrange OP community based resources as patient as 

patient responds well to any form of social engagement   





Objective





- Vital Signs


Vital signs: 


 Vital Signs











Temp  97.9 F   11/15/17 06:48


 


Pulse  75   11/15/17 09:13


 


Resp  16   11/15/17 09:13


 


BP  130/90   11/15/17 09:13


 


Pulse Ox  95   11/14/17 21:48








 Intake & Output











 11/15/17 11/15/17 11/16/17





 06:59 18:59 06:59


 


Output Total 1150  


 


Balance -1150  


 


Output:   


 


  Urine 1150  














- Labs


CBC & Chem 7: 


 11/15/17 08:36





 11/14/17 08:25


Labs: 


 Abnormal Lab Results - Last 24 Hours (Table)











  11/15/17 11/15/17 11/15/17 Range/Units





  06:41 08:36 12:24 


 


WBC   11.1 H   (3.8-10.6)  k/uL


 


POC Glucose (mg/dL)  117 H   133 H  (75-99)  mg/dL














  11/15/17 Range/Units





  20:33 


 


WBC   (3.8-10.6)  k/uL


 


POC Glucose (mg/dL)  232 H  (75-99)  mg/dL














Assessment and Plan


(1) Obsessive compulsive disorder


Current Visit: Yes   Status: Chronic   Priority: High   Code(s): F42.9 - 

OBSESSIVE-COMPULSIVE DISORDER, UNSPECIFIED   SNOMED Code(s): 477570683


   





(2) Alcohol use disorder, severe, in sustained remission


Current Visit: Yes   Status: Chronic   Priority: Medium   Code(s): F10.21 - 

ALCOHOL DEPENDENCE, IN REMISSION   SNOMED Code(s): 48332094

## 2017-11-16 LAB
ALP SERPL-CCNC: 68 U/L (ref 38–126)
ALT SERPL-CCNC: 42 U/L (ref 21–72)
ANION GAP SERPL CALC-SCNC: 8 MMOL/L
AST SERPL-CCNC: 21 U/L (ref 17–59)
BUN SERPL-SCNC: 20 MG/DL (ref 9–20)
CALCIUM SPEC-MCNC: 9.3 MG/DL (ref 8.4–10.2)
CHLORIDE SERPL-SCNC: 108 MMOL/L (ref 98–107)
CO2 SERPL-SCNC: 28 MMOL/L (ref 22–30)
GLUCOSE BLD-MCNC: 108 MG/DL (ref 75–99)
GLUCOSE BLD-MCNC: 116 MG/DL (ref 75–99)
GLUCOSE BLD-MCNC: 131 MG/DL (ref 75–99)
GLUCOSE BLD-MCNC: 177 MG/DL (ref 75–99)
GLUCOSE SERPL-MCNC: 119 MG/DL (ref 74–99)
NON-AFRICAN AMERICAN GFR(MDRD): 58
POTASSIUM SERPL-SCNC: 3.8 MMOL/L (ref 3.5–5.1)
PROT SERPL-MCNC: 6.4 G/DL (ref 6.3–8.2)
SODIUM SERPL-SCNC: 144 MMOL/L (ref 137–145)

## 2017-11-16 RX ADMIN — BENZTROPINE MESYLATE SCH MG: 0.5 TABLET ORAL at 09:51

## 2017-11-16 RX ADMIN — LITHIUM CARBONATE SCH MG: 300 CAPSULE, GELATIN COATED ORAL at 21:12

## 2017-11-16 RX ADMIN — BENZTROPINE MESYLATE SCH MG: 0.5 TABLET ORAL at 21:10

## 2017-11-16 RX ADMIN — LITHIUM CARBONATE SCH MG: 300 CAPSULE, GELATIN COATED ORAL at 09:51

## 2017-11-16 RX ADMIN — LORATADINE, PSEUDOEPHEDRINE SULFATE SCH EACH: 5; 120 TABLET, FILM COATED, EXTENDED RELEASE ORAL at 09:52

## 2017-11-16 RX ADMIN — INSULIN ASPART SCH UNIT: 100 INJECTION, SOLUTION INTRAVENOUS; SUBCUTANEOUS at 13:02

## 2017-11-16 RX ADMIN — INSULIN ASPART SCH: 100 INJECTION, SOLUTION INTRAVENOUS; SUBCUTANEOUS at 17:26

## 2017-11-16 RX ADMIN — PROPRANOLOL HYDROCHLORIDE SCH MG: 20 TABLET ORAL at 21:18

## 2017-11-16 RX ADMIN — PROPRANOLOL HYDROCHLORIDE SCH MG: 20 TABLET ORAL at 09:53

## 2017-11-16 RX ADMIN — LORATADINE, PSEUDOEPHEDRINE SULFATE SCH EACH: 5; 120 TABLET, FILM COATED, EXTENDED RELEASE ORAL at 21:10

## 2017-11-16 RX ADMIN — ACETAMINOPHEN PRN ML: 160 SOLUTION ORAL at 23:37

## 2017-11-16 RX ADMIN — INSULIN ASPART SCH UNIT: 100 INJECTION, SOLUTION INTRAVENOUS; SUBCUTANEOUS at 21:16

## 2017-11-16 RX ADMIN — LISINOPRIL SCH MG: 20 TABLET ORAL at 21:09

## 2017-11-16 RX ADMIN — INSULIN ASPART SCH: 100 INJECTION, SOLUTION INTRAVENOUS; SUBCUTANEOUS at 07:41

## 2017-11-16 NOTE — P.PN
Subjective


Progress Note Date: 11/16/17


Principal diagnosis: 





OCD





Interval History:





Patient is 52 year old  male with OCD and Schizoaffective Disorder who 

has historically taken a combination of multiple medications to stabilize. At 

present, he has been doing well with Luvox, Clozaril, Lithium, Lamictal, Buspar

, and Inderal. Patient was evaluated by Nephrology consulted  helping address 

elevated CR and HTN. [facility] met with patient discussed with him that they did not 

feel [facility] was the best option. Rather, patient will discharge home 

with home services to include twice weekly in home therapy with his [facility] 

therapist and a  visiting throughout the week.  Patient 

remains overall improved, attending groups, eating well, sleeping well, 

compliant with medications. He has not required any emergency medication.





Mental Status Exam:





   Appearance:  alert, well groomed, appears stated age, steady gait 


   Behavior: no psychomotor agitation or psychomotor retardation, no abnormal 

movements, fair eye contact


   Attitude: cooperative 


   Speech:  normal rate, rhythm, fluency, articulation, volume, and prosody; 

primary language: English


   Mood: jocular


   Affect: bright, reactive, congruent


   Thought processes:  linear


   Thought content: patient does not appear to be responding to internal 

stimuli; patient denies auditory and visual hallucinations, no delusions 

appreciated, endorses 


      obsessions but they are mostly resolved


      engagement


   Insight: fair


   Judgment: fair 


   Cognitive:  oriented to all 3 spheres, average intelligence





Plan:





   Continue hospitalization





   Continue current regimen





   BMP/Lithium level reviewed, CR remains elevated,HGA1C ordered and pending





   Continue to encourage fluids, CR is still high, Decrease Lithium to 300-mg 

PO BID





   Provisional discharge home on 11/20/2017. [facility] has arranged for in home 

therapy x 2 week +  throughout the week





   SW contact [facility] to arrange OP community based resources as patient as 

patient responds well to any form of social engagement   





Objective





- Vital Signs


Vital signs: 


 Vital Signs











Temp  98.3 F   11/16/17 07:00


 


Pulse  68   11/16/17 13:32


 


Resp  16   11/16/17 13:32


 


BP  129/89   11/16/17 13:32


 


Pulse Ox  95   11/14/17 21:48














- Labs


CBC & Chem 7: 


 11/15/17 08:36





 11/16/17 07:58


Labs: 


 Abnormal Lab Results - Last 24 Hours (Table)











  11/16/17 11/16/17 11/16/17 Range/Units





  06:20 07:58 12:34 


 


Chloride   108 H   ()  mmol/L


 


Creatinine   1.30 H   (0.66-1.25)  mg/dL


 


Glucose   119 H   (74-99)  mg/dL


 


POC Glucose (mg/dL)  108 H   131 H  (75-99)  mg/dL














  11/16/17 11/16/17 Range/Units





  17:23 20:29 


 


Chloride    ()  mmol/L


 


Creatinine    (0.66-1.25)  mg/dL


 


Glucose    (74-99)  mg/dL


 


POC Glucose (mg/dL)  116 H  177 H  (75-99)  mg/dL














Assessment and Plan


(1) Obsessive compulsive disorder


Current Visit: Yes   Status: Chronic   Priority: High   Code(s): F42.9 - 

OBSESSIVE-COMPULSIVE DISORDER, UNSPECIFIED   SNOMED Code(s): 327486725


   





(2) Alcohol use disorder, severe, in sustained remission


Current Visit: Yes   Status: Chronic   Priority: Medium   Code(s): F10.21 - 

ALCOHOL DEPENDENCE, IN REMISSION   SNOMED Code(s): 76879735

## 2017-11-16 NOTE — P.PN
Subjective





Patient is seen in follow-up for possible diabetes insipidus and chronic kidney 

disease.  Patient's currently admitted in the inpatient psychiatry unit for 

schizoaffective disorder.  He is resting in bed.  Denies chest pain or 

shortness of breath.  Admits to good urine output.  Oral intake is good.  He 

does have chronic kidney disease stage III with baseline creatinine in the 

range of 1.2-1.3.  He is maintained on lithium.  He denies excessive water 

intake.  His lithium level has been in the normal range.  His 24-hour urine 

collection was not suggestive of polyuria.





Vital signs are stable.


General: The patient appeared well nourished and normally developed. 


HEENT: Head exam is unremarkable. Neck is without jugular venous distension.


LUNGS: Lungs are clear to auscultation and percussion. Breath sounds decreased.


HEART: Rate and Rhythm are regular. First and second heart sounds normal. No 

murmurs, rubs or gallops. 


ABDOMEN: Abdominal exam reveals normal bowel sounds. Non-tender and non-

distended. No evidence of peritonitis.


EXTREMITITES: No clubbing, cyanosis, or edema.





Objective





- Vital Signs


Vital signs: 


 Vital Signs











Temp  98.3 F   11/16/17 07:00


 


Pulse  61   11/16/17 07:00


 


Resp  16   11/16/17 07:00


 


BP  158/93   11/16/17 07:00


 


Pulse Ox  95   11/14/17 21:48














- Labs


CBC & Chem 7: 


 11/15/17 08:36





 11/16/17 07:58


Labs: 


 Abnormal Lab Results - Last 24 Hours (Table)











  11/15/17 11/15/17 11/16/17 Range/Units





  12:24 20:33 06:20 


 


Chloride     ()  mmol/L


 


Creatinine     (0.66-1.25)  mg/dL


 


Glucose     (74-99)  mg/dL


 


POC Glucose (mg/dL)  133 H  232 H  108 H  (75-99)  mg/dL














  11/16/17 Range/Units





  07:58 


 


Chloride  108 H  ()  mmol/L


 


Creatinine  1.30 H  (0.66-1.25)  mg/dL


 


Glucose  119 H  (74-99)  mg/dL


 


POC Glucose (mg/dL)   (75-99)  mg/dL














Assessment and Plan


Plan: 





Assessment:


#1.  Chronic kidney disease stage III likely secondary to nephrosclerosis.  

Creatinine is little better at 1.3 today.  Urinalysis benign.


#2.  Schizoaffective disorder maintained on lithium.  Lithium levels not toxic.


#3.  Type 2 diabetes mellitus.


#4.  Mild hypernatremia which improved with oral water intake.


#5.  Hypertension with chronic kidney disease.  Controlled.





Plan:


Avoid nephrotoxic agents and hypotensive episodes.


Encouraged adequate fluid intake.


Monitor electrolytes periodically.


24-hour urine collection not suggestive of polyuria.

## 2017-11-17 LAB
ANION GAP SERPL CALC-SCNC: 10 MMOL/L
BUN SERPL-SCNC: 22 MG/DL (ref 9–20)
CALCIUM SPEC-MCNC: 9.6 MG/DL (ref 8.4–10.2)
CHLORIDE SERPL-SCNC: 107 MMOL/L (ref 98–107)
CO2 SERPL-SCNC: 25 MMOL/L (ref 22–30)
GLUCOSE BLD-MCNC: 108 MG/DL (ref 75–99)
GLUCOSE BLD-MCNC: 127 MG/DL (ref 75–99)
GLUCOSE BLD-MCNC: 129 MG/DL (ref 75–99)
GLUCOSE BLD-MCNC: 203 MG/DL (ref 75–99)
GLUCOSE SERPL-MCNC: 167 MG/DL (ref 74–99)
NON-AFRICAN AMERICAN GFR(MDRD): 58
POTASSIUM SERPL-SCNC: 4 MMOL/L (ref 3.5–5.1)
SODIUM SERPL-SCNC: 142 MMOL/L (ref 137–145)

## 2017-11-17 RX ADMIN — LITHIUM CARBONATE SCH MG: 300 CAPSULE, GELATIN COATED ORAL at 09:15

## 2017-11-17 RX ADMIN — LITHIUM CARBONATE SCH MG: 300 CAPSULE, GELATIN COATED ORAL at 21:35

## 2017-11-17 RX ADMIN — INSULIN ASPART SCH UNIT: 100 INJECTION, SOLUTION INTRAVENOUS; SUBCUTANEOUS at 19:52

## 2017-11-17 RX ADMIN — BENZTROPINE MESYLATE SCH MG: 0.5 TABLET ORAL at 09:15

## 2017-11-17 RX ADMIN — BENZTROPINE MESYLATE SCH MG: 0.5 TABLET ORAL at 21:34

## 2017-11-17 RX ADMIN — LORATADINE, PSEUDOEPHEDRINE SULFATE SCH EACH: 5; 120 TABLET, FILM COATED, EXTENDED RELEASE ORAL at 21:35

## 2017-11-17 RX ADMIN — PROPRANOLOL HYDROCHLORIDE SCH MG: 20 TABLET ORAL at 21:35

## 2017-11-17 RX ADMIN — FAMOTIDINE SCH MG: 20 TABLET, FILM COATED ORAL at 19:35

## 2017-11-17 RX ADMIN — LISINOPRIL SCH MG: 20 TABLET ORAL at 21:35

## 2017-11-17 RX ADMIN — INSULIN ASPART SCH: 100 INJECTION, SOLUTION INTRAVENOUS; SUBCUTANEOUS at 07:40

## 2017-11-17 RX ADMIN — INSULIN ASPART SCH: 100 INJECTION, SOLUTION INTRAVENOUS; SUBCUTANEOUS at 17:12

## 2017-11-17 RX ADMIN — PROPRANOLOL HYDROCHLORIDE SCH MG: 20 TABLET ORAL at 09:15

## 2017-11-17 RX ADMIN — LORATADINE, PSEUDOEPHEDRINE SULFATE SCH EACH: 5; 120 TABLET, FILM COATED, EXTENDED RELEASE ORAL at 09:14

## 2017-11-17 RX ADMIN — INSULIN ASPART SCH: 100 INJECTION, SOLUTION INTRAVENOUS; SUBCUTANEOUS at 12:26

## 2017-11-18 LAB
GLUCOSE BLD-MCNC: 105 MG/DL (ref 75–99)
GLUCOSE BLD-MCNC: 115 MG/DL (ref 75–99)
GLUCOSE BLD-MCNC: 142 MG/DL (ref 75–99)
GLUCOSE BLD-MCNC: 157 MG/DL (ref 75–99)

## 2017-11-18 RX ADMIN — INSULIN ASPART SCH: 100 INJECTION, SOLUTION INTRAVENOUS; SUBCUTANEOUS at 17:11

## 2017-11-18 RX ADMIN — LITHIUM CARBONATE SCH MG: 300 CAPSULE, GELATIN COATED ORAL at 08:57

## 2017-11-18 RX ADMIN — BENZTROPINE MESYLATE SCH MG: 0.5 TABLET ORAL at 08:57

## 2017-11-18 RX ADMIN — LITHIUM CARBONATE SCH MG: 300 CAPSULE, GELATIN COATED ORAL at 21:01

## 2017-11-18 RX ADMIN — LORATADINE, PSEUDOEPHEDRINE SULFATE SCH EACH: 5; 120 TABLET, FILM COATED, EXTENDED RELEASE ORAL at 08:57

## 2017-11-18 RX ADMIN — INSULIN ASPART SCH UNIT: 100 INJECTION, SOLUTION INTRAVENOUS; SUBCUTANEOUS at 20:57

## 2017-11-18 RX ADMIN — BENZTROPINE MESYLATE SCH MG: 0.5 TABLET ORAL at 21:02

## 2017-11-18 RX ADMIN — PROPRANOLOL HYDROCHLORIDE SCH MG: 20 TABLET ORAL at 21:01

## 2017-11-18 RX ADMIN — LISINOPRIL SCH MG: 20 TABLET ORAL at 21:02

## 2017-11-18 RX ADMIN — FAMOTIDINE SCH MG: 20 TABLET, FILM COATED ORAL at 08:58

## 2017-11-18 RX ADMIN — FAMOTIDINE SCH MG: 20 TABLET, FILM COATED ORAL at 21:01

## 2017-11-18 RX ADMIN — LORATADINE, PSEUDOEPHEDRINE SULFATE SCH EACH: 5; 120 TABLET, FILM COATED, EXTENDED RELEASE ORAL at 21:02

## 2017-11-18 RX ADMIN — INSULIN ASPART SCH: 100 INJECTION, SOLUTION INTRAVENOUS; SUBCUTANEOUS at 07:46

## 2017-11-18 RX ADMIN — INSULIN ASPART SCH UNIT: 100 INJECTION, SOLUTION INTRAVENOUS; SUBCUTANEOUS at 12:49

## 2017-11-18 RX ADMIN — PROPRANOLOL HYDROCHLORIDE SCH MG: 20 TABLET ORAL at 08:58

## 2017-11-18 NOTE — P.PN
Subjective


Principal diagnosis: 





OCD





Interval History:





Patient is 52 year old  male with OCD and Schizoaffective Disorder who 

has historically taken a combination of multiple medications to stabilize. At 

present, he has been doing well with Luvox, Clozaril, Lithium, Lamictal, Buspar

, and Inderal. Patient was evaluated by Nephrology consulted  helping address 

elevated CR and HTN. [facility] met with patient discussed with him that they did not 

feel [facility] was the best option. Rather, patient will discharge home 

with home services to include twice weekly in home therapy with his [facility] 

therapist and a  visiting throughout the week.  Patient 

remains overall improved, attending groups, eating well, sleeping well, 

compliant with medications. He has not required any emergency medication. No 

acute events, patient remains much improved admission. At this time, patient 

denies SI/HI/AVH.





Mental Status Exam:





   Appearance:  alert, well groomed, appears stated age, steady gait 


   Behavior: no psychomotor agitation or psychomotor retardation, no abnormal 

movements, fair eye contact


   Attitude: cooperative 


   Speech:  normal rate, rhythm, fluency, articulation, volume, and prosody; 

primary language: English


   Mood: jocular


   Affect: bright, reactive, congruent


   Thought processes:  linear


   Thought content: patient does not appear to be responding to internal 

stimuli; patient denies auditory and visual hallucinations, no delusions 

appreciated, endorses 


      obsessions but they are mostly resolved


      engagement


   Insight: fair


   Judgment: fair 


   Cognitive:  oriented to all 3 spheres, average intelligence





Plan:





   Continue hospitalization





   Continue current regimen





   BMP/Lithium level reviewed, CR remains elevated,HGA1C ordered and pending





   Continue to encourage fluids, CR is still high, Decrease Lithium to 300-mg 

PO BID





   Provisional discharge home on 11/20/2017. [facility] has arranged for in home 

therapy x 2 week +  throughout the week





   SW contact [facility] to arrange OP community based resources as patient as 

patient responds well to any form of social engagement   





Objective





- Vital Signs


Vital signs: 


 Vital Signs











Temp  98.3 F   11/16/17 07:00


 


Pulse  77   11/17/17 21:39


 


Resp  18   11/17/17 21:39


 


BP  136/94   11/17/17 21:39


 


Pulse Ox  95   11/14/17 21:48














- Labs


CBC & Chem 7: 


 11/15/17 08:36





 11/17/17 08:34


Labs: 


 Abnormal Lab Results - Last 24 Hours (Table)











  11/16/17 11/17/17 11/17/17 Range/Units





  07:58 06:08 08:34 


 


BUN    22 H  (9-20)  mg/dL


 


Creatinine    1.30 H  (0.66-1.25)  mg/dL


 


Glucose    167 H  (74-99)  mg/dL


 


POC Glucose (mg/dL)   127 H   (75-99)  mg/dL


 


Hemoglobin A1c  6.7 H    (4.0-6.0)  %














  11/17/17 11/17/17 11/17/17 Range/Units





  12:25 17:10 19:43 


 


BUN     (9-20)  mg/dL


 


Creatinine     (0.66-1.25)  mg/dL


 


Glucose     (74-99)  mg/dL


 


POC Glucose (mg/dL)  129 H  108 H  203 H  (75-99)  mg/dL


 


Hemoglobin A1c     (4.0-6.0)  %














Assessment and Plan


(1) Obsessive compulsive disorder


Current Visit: Yes   Status: Chronic   Priority: High   Code(s): F42.9 - 

OBSESSIVE-COMPULSIVE DISORDER, UNSPECIFIED   SNOMED Code(s): 202732812


   





(2) Alcohol use disorder, severe, in sustained remission


Current Visit: Yes   Status: Chronic   Priority: Medium   Code(s): F10.21 - 

ALCOHOL DEPENDENCE, IN REMISSION   SNOMED Code(s): 51988110

## 2017-11-18 NOTE — P.PN
Progress Note - Text


Progress Note Date: 11/18/17





interval history: Patient is seen today in cross coverage for Dr. Lund.  

Reports that overall his mood is doing better but still some depression.  The 

only side effects he makes reference to his some drooling.  He reports that his 

OCD was also bothering him very much prior to admission.  He seems to relay 

that all of his symptoms are somewhat better but still not where he would want 

them to be.  He describes that he is doing better as well in terms of suicidal 

ideation, relays that he does feel safe here in the hospital.





Mental status exam: He is alert and cooperative with the interview.  His speech 

is fluent, not rapid or pressured.  Thought processes are organized.  His mood 

he describes some ongoing depression although it is doing better.  He relays 

that he has continued to have some thoughts of suicide but this as well is 

doing better and he feels safe here in the hospital.no evidence of active 

psychosis symptoms.  He does not show any agitation.





Plan: Patient will be maintained on current psychotropic medication regimen.  

Continue to monitor his mood and monitor regarding any suicidal ideations.  We'

ll continue to cover this patient for Dr. Lund through the weekend.

## 2017-11-19 LAB
GLUCOSE BLD-MCNC: 125 MG/DL (ref 75–99)
GLUCOSE BLD-MCNC: 146 MG/DL (ref 75–99)
GLUCOSE BLD-MCNC: 175 MG/DL (ref 75–99)
GLUCOSE BLD-MCNC: 88 MG/DL (ref 75–99)

## 2017-11-19 RX ADMIN — PROPRANOLOL HYDROCHLORIDE SCH MG: 20 TABLET ORAL at 09:16

## 2017-11-19 RX ADMIN — LORATADINE, PSEUDOEPHEDRINE SULFATE SCH EACH: 5; 120 TABLET, FILM COATED, EXTENDED RELEASE ORAL at 20:49

## 2017-11-19 RX ADMIN — FAMOTIDINE SCH MG: 20 TABLET, FILM COATED ORAL at 09:15

## 2017-11-19 RX ADMIN — INSULIN ASPART SCH UNIT: 100 INJECTION, SOLUTION INTRAVENOUS; SUBCUTANEOUS at 20:16

## 2017-11-19 RX ADMIN — INSULIN ASPART SCH: 100 INJECTION, SOLUTION INTRAVENOUS; SUBCUTANEOUS at 17:06

## 2017-11-19 RX ADMIN — INSULIN ASPART SCH UNIT: 100 INJECTION, SOLUTION INTRAVENOUS; SUBCUTANEOUS at 12:44

## 2017-11-19 RX ADMIN — BENZTROPINE MESYLATE SCH MG: 0.5 TABLET ORAL at 09:14

## 2017-11-19 RX ADMIN — LORATADINE, PSEUDOEPHEDRINE SULFATE SCH EACH: 5; 120 TABLET, FILM COATED, EXTENDED RELEASE ORAL at 09:15

## 2017-11-19 RX ADMIN — LITHIUM CARBONATE SCH MG: 300 CAPSULE, GELATIN COATED ORAL at 09:15

## 2017-11-19 RX ADMIN — FAMOTIDINE SCH MG: 20 TABLET, FILM COATED ORAL at 20:48

## 2017-11-19 RX ADMIN — INSULIN ASPART SCH: 100 INJECTION, SOLUTION INTRAVENOUS; SUBCUTANEOUS at 07:44

## 2017-11-19 RX ADMIN — LITHIUM CARBONATE SCH MG: 300 CAPSULE, GELATIN COATED ORAL at 20:49

## 2017-11-19 RX ADMIN — PROPRANOLOL HYDROCHLORIDE SCH MG: 20 TABLET ORAL at 20:49

## 2017-11-19 RX ADMIN — LISINOPRIL SCH MG: 20 TABLET ORAL at 20:49

## 2017-11-19 RX ADMIN — BENZTROPINE MESYLATE SCH MG: 0.5 TABLET ORAL at 20:47

## 2017-11-19 NOTE — P.PN
Progress Note - Text


Progress Note Date: 11/19/17





Interval history: Patient is seen in cross coverage today for Dr. Lund.  He 

seems to be eating well.  He does state that his sister is coming for visiting 

today.  He does describe some ongoing depression and irritability but relays 

that overall his depression is better.  He admits to some thoughts of suicide 

at times but just tries to distract himself from this.  He does not voice any 

adverse psychotropic medication side effects.





Mental status exam: He is alert and cooperative with the interview.  Speech is 

fluent, not rapid or pressured.  His thought processes are organized.  His mood 

he describes some depression but overall describes his mood is doing better.  

He admits to some thoughts of suicide at times but reports he feels safe here 

on the unit.  He denies any thoughts of harm to others.  No evidence of active 

psychosis or agitation.





Plan: Patient will be maintained on current psychotropic medication regimen.  

Continue to monitor for any medication side effects and continue to monitor his 

mood as well as for any suicidal ideations.  Dr. Lund to resume care this 

patient starting tomorrow.

## 2017-11-20 VITALS
SYSTOLIC BLOOD PRESSURE: 126 MMHG | RESPIRATION RATE: 16 BRPM | HEART RATE: 77 BPM | TEMPERATURE: 98.5 F | DIASTOLIC BLOOD PRESSURE: 69 MMHG

## 2017-11-20 LAB
ANION GAP SERPL CALC-SCNC: 10 MMOL/L
BUN SERPL-SCNC: 24 MG/DL (ref 9–20)
CALCIUM SPEC-MCNC: 8.7 MG/DL (ref 8.4–10.2)
CHLORIDE SERPL-SCNC: 107 MMOL/L (ref 98–107)
CO2 SERPL-SCNC: 22 MMOL/L (ref 22–30)
GLUCOSE BLD-MCNC: 155 MG/DL (ref 75–99)
GLUCOSE SERPL-MCNC: 185 MG/DL (ref 74–99)
LITHIUM SERPL-MCNC: 0.6 MMOL/L
NON-AFRICAN AMERICAN GFR(MDRD): >60
POTASSIUM SERPL-SCNC: 3.7 MMOL/L (ref 3.5–5.1)
SODIUM SERPL-SCNC: 139 MMOL/L (ref 137–145)

## 2017-11-20 RX ADMIN — PROPRANOLOL HYDROCHLORIDE SCH MG: 20 TABLET ORAL at 09:17

## 2017-11-20 RX ADMIN — BENZTROPINE MESYLATE SCH MG: 0.5 TABLET ORAL at 09:17

## 2017-11-20 RX ADMIN — LORATADINE, PSEUDOEPHEDRINE SULFATE SCH EACH: 5; 120 TABLET, FILM COATED, EXTENDED RELEASE ORAL at 09:17

## 2017-11-20 RX ADMIN — FAMOTIDINE SCH MG: 20 TABLET, FILM COATED ORAL at 09:17

## 2017-11-20 RX ADMIN — INSULIN ASPART SCH UNIT: 100 INJECTION, SOLUTION INTRAVENOUS; SUBCUTANEOUS at 07:43

## 2017-11-20 RX ADMIN — LITHIUM CARBONATE SCH MG: 300 CAPSULE, GELATIN COATED ORAL at 09:17

## 2019-07-08 ENCOUNTER — HOSPITAL ENCOUNTER (OUTPATIENT)
Dept: HOSPITAL 47 - RADECHMAIN | Age: 54
Discharge: HOME | End: 2019-07-08
Attending: PHYSICIAN ASSISTANT
Payer: MEDICARE

## 2019-07-08 DIAGNOSIS — I07.1: ICD-10-CM

## 2019-07-08 DIAGNOSIS — I51.7: Primary | ICD-10-CM

## 2019-07-08 PROCEDURE — 93306 TTE W/DOPPLER COMPLETE: CPT

## 2019-07-08 NOTE — ECHOF
Referral Reason:R06.9 Edema, unspecified



MEASUREMENTS

--------

HEIGHT: 175.3 cm

WEIGHT: 98.9 kg

BP: 108/66

RVIDd:   3.6 cm     (< 3.3)

IVSd:   1.4 cm     (0.6 - 1.1)

LVIDd:   5.0 cm     (3.9 - 5.3)

LVPWd:   1.4 cm     (0.6 - 1.1)

IVSs:   1.7 cm

LVIDs:   3.2 cm

LVPWs:   1.8 cm

LAESV Index (A-L):   23.57 ml/m

Ao Diam:   3.8 cm     (2.0 - 3.7)

AV Cusp:   2.2 cm     (1.5 - 2.6)

LA Diam:   4.3 cm     (2.7 - 3.8)

MV EXCURSION:   20.694 mm     (> 18.000)

MV EF SLOPE:   125 mm/s     (70 - 150)

EPSS:   0.6 cm

MV E Destin:   0.57 m/s

MV DecT:   229 ms

MV A Destin:   0.68 m/s

MV E/A Ratio:   0.84 

RAP:   5.00 mmHg

RVSP:   28.15 mmHg







FINDINGS

--------

Sinus rhythm.

This was a technically adequate study.

The left ventricular size is normal.   There is mild concentric left ventricular hypertrophy.   Overa
ll left ventricular systolic function is normal with, an EF between 60 - 65 %.

The right ventricle is mildly enlarged.

Normal LA  size by volume 22+/-6 ml/m2.

The right atrial size is normal.

Interatrial and interventricular septum intact.

The aortic valve is trileaflet and appears structurally normal.   There is no evidence of aortic regu
rgitation.   There is no evidence of aortic stenosis.

No mitral regurgitation.

Mild tricuspid regurgitation present.   There is no evidence of pulmonary hypertension.   The right v
entricular systolic pressure, as measured by Doppler, is 28.15mmHg.

Trace/mild (physiologic)  pulmonic regurgitation.

The aortic root size is normal.

The inferior vena cava is mildly dilated.

Echo free space represents a pericardial fat pad.   There is no pericardial effusion.



CONCLUSIONS

--------

1. Sinus rhythm.

2. This was a technically adequate study.

3. The left ventricular size is normal.

4. There is mild concentric left ventricular hypertrophy.

5. Overall left ventricular systolic function is normal with, an EF between 60 - 65 %.

6. The right ventricle is mildly enlarged.

7. Normal LA size by volume 22+/-6 ml/m2.

8. The right atrial size is normal.

9. Interatrial and interventricular septum intact.

10. The aortic valve is trileaflet and appears structurally normal.

11. There is no evidence of aortic regurgitation.

12. There is no evidence of aortic stenosis.

13. No mitral regurgitation.

14. Mild tricuspid regurgitation present.

15. There is no evidence of pulmonary hypertension.

16. The right ventricular systolic pressure, as measured by Doppler, is 28.15mmHg.

17. Trace/mild (physiologic)  pulmonic regurgitation.

18. The aortic root size is normal.

19. The inferior vena cava is mildly dilated.

20. Echo free space represents a pericardial fat pad.

21. There is no pericardial effusion.





SONOGRAPHER: Demetrice Morase RDCS

## 2020-01-28 ENCOUNTER — HOSPITAL ENCOUNTER (INPATIENT)
Dept: HOSPITAL 47 - EC | Age: 55
LOS: 20 days | Discharge: HOME | DRG: 885 | End: 2020-02-17
Payer: MEDICARE

## 2020-01-28 VITALS — BODY MASS INDEX: 28.9 KG/M2

## 2020-01-28 DIAGNOSIS — Z79.84: ICD-10-CM

## 2020-01-28 DIAGNOSIS — D51.9: ICD-10-CM

## 2020-01-28 DIAGNOSIS — L30.9: ICD-10-CM

## 2020-01-28 DIAGNOSIS — F42.9: ICD-10-CM

## 2020-01-28 DIAGNOSIS — Z82.49: ICD-10-CM

## 2020-01-28 DIAGNOSIS — F10.21: ICD-10-CM

## 2020-01-28 DIAGNOSIS — I10: ICD-10-CM

## 2020-01-28 DIAGNOSIS — Z88.8: ICD-10-CM

## 2020-01-28 DIAGNOSIS — Z88.0: ICD-10-CM

## 2020-01-28 DIAGNOSIS — F25.1: Primary | ICD-10-CM

## 2020-01-28 DIAGNOSIS — Z79.899: ICD-10-CM

## 2020-01-28 DIAGNOSIS — E78.5: ICD-10-CM

## 2020-01-28 DIAGNOSIS — F41.9: ICD-10-CM

## 2020-01-28 DIAGNOSIS — E55.9: ICD-10-CM

## 2020-01-28 DIAGNOSIS — Z83.3: ICD-10-CM

## 2020-01-28 DIAGNOSIS — Z91.5: ICD-10-CM

## 2020-01-28 DIAGNOSIS — E11.9: ICD-10-CM

## 2020-01-28 DIAGNOSIS — Z79.890: ICD-10-CM

## 2020-01-28 DIAGNOSIS — E03.9: ICD-10-CM

## 2020-01-28 DIAGNOSIS — R45.851: ICD-10-CM

## 2020-01-28 LAB — GLUCOSE BLD-MCNC: 126 MG/DL (ref 75–99)

## 2020-01-28 PROCEDURE — 84443 ASSAY THYROID STIM HORMONE: CPT

## 2020-01-28 PROCEDURE — 80053 COMPREHEN METABOLIC PANEL: CPT

## 2020-01-28 PROCEDURE — 80306 DRUG TEST PRSMV INSTRMNT: CPT

## 2020-01-28 PROCEDURE — 80178 ASSAY OF LITHIUM: CPT

## 2020-01-28 PROCEDURE — 80061 LIPID PANEL: CPT

## 2020-01-28 PROCEDURE — 36415 COLL VENOUS BLD VENIPUNCTURE: CPT

## 2020-01-28 PROCEDURE — 93005 ELECTROCARDIOGRAM TRACING: CPT

## 2020-01-28 PROCEDURE — 90686 IIV4 VACC NO PRSV 0.5 ML IM: CPT

## 2020-01-28 PROCEDURE — 83036 HEMOGLOBIN GLYCOSYLATED A1C: CPT

## 2020-01-28 PROCEDURE — 85025 COMPLETE CBC W/AUTO DIFF WBC: CPT

## 2020-01-28 PROCEDURE — 82075 ASSAY OF BREATH ETHANOL: CPT

## 2020-01-28 PROCEDURE — 99285 EMERGENCY DEPT VISIT HI MDM: CPT

## 2020-01-28 RX ADMIN — ATORVASTATIN CALCIUM SCH MG: 10 TABLET, FILM COATED ORAL at 21:09

## 2020-01-28 RX ADMIN — INFLUENZA VIRUS VACCINE ONE: 15; 15; 15; 15 SUSPENSION INTRAMUSCULAR at 18:48

## 2020-01-28 RX ADMIN — LITHIUM CARBONATE SCH MG: 300 CAPSULE, GELATIN COATED ORAL at 21:09

## 2020-01-28 RX ADMIN — GLYCOPYRROLATE SCH MG: 1 TABLET ORAL at 21:10

## 2020-01-28 RX ADMIN — INFLUENZA VIRUS VACCINE ONE MCG: 15; 15; 15; 15 SUSPENSION INTRAMUSCULAR at 18:36

## 2020-01-28 NOTE — ED
Psych HPI





- General


Chief Complaint: Psychiatric Symptoms


Stated Complaint: EPS eval, suicidal


Time Seen by Provider: 01/28/20 10:11


Source: patient


Mode of arrival: ambulatory





- History of Present Illness


Initial Comments: 





Is a 54-year-old male with a history of his affective disorder and depression 

who is here today with his worker because of feeling depressed and suicidal he 

states he would cut his wrist if he could with apparent scissors.  Nothing 

specifically he states he's been trying to avoid going home and he has missed 

his medications recently.  He also is diabetic and has not been taking those 

medications he denies any fevers chills nausea vomiting sweats or other symptoms

at this time.


MD Complaint: suicidal ideation, feels depressed





- Related Data


                                Home Medications











 Medication  Instructions  Recorded  Confirmed


 


Naltrexone Microspheres [Vivitrol] 380 mg IM Q30D 11/14/17 01/28/20


 


Glycopyrrolate [Robinul Forte] 2 mg PO BID 01/28/20 01/28/20


 


Levothyroxine Sodium [Synthroid] 50 mcg PO DAILY 01/28/20 01/28/20


 


Lisinopril [Zestril] 20 mg PO DAILY 01/28/20 01/28/20


 


Lithium Carbonate 1,200 mg PO HS 01/28/20 01/28/20


 


Loratadine [Claritin] 10 mg PO DAILY 01/28/20 01/28/20


 


Omeprazole 20 mg PO BID PRN 01/28/20 01/28/20


 


Propranolol HCl [Inderal Xl] 80 mg PO DAILY 01/28/20 01/28/20


 


Rosuvastatin Calcium [Crestor] 5 mg PO HS 01/28/20 01/28/20


 


fluvoxaMINE MALEATE [Luvox CR] 100 mg PO HS 01/28/20 01/28/20


 


fluvoxaMINE MALEATE [Luvox CR] 200 mg PO QAM 01/28/20 01/28/20


 


rOPINIRole HCL [Requip] 0.5 mg PO HS 01/28/20 01/28/20








                                  Previous Rx's











 Medication  Instructions  Recorded


 


Linagliptin [Tradjenta] 5 mg PO DAILY #14 tab 12/04/17


 


amLODIPine [Norvasc] 10 mg PO DAILY #14 tab 12/04/17


 


busPIRone HCl [Buspar] 30 mg PO BID #28 tab 12/04/17


 


cloZAPine [Clozaril] 150 mg PO HS #0 tab 12/04/17


 


lamoTRIgine [LaMICtal] 150 mg PO BID #28 tab 12/04/17











                                    Allergies











Allergy/AdvReac Type Severity Reaction Status Date / Time


 


Penicillins Allergy  Unknown Verified 01/28/20 12:14





   Childhood  


 


risperidone [From Risperdal] Allergy  Unknown Verified 01/28/20 12:14


 


divalproex sodium AdvReac  Anxiety, Verified 01/28/20 12:14





[From Depakote]   weight gain  














Review of Systems


ROS Statement: 


Those systems with pertinent positive or pertinent negative responses have been 

documented in the HPI.





ROS Other: All systems not noted in ROS Statement are negative.





Past Medical History


Past Medical History: Diabetes Mellitus, GERD/Reflux, Hyperlipidemia, 

Hypertension, Sleep Apnea/CPAP/BIPAP


Additional Past Medical History / Comment(s): Family history of premature 

coronary artery disease. OCD.


History of Any Multi-Drug Resistant Organisms: None Reported


Past Surgical History: Hernia Repair


Past Anesthesia/Blood Transfusion Reactions: No Reported Reaction


Past Psychological History: Anxiety, Bipolar, Depression, Schizoaffective 

Disorder


Smoking Status: Never smoker


Past Alcohol Use History: None Reported


Past Drug Use History: None Reported





- Past Family History


  ** Father


Family Medical History: Congestive Heart Failure (CHF), Coronary Artery Disease 

(CAD), Diabetes Mellitus





  ** Mother


Family Medical History: Cancer





General Exam





- General Exam Comments


Initial Comments: 





This a well-developed well-nourished awake alert oriented 3 male


Limitations: no limitations


General appearance: alert, anxious


Head exam: Present: atraumatic, normocephalic, normal inspection


Eye exam: Present: normal appearance, PERRL, EOMI.  Absent: scleral icterus, 

conjunctival injection, periorbital swelling


ENT exam: Present: normal exam, mucous membranes moist


Neck exam: Present: normal inspection.  Absent: tenderness, meningismus, 

lymphadenopathy


Respiratory exam: Present: normal lung sounds bilaterally.  Absent: respiratory 

distress, wheezes, rales, rhonchi, stridor


Cardiovascular Exam: Present: regular rate, normal rhythm, normal heart sounds. 

Absent: systolic murmur, diastolic murmur, rubs, gallop, clicks


GI/Abdominal exam: Present: soft, normal bowel sounds.  Absent: distended, 

tenderness, guarding, rebound, rigid


Extremities exam: Present: normal inspection, full ROM, normal capillary refill.

 Absent: tenderness, pedal edema, joint swelling, calf tenderness


Back exam: Present: normal inspection


Neurological exam: Present: alert, oriented X3, CN II-XII intact


Psychiatric exam: Present: depressed, flat affect, suicidal ideation


Skin exam: Present: warm, dry, intact, normal color.  Absent: rash





Course


                                   Vital Signs











  01/28/20 01/28/20 01/28/20





  10:07 10:10 11:10


 


Temperature 97.9 F  


 


Pulse Rate 85  


 


Respiratory 20 20 20





Rate   


 


Blood Pressure 122/85  


 


O2 Sat by Pulse 100  





Oximetry   














- Reevaluation(s)


Reevaluation #1: 





01/28/20 13:09


He patient is medically clear for psychiatric evaluation.





Medical Decision Making





- Medical Decision Making





The patient was evaluated by the psychiatric service and will be admitted to 

this facility for inpatient treatment.





- Lab Data


                                   Lab Results











  01/28/20 01/28/20 01/28/20 Range/Units





  11:11 11:36 12:00 


 


POC Glucose (mg/dL)   126 H   (75-99)  mg/dL


 


POC Glu Operater ID   Conchis Bacon   


 


Urine Opiates Screen    Not Detected  (NotDetected)  


 


Ur Oxycodone Screen    Not Detected  (NotDetected)  


 


Urine Methadone Screen    Not Detected  (NotDetected)  


 


Ur Propoxyphene Screen    Not Detected  (NotDetected)  


 


Ur Barbiturates Screen    Not Detected  (NotDetected)  


 


U Tricyclic Antidepress    Not Detected  (NotDetected)  


 


Ur Phencyclidine Scrn    Not Detected  (NotDetected)  


 


Ur Amphetamines Screen    Not Detected  (NotDetected)  


 


U Methamphetamines Scrn    Not Detected  (NotDetected)  


 


U Benzodiazepines Scrn    Not Detected  (NotDetected)  


 


Lithium  0.6    mmol/L


 


Urine Cocaine Screen    Not Detected  (NotDetected)  


 


U Marijuana (THC) Screen    Not Detected  (NotDetected)  














Disposition


Clinical Impression: 


 Depression, Suicidal ideation





Disposition: TRANSFER TO PSYCH HOSP/UNIT


Condition: Stable


Referrals: 


Enoc Adame MD [Primary Care Provider] - 1-2 days

## 2020-01-29 LAB
ALBUMIN SERPL-MCNC: 4 G/DL (ref 3.5–5)
ALP SERPL-CCNC: 64 U/L (ref 38–126)
ALT SERPL-CCNC: 14 U/L (ref 4–49)
ANION GAP SERPL CALC-SCNC: 9 MMOL/L
AST SERPL-CCNC: 22 U/L (ref 17–59)
BASOPHILS # BLD AUTO: 0 K/UL (ref 0–0.2)
BASOPHILS NFR BLD AUTO: 0 %
BUN SERPL-SCNC: 18 MG/DL (ref 9–20)
CALCIUM SPEC-MCNC: 9.5 MG/DL (ref 8.4–10.2)
CHLORIDE SERPL-SCNC: 107 MMOL/L (ref 98–107)
CHOLEST SERPL-MCNC: 103 MG/DL (ref ?–200)
CO2 SERPL-SCNC: 26 MMOL/L (ref 22–30)
EOSINOPHIL # BLD AUTO: 0 K/UL (ref 0–0.7)
EOSINOPHIL NFR BLD AUTO: 0 %
ERYTHROCYTE [DISTWIDTH] IN BLOOD BY AUTOMATED COUNT: 4.6 M/UL (ref 4.3–5.9)
ERYTHROCYTE [DISTWIDTH] IN BLOOD: 13.1 % (ref 11.5–15.5)
GLUCOSE SERPL-MCNC: 181 MG/DL (ref 74–99)
HBA1C MFR BLD: 5.4 % (ref 4–6)
HCT VFR BLD AUTO: 41.1 % (ref 39–53)
HDLC SERPL-MCNC: 30 MG/DL (ref 40–60)
HGB BLD-MCNC: 13.6 GM/DL (ref 13–17.5)
LDLC SERPL CALC-MCNC: 49 MG/DL (ref 0–99)
LYMPHOCYTES # SPEC AUTO: 1.1 K/UL (ref 1–4.8)
LYMPHOCYTES NFR SPEC AUTO: 12 %
MCH RBC QN AUTO: 29.6 PG (ref 25–35)
MCHC RBC AUTO-ENTMCNC: 33.1 G/DL (ref 31–37)
MCV RBC AUTO: 89.5 FL (ref 80–100)
MONOCYTES # BLD AUTO: 0.3 K/UL (ref 0–1)
MONOCYTES NFR BLD AUTO: 4 %
NEUTROPHILS # BLD AUTO: 7.5 K/UL (ref 1.3–7.7)
NEUTROPHILS NFR BLD AUTO: 83 %
PLATELET # BLD AUTO: 239 K/UL (ref 150–450)
POTASSIUM SERPL-SCNC: 3.8 MMOL/L (ref 3.5–5.1)
PROT SERPL-MCNC: 6.7 G/DL (ref 6.3–8.2)
SODIUM SERPL-SCNC: 142 MMOL/L (ref 137–145)
TRIGL SERPL-MCNC: 120 MG/DL (ref ?–150)
WBC # BLD AUTO: 8.9 K/UL (ref 3.8–10.6)

## 2020-01-29 RX ADMIN — ATORVASTATIN CALCIUM SCH MG: 10 TABLET, FILM COATED ORAL at 20:52

## 2020-01-29 RX ADMIN — PROPRANOLOL HYDROCHLORIDE SCH MG: 80 CAPSULE, EXTENDED RELEASE ORAL at 08:37

## 2020-01-29 RX ADMIN — LORATADINE SCH MG: 10 TABLET ORAL at 08:36

## 2020-01-29 RX ADMIN — LISINOPRIL SCH MG: 20 TABLET ORAL at 08:37

## 2020-01-29 RX ADMIN — LEVOTHYROXINE SODIUM SCH MCG: 50 TABLET ORAL at 06:59

## 2020-01-29 RX ADMIN — HYDROCORTISONE SCH: 1 CREAM TOPICAL at 22:04

## 2020-01-29 RX ADMIN — GLYCOPYRROLATE SCH MG: 1 TABLET ORAL at 20:57

## 2020-01-29 RX ADMIN — GLYCOPYRROLATE SCH MG: 1 TABLET ORAL at 08:37

## 2020-01-29 RX ADMIN — LITHIUM CARBONATE SCH MG: 300 CAPSULE, GELATIN COATED ORAL at 20:54

## 2020-01-29 RX ADMIN — HYDROCORTISONE SCH: 1 CREAM TOPICAL at 20:46

## 2020-01-29 RX ADMIN — LINAGLIPTIN SCH MG: 5 TABLET, FILM COATED ORAL at 08:36

## 2020-01-29 NOTE — P.CONS
History of Present Illness





- Reason for Consult


Medical clearance, rash





- History of Present Illness


Patient admitted to psychiatric floor for his depression and suicidal thoughts. 

Patient does have other medical problems involving diabetes mellitus lipidemia 

hypothyroidism and hypertension.  Patient denied any complaints at this time.  

Patient does have a rash in the book in both the legs with scratch marks rashes 

consistent with ALLERGIC reaction.  Patient was told he has some kind of 

dermatitis by his dermatologist.








Review of Systems








REVIEW OF SYSTEMS: 


CONSTITUTIONAL: No fever, no malaise, no fatigue. 


HEENT: No recent visual problems or hearing problems. Denied any sore throat. 


CARDIOVASCULAR: No chest pain, orthopnea, PND, no palpitations, no syncope. 


PULMONARY: No shortness of breath, no cough, no hemoptysis. 


GASTROINTESTINAL: No diarrhea, no nausea, no vomiting, no abdominal pain. 


NEUROLOGICAL: No headaches, no weakness, no numbness. 


HEMATOLOGICAL: Denies any bleeding or petechiae. 


GENITOURINARY: Denies any burning micturition, frequency, or urgency. 


MUSCULOSKELETAL/RHEUMATOLOGICAL: Denies any joint pain, swelling, or any muscle 

pain. 


ENDOCRINE: Denies any polyuria or polydipsia. 





The rest of the 14-point review of systems is negative.











Past Medical History


Past Medical History: Diabetes Mellitus, GERD/Reflux, Hyperlipidemia, 

Hypertension, Sleep Apnea/CPAP/BIPAP


Additional Past Medical History / Comment(s): Family history of premature 

coronary artery disease. OCD.


History of Any Multi-Drug Resistant Organisms: None Reported


Past Surgical History: Hernia Repair


Past Anesthesia/Blood Transfusion Reactions: No Reported Reaction


Past Psychological History: Anxiety, Bipolar, Depression, Schizoaffective 

Disorder


Additional Psychological History / Comment(s): OCD


Smoking Status: Never smoker


Past Alcohol Use History: None Reported


Additional Past Alcohol Use History / Comment(s): Pt states, "I am a recovering 

alcoholic. I haven't had a drink in since Jan 2014."


Past Drug Use History: None Reported


Additional Drug Use History / Comment(s): denies substance abuse





- Past Family History


  ** Father


Family Medical History: Congestive Heart Failure (CHF), Coronary Artery Disease 

(CAD), Diabetes Mellitus





  ** Mother


Family Medical History: Cancer





Medications and Allergies


                                Home Medications











 Medication  Instructions  Recorded  Confirmed  Type


 


Naltrexone Microspheres [Vivitrol] 380 mg IM Q30D 11/14/17 01/28/20 History


 


Linagliptin [Tradjenta] 5 mg PO DAILY #14 tab 12/04/17 01/28/20 Rx


 


amLODIPine [Norvasc] 10 mg PO DAILY #14 tab 12/04/17 01/28/20 Rx


 


busPIRone HCl [Buspar] 30 mg PO BID #28 tab 12/04/17 01/28/20 Rx


 


cloZAPine [Clozaril] 150 mg PO HS #0 tab 12/04/17 01/28/20 Rx


 


lamoTRIgine [LaMICtal] 150 mg PO BID #28 tab 12/04/17 01/28/20 Rx


 


Glycopyrrolate [Robinul Forte] 2 mg PO BID 01/28/20 01/28/20 History


 


Levothyroxine Sodium [Synthroid] 50 mcg PO DAILY 01/28/20 01/28/20 History


 


Lisinopril [Zestril] 20 mg PO DAILY 01/28/20 01/28/20 History


 


Lithium Carbonate 1,200 mg PO HS 01/28/20 01/28/20 History


 


Loratadine [Claritin] 10 mg PO DAILY 01/28/20 01/28/20 History


 


Omeprazole 20 mg PO BID PRN 01/28/20 01/28/20 History


 


Propranolol HCl [Inderal Xl] 80 mg PO DAILY 01/28/20 01/28/20 History


 


Rosuvastatin Calcium [Crestor] 5 mg PO HS 01/28/20 01/28/20 History


 


fluvoxaMINE MALEATE [Luvox CR] 100 mg PO HS 01/28/20 01/28/20 History


 


fluvoxaMINE MALEATE [Luvox CR] 200 mg PO QAM 01/28/20 01/28/20 History


 


rOPINIRole HCL [Requip] 0.5 mg PO HS 01/28/20 01/28/20 History








                                    Allergies











Allergy/AdvReac Type Severity Reaction Status Date / Time


 


Penicillins Allergy  Unknown Verified 01/28/20 16:35





   Childhood  


 


risperidone [From Risperdal] Allergy  Unknown Verified 01/28/20 16:35


 


divalproex sodium AdvReac  Anxiety, Verified 01/28/20 16:35





[From Depakote]   weight gain  














Physical Exam


Vitals: 


                                   Vital Signs











  Temp Pulse Resp BP Pulse Ox


 


 01/29/20 08:35   113 H   131/89 


 


 01/29/20 06:44  97.9 F  65  16  121/70  97


 


 01/28/20 21:13  98.4 F  81   134/64 


 


 01/28/20 16:36  96.7 F L  72  20  130/83 


 


 01/28/20 16:10  96.7 F L  72  20  130/83 


 


 01/28/20 15:59    20  








                                Intake and Output











 01/28/20 01/29/20 01/29/20





 22:59 06:59 14:59


 


Other:   


 


  Weight 88.224 kg  




















PHYSICAL EXAMINATION: 





GENERAL: The patient is alert and oriented x3, not in any acute distress. Well 

developed, well nourished. 


HEENT: Pupils are round and equally reacting to light. EOMI. No scleral icterus.

No conjunctival pallor. Normocephalic, atraumatic. No pharyngeal erythema. No 

thyromegaly. 


CARDIOVASCULAR: S1 and S2 present. No murmurs, rubs, or gallops. 


PULMONARY: Chest is clear to auscultation, no wheezing or crackles. 


ABDOMEN: Soft, nontender, nondistended, normoactive bowel sounds. No palpable 

organomegaly. 


MUSCULOSKELETAL: No joint swelling or deformity.


EXTREMITIES: No cyanosis, clubbing, or pedal edema. 


NEUROLOGICAL: Gross neurological examination did not reveal any focal deficits. 


SKIN: Patient has multiple areas of dermatitis and psoriatic patches in both 

legs.














Results


CBC & Chem 7: 


                                 01/29/20 09:32





                                 01/29/20 09:32


Labs: 


                  Abnormal Lab Results - Last 24 Hours (Table)











  01/29/20 Range/Units





  09:32 


 


Glucose  181 H  (74-99)  mg/dL


 


HDL Cholesterol  30 L  (40-60)  mg/dL














Assessment and Plan


Plan: 


-ALLERGIC dermatitis also reassess: Patient will be started on her cortisone 

cream patient does have dry skin and patient has been scratching all over with 

scratch marks will also order emollient or  Eucerin cream


-Hypertension patient can be resumed on his lisinopril


-Hypothyroidism


-Hyperlipidemia


-Type 2 diabetes mellitus


-Depression: Management as per psychiatric


For above-mentioned medical problems patient will be resumed on appropriate 

medications.

## 2020-01-29 NOTE — P.HP
Psychiatric H&P





- .


H&P Date: 01/29/20


History & Physical: 


IDENTIFYING DATA: He is a 54-year-old single  male who has a history of

a schizoaffective disorder, and OCD.  He presented to the psychiatric unit 

voluntarily with complaints of increasing depression, inability to live in his 

apartment independently and increasing suicidal thoughts.





HISTORY OF PRESENT ILLNESS: He is well known to this service from multiple prior

admissions.  He was last discharged from this unit in December 2017 with a 

diagnosis of obsessive-compulsive disorder, schizoaffective disorder depressed 

type and alcohol use disorder.  He is active with [facility].  He last met with his psychiatrist on 01/24/2020 during which he 

told the psychiatrist that his depression "seems to have lessened."  He 

complained that he was not keeping his apartment as clean as he would like.





Between his outpatient psychiatric appointment and his presentation to our ER he

complained of increasing depression, increased feelings of hopelessness and 

helplessness and increasing suicidal thoughts.  He talked about resisting the 

urge to overdose on his prescription medication.  He perseverated on his 

inability to maintain his apartment.  He complained that the apartment "is a 

wreck" and that he is unable to clean it due to his obsessive and repetitive 

behaviors.  He would consider moving into a room and board situation or a 

smaller apartment.  His obsessive and compulsive behaviors prevents him from 

cleaning the apartment.  He talked about spending 15 minutes to an half-hour 

attempting to clean a corner of his apartment.  He stacks and restacks books and

papers.  He must clean the bathroom several times after use.  He has rituals 

that he follows before leaving the apartment.  The most distressed involved 

moving papers he stacks on the oven before he leaves the apartment.





He feels depressed but was vague and evasive about others symptoms of depressive

disorder such as sleep, appetite, energy or concentration with the exception of 

thoughts to overdose on his prescription medications.  He alleged because of the

severity is depression that he is not been fully compliant with prescribed 

medications.





PAST PSYCHIATRIC HISTORY: He has had over 20 admissions to this psychiatric [facility].  According to the records from [facility] he has a diagnosed as schizoaffective 

disorder depressed type, obsessive compulsive behavior, alcohol use disorder.  

His psychotropic medications include BuSpar 30 mg twice a day, clozapine 150 mg 

at bedtime, Lamictal 150 mg twice a day, lithium carbonate 1200 mg at bedtime, 

Luvox 200 mg in the morning and 100 mg at night, glycopyrrolate 2 mg twice a day

and Vivatrol 380 mg IM every 4 weeks.  He described past suicide attempts by 

overdose of prescription medications.





PAST MEDICAL HISTORY: He has history of GERD, essential hypertension, 

hyperlipidemia, type 2 diabetes, a tonsillectomy dermatitis, and vitamin D 

deficiency and vitamin B-12 anemia.  He is non-psychotropic medications included

Norvasc 10 mg daily, Lipitor 10 mg at bedtime, Synthroid 50 g daily, Cogentin 5

mg daily, lisinopril 20 mg daily, Claritin 10 mg daily, I asked 40 mg daily, 

Inderal LA 80 mg daily and Requip 0.5 mg at bedtime.  Note that he is  unaware 

of the reason for the prescription of the Requip.  Medicine consult appreciated.





ALLERGIES: Penicillin, risperidone, Depakote





SUBSTANCE USE HISTORY: He has a history of an alcohol use disorder as been sober

for last 4 years.  He denied use of other drugs get high, help him sleep or 

changes mood.  His urine drug screen was negative for drugs of abuse.





FAMILY PSYCHIATRIC/SUBSTANCE USE HISTORY: He is unaware of a family history of 

mental health or substance use problems.





LEGAL HISTORY: He denied history of legal problems.  He has a payee but no 

guardian.





SOCIAL HISTORY: He is single and has no children.  Has 2 sisters.  He attended 

special education and graduated from high school.  He last worked in 1995.  He 

currently receives social security disability income.  Lives alone in an 

apartment.





MENTAL STATUS EXAM: He presented as a unshaven 54-year-old  male with 

male pattern balding.  He made eye contact and attended to interview.  He had no

distinction features are prominent physical abnormalities.  He had a depressed 

facial expression.  He was alert and oriented to person, place and time.  He 

showed psychomotor retardation.  He had no abnormal involuntary movements.  His 

gait was slow but steady.  His speech was spontaneous with decreased rate, 

rhythm and volume.  His affect was depressed and not reactive.  He describes 

suicidal ideation and death wishes.  He denied homicidal ideation.  He described

depressive cognitions including hopelessness, helplessness and worthlessness.  

He described multiple obsessions and compulsions (see above).  He did not 

express phobias, ideas reference or paranoid ideation.  His thinking was 

abstract and associations were coherent and logical.  He denied hallucinations 

and did not appear to be responding to internal stimuli.  Global impression of 

intellect is average to below.  He is aware of his mental illness and need for 

treatment.





STRENGTHS: Good physical health, stable income, stable housing, supportive 

engagement with community mental services





WEAKNESSES: Persistent and severe chronic mental illness





IMPRESSION: He is a 54-year-old single  male who has a long history of 

chronic and persistent severe mental illness diagnosis schizoaffective disorder 

and obsessive-compulsive disorder.  He is had multiple psychiatric 

hospitalizations related to increased depression and suicidal ideation.  He 

presented with increased depression and suicidal ideation in the context of 

chronic mental illness that has impaired his ability to live independently.  He 

should be treated inpatient basis with combination of psychopharmacology and 

multimodal therapy. 





PRINCIPLE DIAGNOSIS: Schizoaffective disorder depressed type, obsessive-

compulsive disorder, alcohol use disorder severe in sustained remission





RECOMMENDATION: Admitted to the psychiatric unit.  Safety precautions.  Consult 

medicine for initial physical exam and medical history.   completed

initial psychosocial assessment and coordinate discharge and aftercare services 

with [facility].  Continue his outpatient psychotropic medications 

including BuSpar 30 mg twice a day, clozapine 150 mg at bedtime, Lamictal 150 mg

twice a day, lithium 1200 mg at bedtime, Luvox 200 mg in the morning and 100 mg 

at bedtime, glycopyrrolate 2 mg twice a day and Vivatrol here 80 mg IM every 4 

hours.  Hold Requip because it may contribute to worsening of obsessive or 

compulsive behaviors.  Encourage participation in therapeutic groups and 

activities.  Evaluate clinical status response to treatment daily basis.





                                        


                                    Allergies











Allergy/AdvReac Type Severity Reaction Status Date / Time


 


Penicillins Allergy  Unknown Verified 01/28/20 16:35





   Childhood  


 


risperidone [From Risperdal] Allergy  Unknown Verified 01/28/20 16:35


 


divalproex sodium AdvReac  Anxiety, Verified 01/28/20 16:35





[From Depakote]   weight gain  








                                   Vital Signs











Temp  97.9 F   01/29/20 06:44


 


Pulse  113 H  01/29/20 08:35


 


Resp  16   01/29/20 06:44


 


BP  131/89   01/29/20 08:35


 


Pulse Ox  97   01/29/20 06:44








                                 Intake & Output











 01/28/20 01/29/20 01/29/20





 18:59 06:59 18:59


 


Weight 88.224 kg  








                             Laboratory Last Values











POC Glucose (mg/dL)  126 mg/dL (75-99)  H  01/28/20  11:36    


 


POC Glu Operater ID  Conchis Bacon   01/28/20  11:36    


 


Urine Opiates Screen  Not Detected  (NotDetected)   01/28/20  12:00    


 


Ur Oxycodone Screen  Not Detected  (NotDetected)   01/28/20  12:00    


 


Urine Methadone Screen  Not Detected  (NotDetected)   01/28/20  12:00    


 


Ur Propoxyphene Screen  Not Detected  (NotDetected)   01/28/20  12:00    


 


Ur Barbiturates Screen  Not Detected  (NotDetected)   01/28/20  12:00    


 


U Tricyclic Antidepress  Not Detected  (NotDetected)   01/28/20  12:00    


 


Ur Phencyclidine Scrn  Not Detected  (NotDetected)   01/28/20  12:00    


 


Ur Amphetamines Screen  Not Detected  (NotDetected)   01/28/20  12:00    


 


U Methamphetamines Scrn  Not Detected  (NotDetected)   01/28/20  12:00    


 


U Benzodiazepines Scrn  Not Detected  (NotDetected)   01/28/20  12:00    


 


Lithium  0.6 mmol/L  01/28/20  11:11    


 


Urine Cocaine Screen  Not Detected  (NotDetected)   01/28/20  12:00    


 


U Marijuana (THC) Screen  Not Detected  (NotDetected)   01/28/20  12:00    











01/29/20 11:11





01/29/20 15:32

## 2020-01-30 LAB
GLUCOSE BLD-MCNC: 126 MG/DL (ref 75–99)
GLUCOSE BLD-MCNC: 78 MG/DL (ref 75–99)

## 2020-01-30 RX ADMIN — LORATADINE SCH MG: 10 TABLET ORAL at 08:59

## 2020-01-30 RX ADMIN — LITHIUM CARBONATE SCH MG: 300 CAPSULE, GELATIN COATED ORAL at 21:14

## 2020-01-30 RX ADMIN — HYDROCORTISONE SCH: 1 CREAM TOPICAL at 18:29

## 2020-01-30 RX ADMIN — INSULIN ASPART SCH: 100 INJECTION, SOLUTION INTRAVENOUS; SUBCUTANEOUS at 20:22

## 2020-01-30 RX ADMIN — LEVOTHYROXINE SODIUM SCH MCG: 50 TABLET ORAL at 08:57

## 2020-01-30 RX ADMIN — LISINOPRIL SCH MG: 20 TABLET ORAL at 08:59

## 2020-01-30 RX ADMIN — ATORVASTATIN CALCIUM SCH MG: 10 TABLET, FILM COATED ORAL at 21:13

## 2020-01-30 RX ADMIN — LINAGLIPTIN SCH MG: 5 TABLET, FILM COATED ORAL at 08:59

## 2020-01-30 RX ADMIN — GLYCOPYRROLATE SCH MG: 1 TABLET ORAL at 08:58

## 2020-01-30 RX ADMIN — HYDROCORTISONE SCH: 1 CREAM TOPICAL at 09:35

## 2020-01-30 RX ADMIN — HYDROCORTISONE SCH: 1 CREAM TOPICAL at 13:02

## 2020-01-30 RX ADMIN — GLYCOPYRROLATE SCH MG: 1 TABLET ORAL at 21:17

## 2020-01-30 RX ADMIN — HYDROCORTISONE SCH: 1 CREAM TOPICAL at 21:16

## 2020-01-30 RX ADMIN — PROPRANOLOL HYDROCHLORIDE SCH MG: 80 CAPSULE, EXTENDED RELEASE ORAL at 09:01

## 2020-01-30 RX ADMIN — INSULIN ASPART SCH: 100 INJECTION, SOLUTION INTRAVENOUS; SUBCUTANEOUS at 17:56

## 2020-01-30 NOTE — P.PN
Subjective


Progress Note Date: 01/30/20


Principal diagnosis: 


Schizoaffective disorder depressed type, obsessive-compulsive disorder, alcohol 

use disorder sustained remission





I reviewed the medical record, interviewed the patient and discussed his 

treatment and treatment plan during team meeting.  He complained of feeling 

"tired" and depressed-"I'm still having suicidal thoughts."  He denied 

experiencing obsessional thoughts or compulsive behaviors since admission to the

unit.  He denied psychotic symptoms as hallucinations, paranoia, ideas 

reference, thought insertion etc.  He remains distressed about living in his 

current apartment.  We talked more about the condition of the apartment.  I 

expressed my concern about his habit of placing papers on top of the oven.








Objective





- Vital Signs


Vital signs: 


                                   Vital Signs











Temp  98.0 F   01/29/20 21:06


 


Pulse  83   01/30/20 08:55


 


Resp  18   01/30/20 08:55


 


BP  106/64   01/30/20 08:55


 


Pulse Ox  99   01/29/20 21:06














- Exam


He presented as an unshaven 54-year-old male with loose pants.  He showed 

psychomotor slowing.  He demonstrated no abnormal movements.  His speech was 

soft, slow but fluent.  His affect was depressed and not reactive.  He describes

suicidal thoughts without specific intent or plan.  He denied homicidality.  He 

did not express obsessional thoughts, paranoia or delusional thoughts.  He 

denied hallucinations and did not appear to be responding to internal stimuli.








- Labs


CBC & Chem 7: 


                                 01/29/20 09:32





                                 01/29/20 09:32


Labs: 


                  Abnormal Lab Results - Last 24 Hours (Table)











  01/29/20 Range/Units





  09:32 


 


Glucose  181 H  (74-99)  mg/dL


 


HDL Cholesterol  30 L  (40-60)  mg/dL














Assessment and Plan


Assessment: 


He is moderately mentally ill and minimally improve from admission.  He 

continues reports subjective depression and demonstrates marked psychomotor 

slowing.





Plan: 


Continue inpatient treatment.  Safety precautions.  Continue current dose of his

psychotropic medications-BuSpar, clozapine, Lamictal, lithium, Luvox 

glycopyrrolate and Vivatrol.  Work with  regarding his residential 

concerns.  Encourage participation in therapeutic groups and activities.  

Evaluate clinical status response to treatment daily basis.

## 2020-01-31 LAB
GLUCOSE BLD-MCNC: 132 MG/DL (ref 75–99)
GLUCOSE BLD-MCNC: 76 MG/DL (ref 75–99)
GLUCOSE BLD-MCNC: 85 MG/DL (ref 75–99)
GLUCOSE BLD-MCNC: 85 MG/DL (ref 75–99)

## 2020-01-31 RX ADMIN — LITHIUM CARBONATE SCH MG: 300 CAPSULE, GELATIN COATED ORAL at 20:54

## 2020-01-31 RX ADMIN — INSULIN ASPART SCH: 100 INJECTION, SOLUTION INTRAVENOUS; SUBCUTANEOUS at 12:41

## 2020-01-31 RX ADMIN — LORATADINE SCH MG: 10 TABLET ORAL at 08:57

## 2020-01-31 RX ADMIN — LEVOTHYROXINE SODIUM SCH MCG: 50 TABLET ORAL at 06:03

## 2020-01-31 RX ADMIN — GLYCOPYRROLATE SCH MG: 1 TABLET ORAL at 20:58

## 2020-01-31 RX ADMIN — HYDROCORTISONE SCH: 1 CREAM TOPICAL at 12:45

## 2020-01-31 RX ADMIN — HYDROCORTISONE SCH: 1 CREAM TOPICAL at 19:12

## 2020-01-31 RX ADMIN — LISINOPRIL SCH: 20 TABLET ORAL at 08:58

## 2020-01-31 RX ADMIN — ATORVASTATIN CALCIUM SCH MG: 10 TABLET, FILM COATED ORAL at 20:52

## 2020-01-31 RX ADMIN — HYDROCORTISONE SCH: 1 CREAM TOPICAL at 21:08

## 2020-01-31 RX ADMIN — HYDROCORTISONE SCH: 1 CREAM TOPICAL at 08:58

## 2020-01-31 RX ADMIN — INSULIN ASPART SCH UNIT: 100 INJECTION, SOLUTION INTRAVENOUS; SUBCUTANEOUS at 20:28

## 2020-01-31 RX ADMIN — LINAGLIPTIN SCH MG: 5 TABLET, FILM COATED ORAL at 08:58

## 2020-01-31 RX ADMIN — PROPRANOLOL HYDROCHLORIDE SCH MG: 80 CAPSULE, EXTENDED RELEASE ORAL at 08:57

## 2020-01-31 RX ADMIN — INSULIN ASPART SCH: 100 INJECTION, SOLUTION INTRAVENOUS; SUBCUTANEOUS at 08:18

## 2020-01-31 RX ADMIN — GLYCOPYRROLATE SCH MG: 1 TABLET ORAL at 08:55

## 2020-01-31 RX ADMIN — INSULIN ASPART SCH: 100 INJECTION, SOLUTION INTRAVENOUS; SUBCUTANEOUS at 17:59

## 2020-01-31 NOTE — P.PN
Subjective


Progress Note Date: 01/31/20


Principal diagnosis: 


Schizoaffective disorder depressed type, obsessive-compulsive disorder, alcohol 

use disorder sustained remission





I reviewed the medical record, interviewed the patient and discussed his 

treatment and treatment plan during team meeting.  He complained of continued 

fatigue and depression.  He also expressed feelings of hopelessness and 

helplessness.  Nursing report that he indicated suicidal thoughts on the daily 

goals sheet.





He denied experiencing recurrent obsessional thoughts or engaging in compulsive 

rituals since she's been on the unit.  However, he is collecting paper next his 

bed.





We reviewed his current psychotropic medications.  He alleged that he is unaware

of the duration of his multiple psychotropic medications or their relative 

benefit.  For example, he alleged she is unaware of how long he had been 

prescribed BuSpar or Luvox.





We discussed treatment options and agreed to a trial of clomipramine treatment 

depression and OCD.  We will gradually taper then discontinue BuSpar and Luvox 

and titrate clomipramine from initial dose of 25 mg at bedtime.











Objective





- Vital Signs


Vital signs: 


                                   Vital Signs











Temp  98.1 F   01/31/20 01:52


 


Pulse  72   01/31/20 08:53


 


Resp  16   01/31/20 08:53


 


BP  99/65   01/31/20 08:53


 


Pulse Ox  99   01/29/20 21:06














- Exam


He presented as an unshaven 54-year-old male with loose pants.  He had a depre

ssed and unreactive facial expression.  He showed psychomotor slowing.  His 

speech was soft, slow and halting.  His affect was depressed and not reactive.  

He describes suicidal thoughts without specific intent or plan.  He denied 

homicidality.  He did not express obsessional thoughts, paranoia or delusional 

thoughts.  He denied hallucinations and did not appear to be responding to 

internal stimuli.








- Labs


CBC & Chem 7: 


                                 01/29/20 09:32





                                 01/29/20 09:32


Labs: 


                  Abnormal Lab Results - Last 24 Hours (Table)











  01/30/20 Range/Units





  19:56 


 


POC Glucose (mg/dL)  126 H  (75-99)  mg/dL














Assessment and Plan


Assessment: 


He is moderately mentally ill and minimally improve from admission.  He 

continues reports subjective depression and demonstrates marked psychomotor 

slowing.





Plan: 


Continue inpatient treatment.  Safety precautions.  Decrease BuSpar to 20 mg by 

mouth twice a day.  Decrease Luvox to 200 mg a.m.  Begin clomipramine 25 mg at 

bedtime and titrated according to clinical response and tolerance.  Continue 

clozapine 150 mg at bedtime, glycopyrrolate 2 mg by mouth twice a day, Lamictal 

150 mg twice a day, lithium carbonate 1200 mg at bedtime and Ativan 0.5 mg twice

a day.  Continue Ativan 0.5 mg IM twice a day when necessary for agitation or an

xiety.   to coordinate discharge and aftercare services with 

community mental health.  Encourage participation in therapeutic groups and 

activities.  Evaluate clinical status response to treatment daily basis.

## 2020-02-01 LAB
GLUCOSE BLD-MCNC: 101 MG/DL (ref 75–99)
GLUCOSE BLD-MCNC: 72 MG/DL (ref 75–99)
GLUCOSE BLD-MCNC: 72 MG/DL (ref 75–99)
GLUCOSE BLD-MCNC: 75 MG/DL (ref 75–99)

## 2020-02-01 RX ADMIN — HYDROCORTISONE SCH: 1 CREAM TOPICAL at 18:02

## 2020-02-01 RX ADMIN — LINAGLIPTIN SCH MG: 5 TABLET, FILM COATED ORAL at 08:44

## 2020-02-01 RX ADMIN — LEVOTHYROXINE SODIUM SCH MCG: 50 TABLET ORAL at 06:45

## 2020-02-01 RX ADMIN — HYDROCORTISONE SCH: 1 CREAM TOPICAL at 21:29

## 2020-02-01 RX ADMIN — HYDROCORTISONE SCH: 1 CREAM TOPICAL at 14:22

## 2020-02-01 RX ADMIN — INSULIN ASPART SCH: 100 INJECTION, SOLUTION INTRAVENOUS; SUBCUTANEOUS at 08:42

## 2020-02-01 RX ADMIN — INSULIN ASPART SCH: 100 INJECTION, SOLUTION INTRAVENOUS; SUBCUTANEOUS at 18:02

## 2020-02-01 RX ADMIN — LORATADINE SCH MG: 10 TABLET ORAL at 08:44

## 2020-02-01 RX ADMIN — LITHIUM CARBONATE SCH MG: 300 CAPSULE, GELATIN COATED ORAL at 21:27

## 2020-02-01 RX ADMIN — ATORVASTATIN CALCIUM SCH MG: 10 TABLET, FILM COATED ORAL at 21:27

## 2020-02-01 RX ADMIN — HYDROCORTISONE SCH: 1 CREAM TOPICAL at 08:43

## 2020-02-01 RX ADMIN — INSULIN ASPART SCH: 100 INJECTION, SOLUTION INTRAVENOUS; SUBCUTANEOUS at 12:59

## 2020-02-01 RX ADMIN — GLYCOPYRROLATE SCH MG: 1 TABLET ORAL at 21:27

## 2020-02-01 RX ADMIN — PROPRANOLOL HYDROCHLORIDE SCH MG: 80 CAPSULE, EXTENDED RELEASE ORAL at 08:44

## 2020-02-01 RX ADMIN — LISINOPRIL SCH MG: 20 TABLET ORAL at 08:44

## 2020-02-01 RX ADMIN — INSULIN ASPART SCH: 100 INJECTION, SOLUTION INTRAVENOUS; SUBCUTANEOUS at 21:27

## 2020-02-01 RX ADMIN — GLYCOPYRROLATE SCH MG: 1 TABLET ORAL at 08:43

## 2020-02-01 NOTE — P.PN
Progress Note - Text


Progress Note Date: 02/01/20








Subjective:


Patient was seen today as a cross coverage for . The patient was 

evaluated, chart reviewed, case discussed with the treatment team.  Patient 

reported interrupted sleep last night.  Appetite was reported as "not bad ".  

Patient has not been going to groups and other unit activities. The patient is 

compliant with his medications and denies any adverse reactions.  Patient 

reports continued to feel depressed and has intermittent suicidal ideation.  He 

committed for his safety in the hospital but reports doesn't feel safe outside 

the hospital.  He reports continued to hear voices usually derogatory and angry 

at him.  He reports his anxiety is high.


Patient denies any current manic symptoms including sustained period of time 

with elevated or irritable mood, impulsive or irrational behavior, inflated 

self-esteem, or absence need to sleep due to increases goal-directed activities.

 





Objective:





Vitals has been reviewed.





Mental status examination; 


Appearance: The patient appears older than his stated age, poorly groomed, 

average body built, no specific features.


Gait/posture: Patient was seen lying in bed, Normal arm  swinging: No abnormal 

movements.


Attitude and behavior:  engaged,  cooperative, intermittent eye contact.


Motor activity: Normal psychomotor activity


Speech: Slow


Mood: Depressed


Affect: Restricted


Thought form:  goal-directed, linear, coherent.


Thought content: Non-delusional, reports intermittent suicidal thoughts, denies 

homicidal thoughts, denies intentions or plans. 


Perception: Reports auditory, but denies visual hallucinations


Attention: No impairment.  Patient was able to repeat serial 5.  


Orientation: Patient patient was oriented to time place person and situation.


Insight: Patient has limited insight about his psychiatric disorder.


Judgment: Patient has limited judgment about his psychiatric treatment.





Assessment:


Schizoaffective disorder.


Obsessive-compulsive disorder.


Alcohol use disorder severe in remission.








Plan:


Continue inpatient level of care due to need for further stabilization on 

psychiatric medications, and he continued to feel depressed and suicidal.


Precautions: Continue 15 minutes check for safety.


Consider medical consultation if any acute medical issues arise.


Provide the patient individual, group therapy, substance use disorder counseling

to give better insight and learn coping skills.





Medications:


Continue clozapine for psychotic symptoms and as a mood stabilizer.


Continue clomipramine for OCD symptoms and depression symptoms.


Continue the plan to taper off Lovenox and BuSpar.


Continue when necessary medications.





Labs: Continue CBC as per clozapine registry recommendations.





Discharge patient to OUTPATIENT services upon a stabilization

## 2020-02-02 LAB
GLUCOSE BLD-MCNC: 113 MG/DL (ref 75–99)
GLUCOSE BLD-MCNC: 78 MG/DL (ref 75–99)
GLUCOSE BLD-MCNC: 80 MG/DL (ref 75–99)
GLUCOSE BLD-MCNC: 82 MG/DL (ref 75–99)

## 2020-02-02 RX ADMIN — INSULIN ASPART SCH: 100 INJECTION, SOLUTION INTRAVENOUS; SUBCUTANEOUS at 17:40

## 2020-02-02 RX ADMIN — LINAGLIPTIN SCH MG: 5 TABLET, FILM COATED ORAL at 08:45

## 2020-02-02 RX ADMIN — LEVOTHYROXINE SODIUM SCH MCG: 50 TABLET ORAL at 08:45

## 2020-02-02 RX ADMIN — LISINOPRIL SCH MG: 20 TABLET ORAL at 08:46

## 2020-02-02 RX ADMIN — GLYCOPYRROLATE SCH MG: 1 TABLET ORAL at 21:00

## 2020-02-02 RX ADMIN — PROPRANOLOL HYDROCHLORIDE SCH MG: 80 CAPSULE, EXTENDED RELEASE ORAL at 08:46

## 2020-02-02 RX ADMIN — HYDROCORTISONE SCH: 1 CREAM TOPICAL at 21:47

## 2020-02-02 RX ADMIN — HYDROCORTISONE SCH: 1 CREAM TOPICAL at 12:43

## 2020-02-02 RX ADMIN — INSULIN ASPART SCH: 100 INJECTION, SOLUTION INTRAVENOUS; SUBCUTANEOUS at 21:00

## 2020-02-02 RX ADMIN — HYDROCORTISONE SCH: 1 CREAM TOPICAL at 08:49

## 2020-02-02 RX ADMIN — LITHIUM CARBONATE SCH MG: 300 CAPSULE, GELATIN COATED ORAL at 21:06

## 2020-02-02 RX ADMIN — INSULIN ASPART SCH: 100 INJECTION, SOLUTION INTRAVENOUS; SUBCUTANEOUS at 08:40

## 2020-02-02 RX ADMIN — LORATADINE SCH MG: 10 TABLET ORAL at 08:46

## 2020-02-02 RX ADMIN — INSULIN ASPART SCH: 100 INJECTION, SOLUTION INTRAVENOUS; SUBCUTANEOUS at 12:43

## 2020-02-02 RX ADMIN — GLYCOPYRROLATE SCH MG: 1 TABLET ORAL at 08:46

## 2020-02-02 RX ADMIN — HYDROCORTISONE SCH: 1 CREAM TOPICAL at 19:56

## 2020-02-02 RX ADMIN — ATORVASTATIN CALCIUM SCH MG: 10 TABLET, FILM COATED ORAL at 21:00

## 2020-02-02 NOTE — P.PN
Progress Note - Text


Progress Note Date: 02/02/20








Subjective:


Patient was seen today as a cross coverage for . The patient was 

evaluated, chart reviewed, case discussed with the treatment team.  Patient 

continued to report feeling depressed and suicidal.  He reports continued to 

have intermittent suicidal thoughts "comes and goes" and he continued to hear 

voices but he couldn't give further information about auditory hallucinations.  

Patient reports better sleep last night and denies any appetite problems.  

Patient has not been going to groups and has minimal interaction with others.  

The patient is compliant with his psychiatric medications and denies any adverse

reaction.  He reports less anxiety today.  Patient denies any manic symptoms 

including a fork mood, lack need to sleep due to increased activities, or 

irrational uninhibited behavior.








Objective:





Vitals has been reviewed.





Mental status examination; 


Appearance: The patient appears older than his stated age, poorly groomed, 

average body built, no specific features.


Gait/posture: Patient was seen lying in bed, Normal arm  swinging: No abnormal 

movements.


Attitude and behavior:  engaged,  cooperative, intermittent eye contact.


Motor activity: Normal psychomotor activity


Speech: Slow


Mood: Depressed


Affect: Restricted


Thought form:  goal-directed, linear, coherent.


Thought content: Non-delusional, reports intermittent suicidal thoughts, denies 

homicidal thoughts, denies intentions or plans. 


Perception: Reports auditory, but denies visual hallucinations


Attention: No impairment.  Patient was able to repeat serial 5.  


Orientation: Patient patient was oriented to time place person and situation.


Insight: Patient has limited insight about his psychiatric disorder.


Judgment: Patient has limited judgment about his psychiatric treatment.





Assessment:


Schizoaffective disorder.


Obsessive-compulsive disorder.


Alcohol use disorder severe in remission.








Plan:


Continue inpatient level of care due to need for further stabilization on 

psychiatric medications, and he continued to feel depressed and suicidal.


Precautions: Continue 15 minutes check for safety.


Consider medical consultation if any acute medical issues arise.


Provide the patient individual, group therapy, substance use disorder counseling

to give better insight and learn coping skills.





Medications:


Continue clozapine for psychotic symptoms and as a mood stabilizer.


Continue clomipramine for OCD symptoms and depression symptoms.


Continue the plan to discontinue Luvox and BuSpar.


Continue when necessary medications.





Labs: Continue CBC monitoring as clinically indicated by clozapine registry 

recommendations.  


Discharge patient to OUTPATIENT services upon a stabilization

## 2020-02-03 LAB
ALBUMIN SERPL-MCNC: 4 G/DL (ref 3.5–5)
ALP SERPL-CCNC: 62 U/L (ref 38–126)
ALT SERPL-CCNC: 17 U/L (ref 4–49)
ANION GAP SERPL CALC-SCNC: 5 MMOL/L
AST SERPL-CCNC: 25 U/L (ref 17–59)
BUN SERPL-SCNC: 19 MG/DL (ref 9–20)
CALCIUM SPEC-MCNC: 9.8 MG/DL (ref 8.4–10.2)
CHLORIDE SERPL-SCNC: 108 MMOL/L (ref 98–107)
CO2 SERPL-SCNC: 29 MMOL/L (ref 22–30)
GLUCOSE BLD-MCNC: 113 MG/DL (ref 75–99)
GLUCOSE BLD-MCNC: 178 MG/DL (ref 75–99)
GLUCOSE BLD-MCNC: 81 MG/DL (ref 75–99)
GLUCOSE BLD-MCNC: 89 MG/DL (ref 75–99)
GLUCOSE BLD-MCNC: 91 MG/DL (ref 75–99)
GLUCOSE SERPL-MCNC: 79 MG/DL (ref 74–99)
POTASSIUM SERPL-SCNC: 3.7 MMOL/L (ref 3.5–5.1)
PROT SERPL-MCNC: 6.6 G/DL (ref 6.3–8.2)
SODIUM SERPL-SCNC: 142 MMOL/L (ref 137–145)

## 2020-02-03 RX ADMIN — INSULIN ASPART SCH: 100 INJECTION, SOLUTION INTRAVENOUS; SUBCUTANEOUS at 13:27

## 2020-02-03 RX ADMIN — PROPRANOLOL HYDROCHLORIDE SCH: 80 CAPSULE, EXTENDED RELEASE ORAL at 09:38

## 2020-02-03 RX ADMIN — INSULIN ASPART SCH: 100 INJECTION, SOLUTION INTRAVENOUS; SUBCUTANEOUS at 20:26

## 2020-02-03 RX ADMIN — HYDROCORTISONE SCH: 1 CREAM TOPICAL at 13:28

## 2020-02-03 RX ADMIN — LEVOTHYROXINE SODIUM SCH MCG: 50 TABLET ORAL at 06:21

## 2020-02-03 RX ADMIN — GLYCOPYRROLATE SCH MG: 1 TABLET ORAL at 11:35

## 2020-02-03 RX ADMIN — LORATADINE SCH MG: 10 TABLET ORAL at 11:34

## 2020-02-03 RX ADMIN — GLYCOPYRROLATE SCH MG: 1 TABLET ORAL at 21:30

## 2020-02-03 RX ADMIN — LINAGLIPTIN SCH MG: 5 TABLET, FILM COATED ORAL at 11:33

## 2020-02-03 RX ADMIN — LISINOPRIL SCH: 20 TABLET ORAL at 09:38

## 2020-02-03 RX ADMIN — ATORVASTATIN CALCIUM SCH MG: 10 TABLET, FILM COATED ORAL at 21:26

## 2020-02-03 RX ADMIN — LITHIUM CARBONATE SCH MG: 300 CAPSULE, GELATIN COATED ORAL at 21:28

## 2020-02-03 RX ADMIN — HYDROCORTISONE SCH: 1 CREAM TOPICAL at 09:38

## 2020-02-03 RX ADMIN — INSULIN ASPART SCH: 100 INJECTION, SOLUTION INTRAVENOUS; SUBCUTANEOUS at 18:08

## 2020-02-03 RX ADMIN — HYDROCORTISONE SCH: 1 CREAM TOPICAL at 18:08

## 2020-02-03 RX ADMIN — INSULIN ASPART SCH: 100 INJECTION, SOLUTION INTRAVENOUS; SUBCUTANEOUS at 07:30

## 2020-02-03 RX ADMIN — HYDROCORTISONE SCH APPLIC: 1 CREAM TOPICAL at 21:31

## 2020-02-03 NOTE — P.PN
Subjective


Progress Note Date: 02/03/20


Principal diagnosis: 


Schizoaffective disorder depressed type, obsessive-compulsive disorder, alcohol 

use disorder sustained remission





I reviewed the medical record, interviewed the patient and discussed his 

treatment and treatment plan during team meeting.  He complained of continued 

depression and suicidal thoughts although he denied intent or plan.  He feels 

hopeless and helpless regarding his mental illness, his future and his living 

situation.  His affect brightened briefly after I told him that [facility] is 

considering [facility].  He intermittently attends therapeutic groups and 

activities.  He sleeps between 4-6 hours per night.  He is accumulating more 

items at his bedside.  He has 3 paper bags filled with clothes and other items 

and aplastic container with miscellaneous papers.





He felt weak and fatigued this morning and his blood pressure was 90/60 morning.











Objective





- Vital Signs


Vital signs: 


                                   Vital Signs











Temp  97.7 F   02/03/20 06:29


 


Pulse  73   02/03/20 06:29


 


Resp  16   02/03/20 06:29


 


BP  103/58   02/03/20 06:29


 


Pulse Ox  98   02/01/20 08:50








                                 Intake & Output











 02/02/20 02/03/20 02/03/20





 18:59 06:59 18:59


 


Weight 87.3 kg  














- Exam


He presented as an unshaven 54-year-old male with loose pants.  He had a 

depressed and unreactive facial expression.  Her speech and movements were slow 

His speech was soft, slow and halting.  His affect was depressed and not 

reactive.  He describes suicidal thoughts without specific intent or plan.  He 

denied homicidality.  He did not express obsessional thoughts, paranoia or 

delusional thoughts.  He denied hallucinations and did not appear to be 

responding to internal stimuli.








- Labs


CBC & Chem 7: 


                                 01/29/20 09:32





                                 01/29/20 09:32


Labs: 


                  Abnormal Lab Results - Last 24 Hours (Table)











  02/02/20 Range/Units





  19:47 


 


POC Glucose (mg/dL)  113 H  (75-99)  mg/dL














Assessment and Plan


Assessment: 


He remains depressed, hopeless and helpless and continues to express suicidal 

thoughts.  He is minimally improved from admission.  He has become hypotensive 

probably due to the combination of multiple antihypertensive medication and his 

psychotropic medications.





Plan: 


Continue inpatient treatment.  Safety precautions.  Obtain a EKG.  Hold Inderal,

Norvasc and lisinopril if his diastolic blood pressure is 90 or less.  Decrease 

BuSpar to 15 mg by mouth twice a day.  Decrease Luvox to 100 mg a.m.  Incease 

clomipramine 25 mg BID and titrated according to clinical response and 

tolerance.  Continue clozapine 150 mg at bedtime, glycopyrrolate 2 mg by mouth 

twice a day, Lamictal 150 mg twice a day, lithium carbonate 1200 mg at bedtime 

and Ativan 0.5 mg twice a day.  Continue Ativan 0.5 mg IM twice a day when 

necessary for agitation or anxiety.   to coordinate discharge and 

aftercare services with [facility] mental health.  Encourage participation in 

therapeutic groups and activities.  Evaluate clinical status response to 

treatment daily basis.

## 2020-02-04 LAB
GLUCOSE BLD-MCNC: 114 MG/DL (ref 75–99)
GLUCOSE BLD-MCNC: 89 MG/DL (ref 75–99)
GLUCOSE BLD-MCNC: 90 MG/DL (ref 75–99)
GLUCOSE BLD-MCNC: 94 MG/DL (ref 75–99)

## 2020-02-04 RX ADMIN — HYDROCORTISONE SCH APPLIC: 1 CREAM TOPICAL at 13:35

## 2020-02-04 RX ADMIN — INSULIN ASPART SCH: 100 INJECTION, SOLUTION INTRAVENOUS; SUBCUTANEOUS at 20:57

## 2020-02-04 RX ADMIN — LEVOTHYROXINE SODIUM SCH MCG: 50 TABLET ORAL at 06:11

## 2020-02-04 RX ADMIN — HYDROCORTISONE SCH APPLIC: 1 CREAM TOPICAL at 09:07

## 2020-02-04 RX ADMIN — INSULIN ASPART SCH: 100 INJECTION, SOLUTION INTRAVENOUS; SUBCUTANEOUS at 17:56

## 2020-02-04 RX ADMIN — ATORVASTATIN CALCIUM SCH MG: 10 TABLET, FILM COATED ORAL at 20:55

## 2020-02-04 RX ADMIN — PROPRANOLOL HYDROCHLORIDE SCH MG: 80 CAPSULE, EXTENDED RELEASE ORAL at 09:05

## 2020-02-04 RX ADMIN — INSULIN ASPART SCH: 100 INJECTION, SOLUTION INTRAVENOUS; SUBCUTANEOUS at 08:38

## 2020-02-04 RX ADMIN — LORATADINE SCH MG: 10 TABLET ORAL at 09:06

## 2020-02-04 RX ADMIN — GLYCOPYRROLATE SCH MG: 1 TABLET ORAL at 09:05

## 2020-02-04 RX ADMIN — LISINOPRIL SCH MG: 20 TABLET ORAL at 09:06

## 2020-02-04 RX ADMIN — HYDROCORTISONE SCH APPLIC: 1 CREAM TOPICAL at 18:56

## 2020-02-04 RX ADMIN — LITHIUM CARBONATE SCH MG: 300 CAPSULE, GELATIN COATED ORAL at 20:53

## 2020-02-04 RX ADMIN — LINAGLIPTIN SCH MG: 5 TABLET, FILM COATED ORAL at 09:06

## 2020-02-04 RX ADMIN — GLYCOPYRROLATE SCH MG: 1 TABLET ORAL at 20:53

## 2020-02-04 RX ADMIN — HYDROCORTISONE SCH: 1 CREAM TOPICAL at 23:08

## 2020-02-04 RX ADMIN — INSULIN ASPART SCH: 100 INJECTION, SOLUTION INTRAVENOUS; SUBCUTANEOUS at 13:33

## 2020-02-04 NOTE — P.PN
Subjective


Progress Note Date: 02/04/20


Principal diagnosis: 


Schizoaffective disorder depressed type, obsessive-compulsive disorder, alcohol 

use disorder sustained remission





I reviewed the medical record, interviewed the patient and discussed his 

treatment and treatment plan during team meeting.  He was more upbeat today 

because he spoke with his peer support specialists and the [facility] liaison who both 

supported his plan to leave his apartment.  The  also spoke with 

his sister who is recommending group home placement.  However he complained of 

continued feelings of depression and continued suicidal thoughts.  He was vague 

about the nature of the suicidal thoughts and they appeared to be passive in chad

ure where he has and intrusive thoughts that he wishes he were dead.  He denied 

suicide intent or plan.





We again reviewed the medication changes and he agreed to continue with the plan

to taper it and discontinue both Luvox and BuSpar and continue without titration

of Anafranil.  He did not experience weakness or lightheadedness this morning.  

He received the prescribed doses of his antihypertensive this morning.








Objective





- Vital Signs


Vital signs: 


                                   Vital Signs











Temp  98.1 F   02/04/20 06:00


 


Pulse  85   02/04/20 11:23


 


Resp  16   02/04/20 11:23


 


BP  127/75   02/04/20 11:23


 


Pulse Ox  97   02/04/20 06:00








                                 Intake & Output











 02/03/20 02/04/20 02/04/20





 18:59 06:59 18:59


 


Weight   87.3 kg














- Exam


He presented as an unshaven 54-year-old male with loose pants.  He had a 

depressed facial expression that was  and expressive.  Her speech and 

movements were slow His speech was soft, slow and halting.  His affect was 

depressed and minimally reactive.  He describes suicidal thoughts without 

specific intent or plan.  He denied homicidality.  He did not express 

obsessional thoughts, paranoia or delusional thoughts.  He denied hallucinations

and did not appear to be responding to internal stimuli.








- Labs


CBC & Chem 7: 


                                 01/29/20 09:32





                                 02/03/20 12:26


Labs: 


                  Abnormal Lab Results - Last 24 Hours (Table)











  02/03/20 Range/Units





  19:58 


 


POC Glucose (mg/dL)  113 H  (75-99)  mg/dL














Assessment and Plan


Assessment: 


He remains depressed, hopeless and helpless and continues to express suicidal 

thoughts.  He is minimally improved from admission.


Plan: 


Continue inpatient treatment.  Safety precautions.  Obtain a EKG. Taper then 

discontinue BuSpar.  Taper and discontinue the Luvox.  Continue clomipramine 25 

mg BID and titrated according to clinical response and tolerance.  Continue 

clozapine 150 mg at bedtime, glycopyrrolate 2 mg by mouth twice a day, Lamictal 

150 mg twice a day, lithium carbonate 1200 mg at bedtime and Ativan 0.5 mg twice

a day.  Continue Ativan 0.5 mg IM twice a day when necessary for agitation or 

anxiety.   to coordinate discharge and aftercare services with 

[facility] mental health.  Encourage participation in therapeutic groups and 

activities.  Evaluate clinical status response to treatment daily basis.

## 2020-02-05 LAB
GLUCOSE BLD-MCNC: 143 MG/DL (ref 75–99)
GLUCOSE BLD-MCNC: 81 MG/DL (ref 75–99)
GLUCOSE BLD-MCNC: 83 MG/DL (ref 75–99)
GLUCOSE BLD-MCNC: 96 MG/DL (ref 75–99)

## 2020-02-05 RX ADMIN — HYDROCORTISONE SCH APPLIC: 1 CREAM TOPICAL at 22:21

## 2020-02-05 RX ADMIN — INSULIN ASPART SCH: 100 INJECTION, SOLUTION INTRAVENOUS; SUBCUTANEOUS at 09:27

## 2020-02-05 RX ADMIN — INSULIN ASPART SCH UNIT: 100 INJECTION, SOLUTION INTRAVENOUS; SUBCUTANEOUS at 22:01

## 2020-02-05 RX ADMIN — LISINOPRIL SCH MG: 20 TABLET ORAL at 09:36

## 2020-02-05 RX ADMIN — GLYCOPYRROLATE SCH MG: 1 TABLET ORAL at 09:29

## 2020-02-05 RX ADMIN — LORATADINE SCH MG: 10 TABLET ORAL at 09:36

## 2020-02-05 RX ADMIN — INSULIN ASPART SCH: 100 INJECTION, SOLUTION INTRAVENOUS; SUBCUTANEOUS at 12:53

## 2020-02-05 RX ADMIN — PROPRANOLOL HYDROCHLORIDE SCH MG: 80 CAPSULE, EXTENDED RELEASE ORAL at 09:30

## 2020-02-05 RX ADMIN — LEVOTHYROXINE SODIUM SCH MCG: 50 TABLET ORAL at 06:12

## 2020-02-05 RX ADMIN — HYDROCORTISONE SCH: 1 CREAM TOPICAL at 20:08

## 2020-02-05 RX ADMIN — ATORVASTATIN CALCIUM SCH MG: 10 TABLET, FILM COATED ORAL at 22:03

## 2020-02-05 RX ADMIN — INSULIN ASPART SCH: 100 INJECTION, SOLUTION INTRAVENOUS; SUBCUTANEOUS at 17:44

## 2020-02-05 RX ADMIN — GLYCOPYRROLATE SCH MG: 1 TABLET ORAL at 22:03

## 2020-02-05 RX ADMIN — LITHIUM CARBONATE SCH MG: 300 CAPSULE, GELATIN COATED ORAL at 22:03

## 2020-02-05 RX ADMIN — HYDROCORTISONE SCH APPLIC: 1 CREAM TOPICAL at 09:29

## 2020-02-05 RX ADMIN — LINAGLIPTIN SCH MG: 5 TABLET, FILM COATED ORAL at 09:29

## 2020-02-05 RX ADMIN — HYDROCORTISONE SCH: 1 CREAM TOPICAL at 13:57

## 2020-02-05 NOTE — P.PN
Subjective


Progress Note Date: 02/05/20


Principal diagnosis: 


Schizoaffective disorder depressed type, obsessive-compulsive disorder, alcohol 

use disorder sustained remission





I reviewed the medical record, interviewed the patient and discussed his 

treatment and treatment plan during team meeting.  He continues to complain of 

depression and suicidal ideation.  He let she has both passive and active 

suicidal thoughts.  The active suicidal thoughts are intrusions of the words "I 

want to kill myself."  He perseverated about his living situation and stated 

that he will not return to his apartment.  He denied side effects to current 

medication.





Objective





- Vital Signs


Vital signs: 


                                   Vital Signs











Temp  98.0 F   02/05/20 06:45


 


Pulse  67   02/05/20 06:45


 


Resp  16   02/05/20 06:45


 


BP  130/86   02/05/20 06:45


 


Pulse Ox  97   02/04/20 06:00








                                 Intake & Output











 02/04/20 02/05/20 02/05/20





 18:59 06:59 18:59


 


Weight 87.3 kg  














- Exam


He presented as an unshaven 54-year-old male with loose pants.  He had a depress

ed facial expression that was  and expressive.  Her speech and 

movements were slow His speech was soft, slow and halting.  His affect was 

depressed and minimally reactive.  He describes suicidal thoughts without 

specific intent or plan.  He denied homicidality.  He did not express obsession

al thoughts, paranoia or delusional thoughts.  He denied hallucinations and did 

not appear to be responding to internal stimuli.








- Labs


CBC & Chem 7: 


                                 01/29/20 09:32





                                 02/03/20 12:26


Labs: 


                  Abnormal Lab Results - Last 24 Hours (Table)











  02/04/20 Range/Units





  20:08 


 


POC Glucose (mg/dL)  114 H  (75-99)  mg/dL














Assessment and Plan


Assessment: 


He remains depressed, hopeless and helpless and continues to express suicidal 

thoughts.  He is minimally improved from admission.


Plan: 


Continue inpatient treatment.  Safety precautions.  Obtain a EKG. Taper then di

scontinue BuSpar.  Taper and discontinue the Luvox.  Continue clomipramine 25 mg

BID and titrated according to clinical response and tolerance.  Continue 

clozapine 150 mg at bedtime, glycopyrrolate 2 mg by mouth twice a day, Lamictal 

150 mg twice a day, lithium carbonate 1200 mg at bedtime and Ativan 0.5 mg twice

a day.  Continue Ativan 0.5 mg IM twice a day when necessary for agitation or 

anxiety.   to coordinate discharge and aftercare services with 

community mental health.  Encourage participation in therapeutic groups and 

activities.  Evaluate clinical status response to treatment daily basis.

## 2020-02-06 LAB
GLUCOSE BLD-MCNC: 106 MG/DL (ref 75–99)
GLUCOSE BLD-MCNC: 122 MG/DL (ref 75–99)
GLUCOSE BLD-MCNC: 77 MG/DL (ref 75–99)
GLUCOSE BLD-MCNC: 95 MG/DL (ref 75–99)

## 2020-02-06 RX ADMIN — GLYCOPYRROLATE SCH MG: 1 TABLET ORAL at 21:05

## 2020-02-06 RX ADMIN — HYDROCORTISONE SCH: 1 CREAM TOPICAL at 18:10

## 2020-02-06 RX ADMIN — PROPRANOLOL HYDROCHLORIDE SCH MG: 80 CAPSULE, EXTENDED RELEASE ORAL at 09:30

## 2020-02-06 RX ADMIN — INSULIN ASPART SCH: 100 INJECTION, SOLUTION INTRAVENOUS; SUBCUTANEOUS at 07:49

## 2020-02-06 RX ADMIN — LINAGLIPTIN SCH MG: 5 TABLET, FILM COATED ORAL at 09:07

## 2020-02-06 RX ADMIN — LORATADINE SCH MG: 10 TABLET ORAL at 09:08

## 2020-02-06 RX ADMIN — INSULIN ASPART SCH: 100 INJECTION, SOLUTION INTRAVENOUS; SUBCUTANEOUS at 17:59

## 2020-02-06 RX ADMIN — INSULIN ASPART SCH: 100 INJECTION, SOLUTION INTRAVENOUS; SUBCUTANEOUS at 21:06

## 2020-02-06 RX ADMIN — HYDROCORTISONE SCH: 1 CREAM TOPICAL at 21:32

## 2020-02-06 RX ADMIN — INSULIN ASPART SCH: 100 INJECTION, SOLUTION INTRAVENOUS; SUBCUTANEOUS at 12:54

## 2020-02-06 RX ADMIN — HYDROCORTISONE SCH APPLIC: 1 CREAM TOPICAL at 09:06

## 2020-02-06 RX ADMIN — LITHIUM CARBONATE SCH MG: 300 CAPSULE, GELATIN COATED ORAL at 21:04

## 2020-02-06 RX ADMIN — LEVOTHYROXINE SODIUM SCH MCG: 50 TABLET ORAL at 05:40

## 2020-02-06 RX ADMIN — ATORVASTATIN CALCIUM SCH MG: 10 TABLET, FILM COATED ORAL at 21:02

## 2020-02-06 RX ADMIN — GLYCOPYRROLATE SCH MG: 1 TABLET ORAL at 09:06

## 2020-02-06 RX ADMIN — LISINOPRIL SCH MG: 20 TABLET ORAL at 09:08

## 2020-02-06 RX ADMIN — HYDROCORTISONE SCH: 1 CREAM TOPICAL at 14:11

## 2020-02-06 NOTE — P.PN
Subjective


Progress Note Date: 02/06/20


Principal diagnosis: 


Schizoaffective disorder depressed type, obsessive-compulsive disorder, alcohol 

use disorder sustained remission





I reviewed the medical record, interviewed the patient and discussed his 

treatment and treatment plan during team meeting.  He continues to complain of 

depression and suicidal thoughts.  He described passive thoughts that injured 

into his mind such as "I wish were dead" or "logical yourself."   He again 

tperseverated about his living situation and stated that he will not return to 

his apartment.  He feels sedated.





Objective





- Vital Signs


Vital signs: 


                                   Vital Signs











Temp  98.3 F   02/06/20 06:05


 


Pulse  73   02/06/20 09:14


 


Resp  16   02/06/20 06:05


 


BP  132/74   02/06/20 06:05


 


Pulse Ox  98   02/06/20 09:14














- Exam


He presented as an sad, unshaven 54-year-old male with loose pants.  He had a 

depressed facial expression.  Her speech and movements were slow. His speech was

soft, slow and halting.  His affect was depressed and minimally reactive.  He 

describes suicidal thoughts without specific intent or plan.  He denied 

homicidality.  He did not express obsessional thoughts, paranoia or delusional 

thoughts.  He talked about hearing voices but did not appear to be responding to

internal stimuli.








- Labs


CBC & Chem 7: 


                                 01/29/20 09:32





                                 02/03/20 12:26


Labs: 


                  Abnormal Lab Results - Last 24 Hours (Table)











  02/05/20 02/06/20 Range/Units





  19:40 12:44 


 


POC Glucose (mg/dL)  143 H  106 H  (75-99)  mg/dL














Assessment and Plan


Assessment: 


He remains depressed, hopeless and helpless and continues to express suicidal 

thoughts.  He is minimally improved from admission.


Plan: 


Continue inpatient treatment.  Safety precautions.  Taper then discontinue 

BuSpar.  Taper and discontinue the Luvox. Taper and discontinue Ativan. Continue

clomipramine 25 mg BID and titrated according to clinical response and 

tolerance.  Continue clozapine 150 mg at bedtime, glycopyrrolate 2 mg by mouth 

twice a day, Lamictal 150 mg twice a day, lithium carbonate 1200 mg at bedtime 

and Ativan 0.5 mg twice a day.  Continue Ativan 0.5 mg IM twice a day when 

necessary for agitation or anxiety.   to coordinate discharge and 

aftercare services with community mental health.  Encourage participation in 

therapeutic groups and activities.  Evaluate clinical status response to 

treatment daily basis.

## 2020-02-07 LAB — GLUCOSE BLD-MCNC: 101 MG/DL (ref 75–99)

## 2020-02-07 RX ADMIN — LINAGLIPTIN SCH MG: 5 TABLET, FILM COATED ORAL at 09:01

## 2020-02-07 RX ADMIN — HYDROCORTISONE SCH: 1 CREAM TOPICAL at 09:04

## 2020-02-07 RX ADMIN — GLYCOPYRROLATE SCH MG: 1 TABLET ORAL at 09:02

## 2020-02-07 RX ADMIN — LEVOTHYROXINE SODIUM SCH MCG: 50 TABLET ORAL at 05:52

## 2020-02-07 RX ADMIN — HYDROCORTISONE SCH: 1 CREAM TOPICAL at 12:47

## 2020-02-07 RX ADMIN — LITHIUM CARBONATE SCH MG: 300 CAPSULE, GELATIN COATED ORAL at 20:54

## 2020-02-07 RX ADMIN — INSULIN ASPART SCH: 100 INJECTION, SOLUTION INTRAVENOUS; SUBCUTANEOUS at 08:08

## 2020-02-07 RX ADMIN — PROPRANOLOL HYDROCHLORIDE SCH MG: 80 CAPSULE, EXTENDED RELEASE ORAL at 09:03

## 2020-02-07 RX ADMIN — GLYCOPYRROLATE SCH MG: 1 TABLET ORAL at 20:55

## 2020-02-07 RX ADMIN — ATORVASTATIN CALCIUM SCH MG: 10 TABLET, FILM COATED ORAL at 20:54

## 2020-02-07 RX ADMIN — Medication PRN APPLIC: at 22:31

## 2020-02-07 RX ADMIN — LORATADINE SCH MG: 10 TABLET ORAL at 09:03

## 2020-02-07 RX ADMIN — LISINOPRIL SCH MG: 20 TABLET ORAL at 09:03

## 2020-02-07 RX ADMIN — HYDROCORTISONE SCH: 1 CREAM TOPICAL at 20:54

## 2020-02-07 RX ADMIN — HYDROCORTISONE SCH APPLIC: 1 CREAM TOPICAL at 20:57

## 2020-02-08 RX ADMIN — GLYCOPYRROLATE SCH MG: 1 TABLET ORAL at 08:50

## 2020-02-08 RX ADMIN — PROPRANOLOL HYDROCHLORIDE SCH MG: 80 CAPSULE, EXTENDED RELEASE ORAL at 08:51

## 2020-02-08 RX ADMIN — LEVOTHYROXINE SODIUM SCH MCG: 50 TABLET ORAL at 07:23

## 2020-02-08 RX ADMIN — ATORVASTATIN CALCIUM SCH MG: 10 TABLET, FILM COATED ORAL at 21:07

## 2020-02-08 RX ADMIN — HYDROCORTISONE SCH: 1 CREAM TOPICAL at 08:49

## 2020-02-08 RX ADMIN — LITHIUM CARBONATE SCH MG: 300 CAPSULE, GELATIN COATED ORAL at 21:04

## 2020-02-08 RX ADMIN — LINAGLIPTIN SCH MG: 5 TABLET, FILM COATED ORAL at 08:49

## 2020-02-08 RX ADMIN — LISINOPRIL SCH MG: 20 TABLET ORAL at 08:50

## 2020-02-08 RX ADMIN — PANTOPRAZOLE SODIUM PRN MG: 40 TABLET, DELAYED RELEASE ORAL at 09:54

## 2020-02-08 RX ADMIN — HYDROCORTISONE SCH APPLIC: 1 CREAM TOPICAL at 13:31

## 2020-02-08 RX ADMIN — GLYCOPYRROLATE SCH MG: 1 TABLET ORAL at 21:07

## 2020-02-08 RX ADMIN — HYDROCORTISONE SCH: 1 CREAM TOPICAL at 21:08

## 2020-02-08 RX ADMIN — LORATADINE SCH MG: 10 TABLET ORAL at 08:50

## 2020-02-08 RX ADMIN — HYDROCORTISONE SCH: 1 CREAM TOPICAL at 16:50

## 2020-02-08 RX ADMIN — PANTOPRAZOLE SODIUM PRN MG: 40 TABLET, DELAYED RELEASE ORAL at 00:09

## 2020-02-08 NOTE — P.PN
Subjective


Progress Note Date: 02/07/20


Principal diagnosis: 


Schizoaffective disorder depressed type, obsessive-compulsive disorder, alcohol 

use disorder sustained remission





I reviewed the medical record, interviewed the patient and discussed his 

treatment and treatment plan during team meeting.  He stated that he is less 

sedated today.  He complains of continued feelings depression and passive 

suicidal thoughts.  He alleged that he has been depressed continuously since he 

was an adolescent with no more been brief periods of normal mood.  We again 

discussed the medication changes and he again asked me to "write them down".  He

perseverated and ongoing theme that he cannot return to his apartment and will 

need alternate living arrangements.  He expressed an interest in [facility] if 

there are no other options rather than returning to his apartment.





Objective





- Vital Signs


Vital signs: 


                                   Vital Signs











Temp  98.5 F   02/07/20 06:02


 


Pulse  77   02/07/20 09:00


 


Resp  16   02/07/20 06:02


 


BP  114/74   02/07/20 09:00


 


Pulse Ox  96   02/06/20 14:28














- Exam


He was casually dressed and groomed.  He walks slowly with a slouched gait.  He 

appeared less sedated and more alert than in previous encounters.  He is sad 

facial expression.  His affect was depressed and not reactive.  He talked about 

experiencing auditory hallucinations but did not appear to be responding to 

internal stimuli.  His thinking was abstract and goal directed.








- Labs


CBC & Chem 7: 


                                 01/29/20 09:32





                                 02/03/20 12:26


Labs: 


                  Abnormal Lab Results - Last 24 Hours (Table)











  02/06/20 02/06/20 02/07/20 Range/Units





  12:44 20:16 07:49 


 


POC Glucose (mg/dL)  106 H  122 H  101 H  (75-99)  mg/dL














Assessment and Plan


Assessment: 


He is less sedated and his affect appears a bit more reactive.  He remains 

distressed about his living situation and will not return to his apartment.


Plan: 


Continue inpatient treatment.  Safety precautions.  Taper then discontinue 

BuSpar, Luvox and Ativan. Continue clomipramine 25 mg BID and titrated according

to clinical response and tolerance.  Continue clozapine 150 mg at bedtime, 

glycopyrrolate 2 mg by mouth twice a day, Lamictal 150 mg twice a day, lithium 

carbonate 1200 mg at bedtime.  Continue Ativan 0.5 mg IM twice a day when 

necessary for agitation or anxiety.   to coordinate discharge and 

aftercare services with community mental health.  Encourage participation in 

therapeutic groups and activities.  Evaluate clinical status response to 

treatment daily basis.

## 2020-02-08 NOTE — P.PN
Subjective


Progress Note Date: 02/08/20


Principal diagnosis: 


Schizoaffective disorder depressed type, obsessive-compulsive disorder, alcohol 

use disorder sustained remission





I reviewed the medical record and interviewed the patient.  He feels "someone" 

better but continues to have "occasional" thoughts of suicide.  His hands were 

red and  he admitted to frequent handwashing.  He was unable to quantify the 

frequency of his handwashing but feels compelled to wash anytime he feels that 

they are contaminated with "germs".  After our interaction he came to my office 

acutely distressed and repeatedly asked me if I was angry at them.  Even after I

explained that I am not angry with him he asked a question over and over again.








Objective





- Vital Signs


Vital signs: 


                                   Vital Signs











Temp  98.6 F   02/08/20 08:45


 


Pulse  75   02/08/20 08:45


 


Resp  16   02/08/20 05:57


 


BP  116/70   02/08/20 08:45


 


Pulse Ox  96   02/06/20 14:28














- Exam


She was casually groomed, pleasant and cooperative.  He made eye contact and 

attended the interview.  He had a sad facial expression.  He walks slowly with a

slouched gait.  His movements were slow.  Her hands were red.  He is obsessed 

over our conversation.  He complained of continued "voices" but did not appear 

to be responding to internal stimuli.  








- Labs


CBC & Chem 7: 


                                 01/29/20 09:32





                                 02/03/20 12:26





Assessment and Plan


Assessment: 


He appears less depressed but continues to report suicidal ideation and "voices"

(I'm unsure whether his description of the "voices" are true auditory 

hallucinations).  He has excessive handwashing and obsessive doubting.





Plan: 


Continue to taper Luvox, Ativan or BuSpar.  Titrate clomipramine to 50 mg twice 

a day.  Continue other medications as prescribed.  Nursing to encourage him to 

use moisturizing cream on his hands.

## 2020-02-09 RX ADMIN — LISINOPRIL SCH: 20 TABLET ORAL at 09:20

## 2020-02-09 RX ADMIN — HYDROCORTISONE SCH: 1 CREAM TOPICAL at 09:20

## 2020-02-09 RX ADMIN — ATORVASTATIN CALCIUM SCH MG: 10 TABLET, FILM COATED ORAL at 21:56

## 2020-02-09 RX ADMIN — HYDROCORTISONE SCH: 1 CREAM TOPICAL at 13:00

## 2020-02-09 RX ADMIN — LITHIUM CARBONATE SCH MG: 300 CAPSULE, GELATIN COATED ORAL at 21:53

## 2020-02-09 RX ADMIN — HYDROCORTISONE SCH: 1 CREAM TOPICAL at 20:25

## 2020-02-09 RX ADMIN — GLYCOPYRROLATE SCH MG: 1 TABLET ORAL at 21:56

## 2020-02-09 RX ADMIN — LORATADINE SCH MG: 10 TABLET ORAL at 09:18

## 2020-02-09 RX ADMIN — ACETAMINOPHEN PRN ML: 160 SOLUTION ORAL at 02:13

## 2020-02-09 RX ADMIN — PROPRANOLOL HYDROCHLORIDE SCH: 80 CAPSULE, EXTENDED RELEASE ORAL at 09:20

## 2020-02-09 RX ADMIN — LINAGLIPTIN SCH MG: 5 TABLET, FILM COATED ORAL at 09:18

## 2020-02-09 RX ADMIN — LEVOTHYROXINE SODIUM SCH MCG: 50 TABLET ORAL at 07:24

## 2020-02-09 RX ADMIN — HYDROCORTISONE SCH: 1 CREAM TOPICAL at 20:29

## 2020-02-09 RX ADMIN — GLYCOPYRROLATE SCH MG: 1 TABLET ORAL at 09:17

## 2020-02-09 NOTE — P.PN
Subjective


Progress Note Date: 02/09/20


Principal diagnosis: 


Schizoaffective disorder depressed type, obsessive-compulsive disorder, alcohol 

use disorder sustained remission





I reviewed the medical record and interviewed the patient.  He is feeling "a 

little better" but that he is still depressed.  He has "occasional" thoughts of 

suicide.  He talked about a "voice in my head" that maybe his own thoughts or 

might be of thoughts of his stepfather.  He remains preoccupied with his living 

situation.  He described intermittent jerking his hands that began after he 

started clozapine.  Denied other side effects to his medication.  He denied 

increased sedation since we increased the Anafranil.








Objective





- Vital Signs


Vital signs: 


                                   Vital Signs











Temp  99.0 F   02/09/20 02:14


 


Pulse  82   02/09/20 12:30


 


Resp  18   02/09/20 12:30


 


BP  121/74   02/09/20 12:30


 


Pulse Ox  95   02/09/20 12:30








                                 Intake & Output











 02/08/20 02/09/20 02/09/20





 18:59 06:59 18:59


 


Weight   88.9 kg














- Exam


She was casually groomed, pleasant and cooperative.  He made eye contact and 

attended the interview.  He had a blunted facial expression but he appeared more

expressive than on prior encounters.  He walks slowly with a slouched gait.  His

movements were slow.  Her hands were red.  He did not appear to be responding to

internal stimuli.








- Labs


CBC & Chem 7: 


                                 01/29/20 09:32





                                 02/03/20 12:26





Assessment and Plan


Assessment: 


She appears less depressed and internally preoccupied.  His hands were made 

without from excessive handwashing.  He continues to report suicidal ideation.





Plan: 


Continue to taper Luvox, Ativan or BuSpar.  Titrate clomipramine to 50 mg twice 

a day.  Continue other medications as prescribed.  Nursing to encourage him to 

use moisturizing cream on his hands.

## 2020-02-10 RX ADMIN — ATORVASTATIN CALCIUM SCH MG: 10 TABLET, FILM COATED ORAL at 21:07

## 2020-02-10 RX ADMIN — LISINOPRIL SCH MG: 20 TABLET ORAL at 09:18

## 2020-02-10 RX ADMIN — LEVOTHYROXINE SODIUM SCH MCG: 50 TABLET ORAL at 09:16

## 2020-02-10 RX ADMIN — HYDROCORTISONE SCH: 1 CREAM TOPICAL at 14:25

## 2020-02-10 RX ADMIN — LORATADINE SCH MG: 10 TABLET ORAL at 09:18

## 2020-02-10 RX ADMIN — GLYCOPYRROLATE SCH MG: 1 TABLET ORAL at 21:09

## 2020-02-10 RX ADMIN — HYDROCORTISONE SCH: 1 CREAM TOPICAL at 21:06

## 2020-02-10 RX ADMIN — HYDROCORTISONE SCH APPLIC: 1 CREAM TOPICAL at 09:17

## 2020-02-10 RX ADMIN — LITHIUM CARBONATE SCH MG: 300 CAPSULE, GELATIN COATED ORAL at 21:07

## 2020-02-10 RX ADMIN — PROPRANOLOL HYDROCHLORIDE SCH MG: 80 CAPSULE, EXTENDED RELEASE ORAL at 09:19

## 2020-02-10 RX ADMIN — LINAGLIPTIN SCH MG: 5 TABLET, FILM COATED ORAL at 09:18

## 2020-02-10 RX ADMIN — HYDROCORTISONE SCH APPLIC: 1 CREAM TOPICAL at 21:06

## 2020-02-10 RX ADMIN — GLYCOPYRROLATE SCH MG: 1 TABLET ORAL at 09:17

## 2020-02-10 NOTE — P.PN
Subjective


Progress Note Date: 02/10/20


Principal diagnosis: 


Schizoaffective disorder depressed type, obsessive-compulsive disorder, alcohol 

use disorder sustained remission





I reviewed the medical, interviewed the patient and discuss his treatment and 

treatment plan during team meeting.  He reports feeling "a little bit" better 

but alleged that he continues have "occasional" thoughts of suicide.  He remains

preoccupied with discharge and again stated that he will not return to his 

apartment.  He is more than willing to accept room and board situation.





The [facility] liaison reported that he will lose his section 8 housing and 

worthlessness apartment.  His  is stacia and organization to 

"clean out" the apartment.








Objective





- Vital Signs


Vital signs: 


                                   Vital Signs











Temp  98.4 F   02/10/20 06:40


 


Pulse  73   02/10/20 06:40


 


Resp  18   02/10/20 06:40


 


BP  125/73   02/10/20 06:40


 


Pulse Ox  93 L  02/10/20 06:40








                                 Intake & Output











 02/09/20 02/10/20 02/10/20





 18:59 06:59 18:59


 


Weight 88.9 kg  














- Exam


He presented as a casually groomed 54-year-old  male who was pleasant 

on approach.  He made eye contact and attended to the interview.  He had a 

blunted facial expression.  He is alert and oriented to person, place and time. 

He showed psychomotor retardation but no abnormal movements.  Her speech was 

slow with decreased volume and rhythm.  His affect was depressed but not intense

and appropriate.  He describes suicidal ideation and death wishes.  He denied 

homicidal ideation.  He expressed continued feelings of hopelessness, 

helplessness and worthlessness.  He ruminated about his living situation.  He 

did not express ideas reference, paranoid ideation or delusions.  His thinking 

was concrete and associations were coherent and logical.  He alleged that he is 

"hearing voices" but did not appear to be responding to internal stimuli.








- Labs


CBC & Chem 7: 


                                 01/29/20 09:32





                                 02/03/20 12:26





Assessment and Plan


Assessment: 


Overall he appears much improved from admission but continues she reported 

feelings depression, suicidal ideation and auditory hallucinations.  He remains 

preoccupied about his living situation





Plan: 


Discontinue BuSpar today.  Increase clomipramine to 50 mg by mouth twice a day. 

Continue other medications as written.   presenting with [facility] 

regarding discharge and aftercare services.

## 2020-02-11 RX ADMIN — HYDROCORTISONE SCH APPLIC: 1 CREAM TOPICAL at 09:01

## 2020-02-11 RX ADMIN — LEVOTHYROXINE SODIUM SCH MCG: 50 TABLET ORAL at 07:02

## 2020-02-11 RX ADMIN — LISINOPRIL SCH MG: 20 TABLET ORAL at 09:01

## 2020-02-11 RX ADMIN — HYDROCORTISONE SCH APPLIC: 1 CREAM TOPICAL at 21:25

## 2020-02-11 RX ADMIN — GLYCOPYRROLATE SCH MG: 1 TABLET ORAL at 09:01

## 2020-02-11 RX ADMIN — ATORVASTATIN CALCIUM SCH MG: 10 TABLET, FILM COATED ORAL at 21:23

## 2020-02-11 RX ADMIN — GLYCOPYRROLATE SCH MG: 1 TABLET ORAL at 21:24

## 2020-02-11 RX ADMIN — HYDROCORTISONE SCH: 1 CREAM TOPICAL at 21:24

## 2020-02-11 RX ADMIN — PROPRANOLOL HYDROCHLORIDE SCH MG: 80 CAPSULE, EXTENDED RELEASE ORAL at 09:01

## 2020-02-11 RX ADMIN — LORATADINE SCH MG: 10 TABLET ORAL at 08:59

## 2020-02-11 RX ADMIN — HYDROCORTISONE SCH: 1 CREAM TOPICAL at 12:27

## 2020-02-11 RX ADMIN — LINAGLIPTIN SCH MG: 5 TABLET, FILM COATED ORAL at 08:59

## 2020-02-11 RX ADMIN — LITHIUM CARBONATE SCH MG: 300 CAPSULE, GELATIN COATED ORAL at 21:21

## 2020-02-11 NOTE — P.PN
Subjective


Progress Note Date: 02/11/20


Principal diagnosis: 


Schizoaffective disorder depressed type, obsessive-compulsive disorder, alcohol 

use disorder sustained remission





I reviewed the medical record, interviewed the patient and discuss his treatment

and treatment plan during team meeting.  He feels feels but is not ready for 

discharge.  He perseverated about his living situation and continues to maintain

that he cannot return to his apartment.  If he does not return to his apartment 

his options are limited to a weekly room and board because there are wait lists 

for group homes.  He has not "recently" experienced thoughts of death or 

suicide.








Objective





- Vital Signs


Vital signs: 


                                   Vital Signs











Temp  98.3 F   02/11/20 07:05


 


Pulse  100   02/11/20 08:55


 


Resp  16   02/11/20 08:55


 


BP  142/88   02/11/20 08:55


 


Pulse Ox  93 L  02/10/20 06:40








                                 Intake & Output











 02/10/20 02/11/20 02/11/20





 18:59 06:59 18:59


 


Weight 88.9 kg  














- Exam


He is casually dressed and groomed 54-year-old male with a thick but shortcut ..

 He made eye contact and attended to the interview.  He smiled during the 

interview.  He continues demonstrates psychomotor retardation.  Her speech was 

spontaneous but slow with decreased rhythm and volume.  His affect was depressed

but reactive.  He denied suicidal ideation or death wishes.  He continues to 

feel hopeless and helpless about his living situation but denied persistent 

feelings of worthlessness.  He ruminated about his living situation.  He did not

express ideas reference, paranoid ideation or delusions.  His thinking was 

abstract and associations were coherent and logical.  He denied hallucinations 

and did not appear to be responding to internal stimuli








- Labs


CBC & Chem 7: 


                                 01/29/20 09:32





                                 02/03/20 12:26





Assessment and Plan


Assessment: 


He is chronically and persistently mentally ill and much improved from 

admission.  He is much less depressed and hopeless. 





Plan: 


Continue current medications including Anafranil 50 mg by mouth twice a day.    

 collaborated with [facility] regarding discharge and aftercare services. 

Plan is to discharge him once we find suitable housing.

## 2020-02-12 LAB
BASOPHILS # BLD AUTO: 0 K/UL (ref 0–0.2)
BASOPHILS NFR BLD AUTO: 0 %
EOSINOPHIL # BLD AUTO: 0 K/UL (ref 0–0.7)
EOSINOPHIL NFR BLD AUTO: 0 %
ERYTHROCYTE [DISTWIDTH] IN BLOOD BY AUTOMATED COUNT: 4.56 M/UL (ref 4.3–5.9)
ERYTHROCYTE [DISTWIDTH] IN BLOOD: 13 % (ref 11.5–15.5)
HCT VFR BLD AUTO: 40 % (ref 39–53)
HGB BLD-MCNC: 13.7 GM/DL (ref 13–17.5)
LYMPHOCYTES # SPEC AUTO: 1.5 K/UL (ref 1–4.8)
LYMPHOCYTES NFR SPEC AUTO: 15 %
MCH RBC QN AUTO: 30 PG (ref 25–35)
MCHC RBC AUTO-ENTMCNC: 34.2 G/DL (ref 31–37)
MCV RBC AUTO: 87.8 FL (ref 80–100)
MONOCYTES # BLD AUTO: 0.5 K/UL (ref 0–1)
MONOCYTES NFR BLD AUTO: 5 %
NEUTROPHILS # BLD AUTO: 7.8 K/UL (ref 1.3–7.7)
NEUTROPHILS NFR BLD AUTO: 79 %
PLATELET # BLD AUTO: 231 K/UL (ref 150–450)
WBC # BLD AUTO: 10 K/UL (ref 3.8–10.6)

## 2020-02-12 RX ADMIN — LISINOPRIL SCH MG: 20 TABLET ORAL at 08:50

## 2020-02-12 RX ADMIN — LITHIUM CARBONATE SCH MG: 300 CAPSULE, GELATIN COATED ORAL at 21:22

## 2020-02-12 RX ADMIN — HYDROCORTISONE SCH: 1 CREAM TOPICAL at 16:18

## 2020-02-12 RX ADMIN — PROPRANOLOL HYDROCHLORIDE SCH MG: 80 CAPSULE, EXTENDED RELEASE ORAL at 08:50

## 2020-02-12 RX ADMIN — ACETAMINOPHEN PRN ML: 160 SOLUTION ORAL at 00:18

## 2020-02-12 RX ADMIN — HYDROCORTISONE SCH: 1 CREAM TOPICAL at 21:24

## 2020-02-12 RX ADMIN — LORATADINE SCH MG: 10 TABLET ORAL at 08:50

## 2020-02-12 RX ADMIN — GLYCOPYRROLATE SCH MG: 1 TABLET ORAL at 08:50

## 2020-02-12 RX ADMIN — HYDROCORTISONE SCH: 1 CREAM TOPICAL at 08:51

## 2020-02-12 RX ADMIN — LEVOTHYROXINE SODIUM SCH MCG: 50 TABLET ORAL at 06:14

## 2020-02-12 RX ADMIN — HYDROCORTISONE SCH APPLIC: 1 CREAM TOPICAL at 13:27

## 2020-02-12 RX ADMIN — GLYCOPYRROLATE SCH MG: 1 TABLET ORAL at 21:21

## 2020-02-12 RX ADMIN — ATORVASTATIN CALCIUM SCH MG: 10 TABLET, FILM COATED ORAL at 21:22

## 2020-02-12 RX ADMIN — LINAGLIPTIN SCH MG: 5 TABLET, FILM COATED ORAL at 08:50

## 2020-02-12 NOTE — P.PN
Subjective


Progress Note Date: 02/12/20


Principal diagnosis: 


Schizoaffective disorder depressed type, obsessive-compulsive disorder, alcohol 

use disorder sustained remission





I reviewed the medical record, interviewed the patient and discuss his treatment

and treatment plan during team meeting.  The theme of the meeting was discharge 

and aftercare.  I highlighted the improvements in his mood, affect and behavior.

 However he does not feel that he is "ready" for discharge.  He became acutely 

distress when I suggested discharge this Friday.  He countered with the request 

to remain in the hospital until next Wednesday.  After a back and forth 

negotiation we agreed to a discharge no longer than Tuesday but probably on 

Monday.








Objective





- Vital Signs


Vital signs: 


                                   Vital Signs











Temp  97.7 F   02/12/20 00:19


 


Pulse  81   02/12/20 08:50


 


Resp  18   02/12/20 00:19


 


BP  111/70   02/12/20 08:50


 


Pulse Ox  97   02/11/20 16:11














- Exam


He was casually dressed and groomed.  He made eye contact and attended to the 

interview.  A blunted but bright facial expression.  His speech was spontaneous 

with normal rate, rhythm and volume.  His affect was blunted but expressive.  He

denied suicidal ideation, death wishes or homicidal ideation.  He expressed 

feelings of worthlessness but denied feeling hopeless or helpless.  He did not 

express ideas reference, paranoid ideation or delusions.  His thinking was 

abstract and associations were coherent and logical.  He denied hallucinations 

did not appear to be responding to internal stimuli.





- Labs


CBC & Chem 7: 


                                 02/12/20 07:55





                                 02/03/20 12:26


Labs: 


                  Abnormal Lab Results - Last 24 Hours (Table)











  02/12/20 Range/Units





  07:55 


 


Neutrophils #  7.8 H  (1.3-7.7)  k/uL














Assessment and Plan


Assessment: 


He is chronically and severely mentally ill and much improved from admission.  

He is resistant to discharge.





Plan: 


Continue current treatment and treatment plan.  He is working with 

regarding housing and is considering room and board as opposed to his apartment.

## 2020-02-13 RX ADMIN — ATORVASTATIN CALCIUM SCH MG: 10 TABLET, FILM COATED ORAL at 21:30

## 2020-02-13 RX ADMIN — HYDROCORTISONE SCH: 1 CREAM TOPICAL at 21:32

## 2020-02-13 RX ADMIN — LITHIUM CARBONATE SCH MG: 300 CAPSULE, GELATIN COATED ORAL at 21:29

## 2020-02-13 RX ADMIN — HYDROCORTISONE SCH: 1 CREAM TOPICAL at 09:16

## 2020-02-13 RX ADMIN — GLYCOPYRROLATE SCH MG: 1 TABLET ORAL at 09:15

## 2020-02-13 RX ADMIN — HYDROCORTISONE SCH: 1 CREAM TOPICAL at 12:53

## 2020-02-13 RX ADMIN — LISINOPRIL SCH MG: 20 TABLET ORAL at 09:16

## 2020-02-13 RX ADMIN — LORATADINE SCH MG: 10 TABLET ORAL at 09:16

## 2020-02-13 RX ADMIN — LEVOTHYROXINE SODIUM SCH MCG: 50 TABLET ORAL at 06:37

## 2020-02-13 RX ADMIN — GLYCOPYRROLATE SCH MG: 1 TABLET ORAL at 21:31

## 2020-02-13 RX ADMIN — LINAGLIPTIN SCH MG: 5 TABLET, FILM COATED ORAL at 09:15

## 2020-02-13 RX ADMIN — PROPRANOLOL HYDROCHLORIDE SCH MG: 80 CAPSULE, EXTENDED RELEASE ORAL at 09:15

## 2020-02-13 RX ADMIN — Medication PRN APPLIC: at 21:32

## 2020-02-13 NOTE — P.PN
Subjective


Progress Note Date: 02/13/20


Principal diagnosis: 


Schizoaffective disorder depressed type, obsessive-compulsive disorder, alcohol 

use disorder sustained remission





I reviewed the medical record, interviewed the patient and discuss his treatment

and treatment plan during team meeting.  We talked about discharge and 

aftercare.  He was aware that he has a lease for his apartment in that even if 

he were to move to a bored he would still be responsible for rent until he is 

able to "break the lease."  He conceded that he "may" have returned to his 

apartment until the end lease.  [facility] is offered him assistance with maintaining 

the apartment.  We again talked about the discharge date.  He wants to be 

discharge next week alleging that he is "not ready.  He was disappointed when I 

told him that he is doing so much better I could not justify keeping him in 

hospital that long.








Objective





- Vital Signs


Vital signs: 


                                   Vital Signs











Temp  97.8 F   02/13/20 07:07


 


Pulse  75   02/13/20 09:19


 


Resp  14   02/13/20 07:07


 


BP  128/94   02/13/20 09:19


 


Pulse Ox  97   02/11/20 16:11














- Exam


He was casually dressed and groomed.  He made eye contact and attended to the 

interview.  A blunted but bright facial expression.  His speech was spontaneous 

with normal rate, rhythm and volume.  His affect was blunted but expressive.  He

denied suicidal ideation, death wishes or homicidal ideation.  He expressed 

feelings of worthlessness but denied feeling hopeless or helpless.  He did not 

express ideas reference, paranoid ideation or delusions.  His thinking was 

abstract and associations were coherent and logical.  He denied hallucinations 

did not appear to be responding to internal stimuli.





- Labs


CBC & Chem 7: 


                                 02/12/20 07:55





                                 02/03/20 12:26





Assessment and Plan


Assessment: 


He is chronically and severely mentally ill and much improved from admission.  

He remains resistant to discharge.





Plan: 


Plan for discharge on 02/14/2020.  Continue current treatment and treatment 

plan.

## 2020-02-14 RX ADMIN — GLYCOPYRROLATE SCH MG: 1 TABLET ORAL at 09:28

## 2020-02-14 RX ADMIN — HYDROCORTISONE SCH APPLIC: 1 CREAM TOPICAL at 21:17

## 2020-02-14 RX ADMIN — LEVOTHYROXINE SODIUM SCH MCG: 50 TABLET ORAL at 07:05

## 2020-02-14 RX ADMIN — HYDROCORTISONE SCH APPLIC: 1 CREAM TOPICAL at 14:08

## 2020-02-14 RX ADMIN — LINAGLIPTIN SCH MG: 5 TABLET, FILM COATED ORAL at 09:29

## 2020-02-14 RX ADMIN — LORATADINE SCH MG: 10 TABLET ORAL at 09:29

## 2020-02-14 RX ADMIN — HYDROCORTISONE SCH APPLIC: 1 CREAM TOPICAL at 09:29

## 2020-02-14 RX ADMIN — ATORVASTATIN CALCIUM SCH MG: 10 TABLET, FILM COATED ORAL at 21:18

## 2020-02-14 RX ADMIN — LISINOPRIL SCH MG: 20 TABLET ORAL at 09:29

## 2020-02-14 RX ADMIN — PROPRANOLOL HYDROCHLORIDE SCH MG: 80 CAPSULE, EXTENDED RELEASE ORAL at 09:29

## 2020-02-14 RX ADMIN — LITHIUM CARBONATE SCH MG: 300 CAPSULE, GELATIN COATED ORAL at 21:18

## 2020-02-14 RX ADMIN — GLYCOPYRROLATE SCH MG: 1 TABLET ORAL at 21:17

## 2020-02-14 RX ADMIN — HYDROCORTISONE SCH: 1 CREAM TOPICAL at 21:17

## 2020-02-14 NOTE — P.PN
Subjective


Progress Note Date: 02/14/20


Principal diagnosis: 


Schizoaffective disorder depressed type, obsessive-compulsive disorder, alcohol 

use disorder sustained remission





I reviewed the medical record, interviewed the patient and discuss his treatment

and treatment plan during team meeting.  He was anxious following our 

conversation yesterday where I told him that I could not keep him in the 

hospital "much longer."  





He did not appear to remember our discussion of his income and  lease 

obligations.  I explained that even if he were to move into a room and board he 

would have to continue paying rent in his apartment until the end of lease.  His

rent is approximately $380 and would cost him $500 a month to live at 

[facility].  He would need $880 per rent plus utilities for his apartment. He 

acknowledged that he could not afford to pay for room and board and his 

apartment.  He agreed to return to his apartment.  I reminded him at [facility] was arranged for a biweekly cleaning service.








Objective





- Vital Signs


Vital signs: 


                                   Vital Signs











Temp  97.8 F   02/13/20 07:07


 


Pulse  82   02/14/20 09:30


 


Resp  20   02/14/20 09:30


 


BP  107/69   02/14/20 09:30


 


Pulse Ox  97   02/11/20 16:11














- Exam


He was casually dressed and groomed.  He made eye contact and attended to the 

interview.  A blunted but bright facial expression.  His speech was spontaneous 

with normal rate, rhythm and volume.  His affect was blunted but expressive.  He

denied suicidal ideation, death wishes or homicidal ideation.  He expressed 

feelings of worthlessness but denied feeling hopeless or helpless.  He did not 

express ideas reference, paranoid ideation or delusions.  His thinking was 

abstract and associations were coherent and logical.  He denied hallucinations 

did not appear to be responding to internal stimuli.





- Labs


CBC & Chem 7: 


                                 02/12/20 07:55





                                 02/03/20 12:26





Assessment and Plan


Assessment: 


He is much improved from admission but continues to perseverate over returning 

to his apartment but is beginning to recognize that he could not move until the 

end of his lease





Plan: 


Continue with current treatment and treatment plan.  Plan for discharge on 

2/17/2020

## 2020-02-15 RX ADMIN — LISINOPRIL SCH MG: 20 TABLET ORAL at 08:59

## 2020-02-15 RX ADMIN — LITHIUM CARBONATE SCH MG: 300 CAPSULE, GELATIN COATED ORAL at 21:14

## 2020-02-15 RX ADMIN — PROPRANOLOL HYDROCHLORIDE SCH MG: 80 CAPSULE, EXTENDED RELEASE ORAL at 08:59

## 2020-02-15 RX ADMIN — HYDROCORTISONE SCH APPLIC: 1 CREAM TOPICAL at 08:58

## 2020-02-15 RX ADMIN — GLYCOPYRROLATE SCH MG: 1 TABLET ORAL at 08:57

## 2020-02-15 RX ADMIN — HYDROCORTISONE SCH: 1 CREAM TOPICAL at 13:09

## 2020-02-15 RX ADMIN — LEVOTHYROXINE SODIUM SCH MCG: 50 TABLET ORAL at 05:51

## 2020-02-15 RX ADMIN — GLYCOPYRROLATE SCH MG: 1 TABLET ORAL at 21:14

## 2020-02-15 RX ADMIN — ATORVASTATIN CALCIUM SCH MG: 10 TABLET, FILM COATED ORAL at 21:14

## 2020-02-15 RX ADMIN — HYDROCORTISONE SCH: 1 CREAM TOPICAL at 21:16

## 2020-02-15 RX ADMIN — LINAGLIPTIN SCH MG: 5 TABLET, FILM COATED ORAL at 08:59

## 2020-02-15 RX ADMIN — LORATADINE SCH MG: 10 TABLET ORAL at 08:59

## 2020-02-15 NOTE — P.PN
Progress Note - Text


Progress Note Date: 02/15/20





Interval history: Patient was seen wandering the hallways and was directable and

agreeable to secured in the office.  Patient appears to be improving mildly in 

terms of his psychosis and is more directable today.  He was appropriately with 

writer and states that his mood has been improving.  He claims that "I'll always

hear the voices" however states that they have an improved with clozapine and 

being in the hospital.  Patient states that he is looking forward to discharge 

early next week.  He states that he slept well last night.  At this time patient

denies any suicidal or homicidal ideations intent or plan. Denies any visual 

hallucinations. Patient denies any side effects from the medications and has 

been compliant with meds. 





Mental status exam: 


General Appearance: Patient appears to be stated age is alert, pleasant, and 

cooperative.  Improving hygiene, improving eye contact.


Behavior: No agitated behavior. Patient is calm and directable


Speech: Patient's speech is fluent and nonpressured.  Monotone.


Mood/Affect: Mood is improving, affect is congruent and constricted. 


Suicidality/Homicidality:  Patient denies having any suicidal or homicidal 

ideation intent or plan.  


Perceptions: Patient denies any auditory or visual hallucinations.  


Though content/process: Poverty of content/speech.  More organized thought 

process.


Memory and concentration: AOX3, grossly intact for the purposes of this session


Judgment and insight: improving mildly





Assessment/Plan: Continue with current diagnosis. Patient continues to meet 

criteria for inpatient psychiatric admission for symptom stabilization and 

safety.Patient will be maintained on current psychotropic medication regimen.  

Monitor for medication compliance and for any psychotropic medication side 

effects.  Will continue to monitor ongoing response to treatment.

## 2020-02-16 VITALS — RESPIRATION RATE: 16 BRPM | TEMPERATURE: 98.6 F

## 2020-02-16 RX ADMIN — HYDROCORTISONE SCH APPLIC: 1 CREAM TOPICAL at 08:06

## 2020-02-16 RX ADMIN — LEVOTHYROXINE SODIUM SCH MCG: 50 TABLET ORAL at 06:26

## 2020-02-16 RX ADMIN — GLYCOPYRROLATE SCH MG: 1 TABLET ORAL at 08:05

## 2020-02-16 RX ADMIN — HYDROCORTISONE SCH: 1 CREAM TOPICAL at 13:16

## 2020-02-16 RX ADMIN — LORATADINE SCH MG: 10 TABLET ORAL at 08:06

## 2020-02-16 RX ADMIN — PROPRANOLOL HYDROCHLORIDE SCH MG: 80 CAPSULE, EXTENDED RELEASE ORAL at 08:06

## 2020-02-16 RX ADMIN — HYDROCORTISONE SCH: 1 CREAM TOPICAL at 21:17

## 2020-02-16 RX ADMIN — ATORVASTATIN CALCIUM SCH MG: 10 TABLET, FILM COATED ORAL at 21:16

## 2020-02-16 RX ADMIN — LITHIUM CARBONATE SCH MG: 300 CAPSULE, GELATIN COATED ORAL at 21:15

## 2020-02-16 RX ADMIN — LISINOPRIL SCH MG: 20 TABLET ORAL at 08:06

## 2020-02-16 RX ADMIN — GLYCOPYRROLATE SCH MG: 1 TABLET ORAL at 21:16

## 2020-02-16 RX ADMIN — HYDROCORTISONE SCH APPLIC: 1 CREAM TOPICAL at 21:17

## 2020-02-16 RX ADMIN — LINAGLIPTIN SCH MG: 5 TABLET, FILM COATED ORAL at 08:06

## 2020-02-16 NOTE — P.PN
Progress Note - Text


Progress Note Date: 02/16/20





Interval history: Patient was seen wandering the hallways, pacing and was 

directable and agreeable to speak to writer in the office.  Patient appears to 

be improving overall today and is more directable during conversation.  He was 

also more appropriate with writer and states that his mood has been improving 

and also claims that the voices have been improving as well.  Patient states 

that he will be discharged tomorrow and is preparing for that. He states that he

slept well last night and offers no complaints.  At this time patient denies any

suicidal or homicidal ideations intent or plan. Denies any visual 

hallucinations. Patient denies any side effects from the medications and has 

been compliant with meds. 





Mental status exam: 


General Appearance: Patient appears to be stated age is alert, pleasant, and 

cooperative.  Improving hygiene, improving eye contact.


Behavior: No agitated behavior. Patient is calm and directable


Speech: Patient's speech is fluent and nonpressured.  Monotone.


Mood/Affect: Mood is improving, affect is congruent and constricted. 


Suicidality/Homicidality:  Patient denies having any suicidal or homicidal 

ideation intent or plan.  


Perceptions: Patient denies any visual hallucinations.  Admits to ongoing 

auditory hallucinations.


Though content/process: Poverty of content/speech.  More organized thought 

process.


Memory and concentration: AOX3, grossly intact for the purposes of this session


Judgment and insight: improving mildly





Assessment/Plan: Continue with current diagnosis. Patient continues to meet 

criteria for inpatient psychiatric admission for symptom stabilization and 

safety.Patient will be maintained on current psychotropic medication regimen.  

Monitor for medication compliance and for any psychotropic medication side 

effects.  Will continue to monitor ongoing response to treatment.

## 2020-02-17 VITALS — DIASTOLIC BLOOD PRESSURE: 75 MMHG | HEART RATE: 77 BPM | SYSTOLIC BLOOD PRESSURE: 102 MMHG

## 2020-02-17 RX ADMIN — LISINOPRIL SCH MG: 20 TABLET ORAL at 08:50

## 2020-02-17 RX ADMIN — HYDROCORTISONE SCH: 1 CREAM TOPICAL at 08:55

## 2020-02-17 RX ADMIN — LINAGLIPTIN SCH MG: 5 TABLET, FILM COATED ORAL at 08:49

## 2020-02-17 RX ADMIN — LEVOTHYROXINE SODIUM SCH MCG: 50 TABLET ORAL at 06:36

## 2020-02-17 RX ADMIN — GLYCOPYRROLATE SCH MG: 1 TABLET ORAL at 08:48

## 2020-02-17 RX ADMIN — HYDROCORTISONE SCH: 1 CREAM TOPICAL at 13:21

## 2020-02-17 RX ADMIN — LORATADINE SCH MG: 10 TABLET ORAL at 08:49

## 2020-02-17 RX ADMIN — PROPRANOLOL HYDROCHLORIDE SCH MG: 80 CAPSULE, EXTENDED RELEASE ORAL at 08:50

## 2020-02-17 NOTE — P.DS
Providers


Date of admission: 


01/28/20 15:51





Attending physician: 


Tae Howe MD





Consults: 





                                        





01/28/20 16:13


Consult Physician Routine 


   Consulting Provider: Aleksey Brandt


   Consult Reason/Comments: H&P and medical


   Do you want consulting provider notified?: Yes











Primary care physician: 


Deep HESS Charbal








- Discharge Diagnosis(es)


(1) Depression


Current Visit: Yes   Status: Resolved   Priority: Medium   





(2) Suicidal ideation


Current Visit: Yes   Status: Resolved   Priority: Low   





(3) Obsessive compulsive disorder


Current Visit: No   Status: Chronic   Priority: High   





(4) Schizoaffective disorder, depressive type


Current Visit: No   Status: Chronic   Priority: High   





(5) Alcohol use disorder, severe, in sustained remission


Current Visit: No   Status: Chronic   Priority: Low   


Hospital Course: 


He is a 54-year-old single  male who has a history of a schizoaffective

disorder, and OCD.  He presented to the psychiatric unit voluntarily with 

complaints of increasing depression, inability to live in his apartment 

independently and increasing suicidal thoughts.





He is well known to this service from multiple prior admissions.  He was last 

discharged from this unit in December 2017 with a diagnosis of obsessive-

compulsive disorder, schizoaffective disorder depressed type and alcohol use 

disorder.  He is active with [facility].  He last 

met with his psychiatrist on 01/24/2020 during which he told the psychiatrist 

that his depression "seems to have lessened."  He complained that he was not 

keeping his apartment as clean as he would like.





Between his outpatient psychiatric appointment and his presentation to our ER he

complained of increasing depression, increased feelings of hopelessness and hel

plessness and increasing suicidal thoughts.  He talked about resisting the urge 

to overdose on his prescription medication.  He perseverated on his inability to

maintain his apartment.  He complained that the apartment "is a wreck" and that 

he is unable to clean it due to his obsessive and repetitive behaviors.  He 

would consider moving into a room and board situation or a smaller apartment.  

His obsessive and compulsive behaviors prevents him from cleaning the apartment.

 He talked about spending 15 minutes to an half-hour attempting to clean a 

corner of his apartment.  He stacks and restacks books and papers.  He must 

clean the bathroom several times after use.  He has rituals that he follows 

before leaving the apartment.  The most distressed involved moving papers he 

stacks on the oven before he leaves the apartment.





He feels depressed but was vague and evasive about others symptoms of depressive

disorder such as sleep, appetite, energy or concentration with the exception of 

thoughts to overdose on his prescription medications.  He alleged because of the

severity is depression that he is not been fully compliant with prescribed 

medications.





We admitted him to the psychiatric unit under the care of this writer.





We provided a comprehensive biopsychosocial assessment.  The consultant 

internist completed initial physical exam medical history and diagnosed ALLERGIC

dermatitis, hypertension, hypothyroidism, hyperlipidemia and type 2 diabetes 

mellitus.  The consultant recommended Eucerin cream for the dermatitis, continue

Norvasc, lisinopril and Inderal for hypertension, Continue Synthroid for 

hypothyroidism continue Lipitor for hyperlipidemia and Trajenta for diabetes.  

We continued most of his outpatient psychotropic medications including Clozaril 

150 mg at bedtime, Lamictal 150 mg by mouth twice a day and lithium carbonate 

1200 mg at bedtime.  We tapered and discontinued Ativan, BuSpar and Luvox.  We 

treated depression, anxiety and OCD with Anafranil; titrating the dose to 50 mg 

by mouth twice a day.





His primary distress during most hospitalization was his living situation.  He 

initially maintained that he would not return to his apartment complaining that 

it was "too much" to maintain.  He repeatedly requested assistance with an 

alternative placement.  He met with the [facility] liaison who explained the available 

community options including group home, smaller apartment in a room and board.  

He was interested primarily in placement in a group home or a room and board.  

He appeared not to understand the financial repercussions of alternate 

placement.  As his depression and anxiety improved he was more amenable to 

returning to his apartment.  He understood that he would remain responsible for 

the rent until the end of the apartment lease.  If he were to leave the 

apartment he would lose his section 8 housing voucher.  If he were to move into 

a room and board that he would be pain both the room and board and his apartment

rent.  [facility] arrange for him to have biweekly housecleaning services to support 

his return to his apartment.





At time of discharge she presented as a casually groomed 54-year-old  

male with a beard and male pattern balding.  He made eye contact and attempted 

to interview.  He had a blunted facial expression.  He is alert and oriented to 

person, place and time.  He showed no abnormality of psychomotor activity.  He 

had no abnormal movements.  His speech was spontaneous with decreased rate and 

rhythm.  His affect was blunted but stable and appropriate.  He denied suicidal 

ideation, death wishes or homicidal ideation.  He denied feeling hopeless, 

helpless or worthless.  He did not express ideas reference, paranoid ideation or

delusions.  He did not ruminate about his apartment.  His thinking was abstract 

and associations were coherent and logical.  He denied hallucinations and did 

not appear to be responding to internal stimuli.








Patient Condition at Discharge: Stable





Plan - Discharge Summary


Discharge Rx Participant: No


New Discharge Prescriptions: 


New


   clomiPRAMINE [Anafranil] 50 mg PO BID 30 Days #60 cap





Continue


   Naltrexone Microspheres [Vivitrol] 380 mg IM Q30D


   Loratadine [Claritin] 10 mg PO DAILY 30 Days #30 tab


   cloZAPine [Clozaril] 150 mg PO HS 30 Days #45 tab


   Rosuvastatin Calcium [Crestor] 5 mg PO HS 30 Days #30 tab


   Propranolol HCl [Inderal Xl] 80 mg PO DAILY 30 Days #30 cap


   lamoTRIgine [LaMICtal] 150 mg PO BID 30 Days #60 tab


   Lithium Carbonate 1,200 mg PO HS 30 Days #120 cap


   amLODIPine [Norvasc] 10 mg PO DAILY #14 tab


   Omeprazole 20 mg PO BID PRN 30 Days #30 cap


     PRN Reason: Gi Upset


   Glycopyrrolate [Robinul Forte] 2 mg PO BID 30 Days #60 tab


   Levothyroxine Sodium [Synthroid] 50 mcg PO DAILY 30 Days #30 tab


   Linagliptin [Tradjenta] 5 mg PO DAILY 30 Days #30 tab


   Lisinopril [Zestril] 20 mg PO DAILY 30 Days #30 tab





Discontinued


   busPIRone HCl [Buspar] 30 mg PO BID #28 tab


   fluvoxaMINE MALEATE [Luvox CR] 200 mg PO QAM


   fluvoxaMINE MALEATE [Luvox CR] 100 mg PO HS


   rOPINIRole HCL [Requip] 0.5 mg PO HS


Discharge Medication List





Naltrexone Microspheres [Vivitrol] 380 mg IM Q30D 11/14/17 [History]


Glycopyrrolate [Robinul Forte] 2 mg PO BID 30 Days #60 tab 02/17/20 [Rx]


Levothyroxine Sodium [Synthroid] 50 mcg PO DAILY 30 Days #30 tab 02/17/20 [Rx]


Linagliptin [Tradjenta] 5 mg PO DAILY 30 Days #30 tab 02/17/20 [Rx]


Lisinopril [Zestril] 20 mg PO DAILY 30 Days #30 tab 02/17/20 [Rx]


Lithium Carbonate 1,200 mg PO HS 30 Days #120 cap 02/17/20 [Rx]


Loratadine [Claritin] 10 mg PO DAILY 30 Days #30 tab 02/17/20 [Rx]


Omeprazole 20 mg PO BID PRN 30 Days #30 cap 02/17/20 [Rx]


Propranolol HCl [Inderal Xl] 80 mg PO DAILY 30 Days #30 cap 02/17/20 [Rx]


Rosuvastatin Calcium [Crestor] 5 mg PO HS 30 Days #30 tab 02/17/20 [Rx]


amLODIPine [Norvasc] 10 mg PO DAILY #14 tab 02/17/20 [Rx]


cloZAPine [Clozaril] 150 mg PO HS 30 Days #45 tab 02/17/20 [Rx]


clomiPRAMINE [Anafranil] 50 mg PO BID 30 Days #60 cap 02/17/20 [Rx]


lamoTRIgine [LaMICtal] 150 mg PO BID 30 Days #60 tab 02/17/20 [Rx]








Follow up Appointment(s)/Referral(s): 


[facility] [Outside] - 02/25/20 5:00 pm


(2-25-20 @ 5:00 with Homero Betancourt





3-4-20 @ 11:00 with Dr Osorio)


Enoc Adame MD [Primary Care Provider] - 1-2 days


Patient Instructions/Handouts:  Depression (DC), Schizoaffective Disorder (DC), 

Suicide Prevention (DC)


Activity/Diet/Wound Care/Special Instructions: 


Activity and diet as tolerated. Avoid the use of street drugs and alcohol. Take 

all medications as prescribed. When you are in need of refills on your 

medications please contact your medical provider and/or outpatient psychiatrist 

to have this done. Please go to scheduled outpatient appointment for aftercare 

treatment. If symptoms return or become worse, call the crisis line at 

[phone] and/or go to the nearest emergency room for evaluation.


Discharge Disposition: HOME SELF-CARE

## 2021-04-28 ENCOUNTER — HOSPITAL ENCOUNTER (OUTPATIENT)
Dept: HOSPITAL 47 - RADXRMAIN | Age: 56
Discharge: HOME | End: 2021-04-28
Attending: INTERNAL MEDICINE
Payer: MEDICARE

## 2021-04-28 DIAGNOSIS — M17.11: Primary | ICD-10-CM

## 2021-04-28 NOTE — XR
EXAMINATION TYPE: XR knee limited RT

 

DATE OF EXAM: 4/28/2021

 

COMPARISON: NONE

 

HISTORY: Pain

 

TECHNIQUE:

Two views are submitted.

 

FINDINGS:

On narrowing of the medial compartment of the knee joint. More advanced narrowing of patellofemoral j
oint with a small amount of fluid in the suprapatellar bursa. Osseous structures are intact.  No acut
e fracture seen.  

 

IMPRESSION:

1. Arthropathy.  

2. Small amount of fluid in the suprapatellar bursa could be correlated with MRI as clinically dennis ross

## 2021-05-04 ENCOUNTER — HOSPITAL ENCOUNTER (INPATIENT)
Dept: HOSPITAL 47 - EC | Age: 56
LOS: 8 days | Discharge: HOME HEALTH | DRG: 501 | End: 2021-05-12
Payer: MEDICARE

## 2021-05-04 VITALS — BODY MASS INDEX: 31.3 KG/M2

## 2021-05-04 DIAGNOSIS — Z20.822: ICD-10-CM

## 2021-05-04 DIAGNOSIS — E11.9: ICD-10-CM

## 2021-05-04 DIAGNOSIS — F41.9: ICD-10-CM

## 2021-05-04 DIAGNOSIS — L03.115: ICD-10-CM

## 2021-05-04 DIAGNOSIS — Z79.899: ICD-10-CM

## 2021-05-04 DIAGNOSIS — G47.33: ICD-10-CM

## 2021-05-04 DIAGNOSIS — K21.9: ICD-10-CM

## 2021-05-04 DIAGNOSIS — F42.9: ICD-10-CM

## 2021-05-04 DIAGNOSIS — M17.11: ICD-10-CM

## 2021-05-04 DIAGNOSIS — Z79.84: ICD-10-CM

## 2021-05-04 DIAGNOSIS — Z79.890: ICD-10-CM

## 2021-05-04 DIAGNOSIS — Z82.49: ICD-10-CM

## 2021-05-04 DIAGNOSIS — Z80.9: ICD-10-CM

## 2021-05-04 DIAGNOSIS — Z83.3: ICD-10-CM

## 2021-05-04 DIAGNOSIS — F25.9: ICD-10-CM

## 2021-05-04 DIAGNOSIS — E78.5: ICD-10-CM

## 2021-05-04 DIAGNOSIS — Z88.8: ICD-10-CM

## 2021-05-04 DIAGNOSIS — M70.41: Primary | ICD-10-CM

## 2021-05-04 DIAGNOSIS — Z99.89: ICD-10-CM

## 2021-05-04 DIAGNOSIS — Z88.0: ICD-10-CM

## 2021-05-04 DIAGNOSIS — I10: ICD-10-CM

## 2021-05-04 LAB
ALBUMIN SERPL-MCNC: 4.1 G/DL (ref 3.5–5)
ALP SERPL-CCNC: 74 U/L (ref 38–126)
ALT SERPL-CCNC: 14 U/L (ref 4–49)
ANION GAP SERPL CALC-SCNC: 11 MMOL/L
AST SERPL-CCNC: 22 U/L (ref 17–59)
BASOPHILS # BLD AUTO: 0 K/UL (ref 0–0.2)
BASOPHILS NFR BLD AUTO: 0 %
BUN SERPL-SCNC: 16 MG/DL (ref 9–20)
CALCIUM SPEC-MCNC: 9.2 MG/DL (ref 8.4–10.2)
CHLORIDE SERPL-SCNC: 106 MMOL/L (ref 98–107)
CO2 SERPL-SCNC: 23 MMOL/L (ref 22–30)
EOSINOPHIL # BLD AUTO: 0.1 K/UL (ref 0–0.7)
EOSINOPHIL NFR BLD AUTO: 1 %
ERYTHROCYTE [DISTWIDTH] IN BLOOD BY AUTOMATED COUNT: 4.06 M/UL (ref 4.3–5.9)
ERYTHROCYTE [DISTWIDTH] IN BLOOD: 12.9 % (ref 11.5–15.5)
GLUCOSE SERPL-MCNC: 103 MG/DL (ref 74–99)
HCT VFR BLD AUTO: 35.5 % (ref 39–53)
HGB BLD-MCNC: 12.6 GM/DL (ref 13–17.5)
LYMPHOCYTES # SPEC AUTO: 1.1 K/UL (ref 1–4.8)
LYMPHOCYTES NFR SPEC AUTO: 10 %
MCH RBC QN AUTO: 31.1 PG (ref 25–35)
MCHC RBC AUTO-ENTMCNC: 35.5 G/DL (ref 31–37)
MCV RBC AUTO: 87.6 FL (ref 80–100)
MONOCYTES # BLD AUTO: 0.5 K/UL (ref 0–1)
MONOCYTES NFR BLD AUTO: 5 %
NEUTROPHILS # BLD AUTO: 8.5 K/UL (ref 1.3–7.7)
NEUTROPHILS NFR BLD AUTO: 83 %
PLATELET # BLD AUTO: 345 K/UL (ref 150–450)
POTASSIUM SERPL-SCNC: 4.2 MMOL/L (ref 3.5–5.1)
PROT SERPL-MCNC: 6.9 G/DL (ref 6.3–8.2)
SODIUM SERPL-SCNC: 140 MMOL/L (ref 137–145)
WBC # BLD AUTO: 10.3 K/UL (ref 3.8–10.6)

## 2021-05-04 PROCEDURE — 90471 IMMUNIZATION ADMIN: CPT

## 2021-05-04 PROCEDURE — 87205 SMEAR GRAM STAIN: CPT

## 2021-05-04 PROCEDURE — 85652 RBC SED RATE AUTOMATED: CPT

## 2021-05-04 PROCEDURE — 85025 COMPLETE CBC W/AUTO DIFF WBC: CPT

## 2021-05-04 PROCEDURE — 80202 ASSAY OF VANCOMYCIN: CPT

## 2021-05-04 PROCEDURE — 86140 C-REACTIVE PROTEIN: CPT

## 2021-05-04 PROCEDURE — 87075 CULTR BACTERIA EXCEPT BLOOD: CPT

## 2021-05-04 PROCEDURE — 87070 CULTURE OTHR SPECIMN AEROBIC: CPT

## 2021-05-04 PROCEDURE — 87102 FUNGUS ISOLATION CULTURE: CPT

## 2021-05-04 PROCEDURE — 87040 BLOOD CULTURE FOR BACTERIA: CPT

## 2021-05-04 PROCEDURE — 99285 EMERGENCY DEPT VISIT HI MDM: CPT

## 2021-05-04 PROCEDURE — 87636 SARSCOV2 & INF A&B AMP PRB: CPT

## 2021-05-04 PROCEDURE — 83036 HEMOGLOBIN GLYCOSYLATED A1C: CPT

## 2021-05-04 PROCEDURE — 90715 TDAP VACCINE 7 YRS/> IM: CPT

## 2021-05-04 PROCEDURE — 80053 COMPREHEN METABOLIC PANEL: CPT

## 2021-05-04 PROCEDURE — 85610 PROTHROMBIN TIME: CPT

## 2021-05-04 PROCEDURE — 36415 COLL VENOUS BLD VENIPUNCTURE: CPT

## 2021-05-04 PROCEDURE — 80048 BASIC METABOLIC PNL TOTAL CA: CPT

## 2021-05-04 PROCEDURE — 82565 ASSAY OF CREATININE: CPT

## 2021-05-04 RX ADMIN — LITHIUM CARBONATE SCH: 300 CAPSULE, GELATIN COATED ORAL at 22:09

## 2021-05-04 RX ADMIN — ATORVASTATIN CALCIUM SCH: 10 TABLET, FILM COATED ORAL at 22:08

## 2021-05-04 RX ADMIN — CEFAZOLIN SCH MLS/HR: 330 INJECTION, POWDER, FOR SOLUTION INTRAMUSCULAR; INTRAVENOUS at 15:52

## 2021-05-04 RX ADMIN — GLYCOPYRROLATE SCH: 1 TABLET ORAL at 22:09

## 2021-05-04 RX ADMIN — MECLIZINE HYDROCHLORIDE SCH: 25 TABLET ORAL at 22:10

## 2021-05-04 NOTE — ED
Skin/Abscess/FB HPI





- General


Chief complaint: Skin/Abscess/Foreign Body


Stated complaint: Rt Leg Cellulitis


Time Seen by Provider: 05/04/21 14:02


Source: patient, RN notes reviewed


Mode of arrival: ambulatory


Limitations: no limitations





- History of Present Illness


Initial comments: 





55-year-old white male patient presents to the emergency room after failing 

outpatient therapy with Dr. Raines for right lower leg cellulitis.  Patient states

that the redness and inflammation started from his right knee to his right mid 

lower leg the middle of last week. Dr Raines put patient on Bactrim at that time. 

Swelling has increased and now extends to the ankle.  Patient denies any 

systemic symptoms, no nausea vomiting diarrhea or fevers.  Patient denies any in

jury or penetrating trauma at the site.  Patient states able to ambulate however

does cause increasing pain and tightness to the right lower leg.  Patient states

Dr. raines sent him into the emergency room and states there is nothing more that 

he can do outpatient.  Patient states he has not had this in the past, no 

history of MRSA. 


MD complaint: other (Cellulitis of the right lower leg, failed outpatient 

treatment)


-: days(s) (5)


Location: RLE


Severity: moderate


Severity scale (1-10): 6


Quality: other (Pressure, tight)


Consistency: constant


Improves with: none


Worsens with: other (Walking or standing)


Associated symptoms: denies other symptoms


Treatments Prior to Arrival: antibiotic, NSAID





- Related Data


                                Home Medications











 Medication  Instructions  Recorded  Confirmed


 


Cetirizine HCl [Zyrtec] 10 mg PO DAILY 05/04/21 05/04/21


 


Meclizine [Antivert] 25 mg PO BID 05/04/21 05/04/21


 


Sulfamethoxazole/Trimethoprim 1 tab PO BID 05/04/21 05/04/21





[Bactrim -160 mg]   


 


clomiPRAMINE [Anafranil] 100 mg PO BID 05/04/21 05/04/21


 


lamoTRIgine [LaMICtal] 150 mg PO BID 05/04/21 05/04/21








                                  Previous Rx's











 Medication  Instructions  Recorded


 


Glycopyrrolate [Robinul Forte] 2 mg PO BID 30 Days #60 tab 02/17/20


 


Levothyroxine Sodium [Synthroid] 50 mcg PO DAILY 30 Days #30 tab 02/17/20


 


Linagliptin [Tradjenta] 5 mg PO DAILY 30 Days #30 tab 02/17/20


 


Lithium Carbonate 1,200 mg PO HS 30 Days #120 cap 02/17/20


 


Propranolol HCl [Inderal Xl] 80 mg PO DAILY 30 Days #30 cap 02/17/20


 


Rosuvastatin Calcium [Crestor] 5 mg PO HS 30 Days #30 tab 02/17/20


 


amLODIPine [Norvasc] 10 mg PO DAILY #14 tab 02/17/20


 


cloZAPine [Clozaril] 150 mg PO HS 30 Days #45 tab 02/17/20


 


lisinopriL [Zestril] 20 mg PO DAILY 30 Days #30 tab 02/17/20











                                    Allergies











Allergy/AdvReac Type Severity Reaction Status Date / Time


 


Penicillins Allergy  Unknown Verified 05/04/21 14:53





   Childhood  


 


risperidone [From Risperdal] Allergy  Unknown Verified 05/04/21 14:53


 


divalproex sodium AdvReac  Anxiety, Verified 05/04/21 14:53





[From Depakote]   weight gain  














Review of Systems


ROS Statement: 


Those systems with pertinent positive or pertinent negative responses have been 

documented in the HPI.





ROS Other: All systems not noted in ROS Statement are negative.





Past Medical History


Past Medical History: Diabetes Mellitus, GERD/Reflux, Hyperlipidemia, 

Hypertension, Sleep Apnea/CPAP/BIPAP


Additional Past Medical History / Comment(s): Family history of premature 

coronary artery disease. OCD.


History of Any Multi-Drug Resistant Organisms: None Reported


Past Surgical History: Hernia Repair


Past Anesthesia/Blood Transfusion Reactions: No Reported Reaction


Past Psychological History: Anxiety, Bipolar, Depression, Schizoaffective 

Disorder


Smoking Status: Never smoker


Past Alcohol Use History: None Reported


Past Drug Use History: None Reported





- Past Family History


  ** Father


Family Medical History: Congestive Heart Failure (CHF), Coronary Artery Disease 

(CAD), Diabetes Mellitus





  ** Mother


Family Medical History: Cancer





General Exam


Limitations: no limitations


General appearance: alert, in no apparent distress


Head exam: Present: atraumatic, normocephalic, normal inspection


Eye exam: Present: normal appearance, PERRL, EOMI.  Absent: scleral icterus, 

conjunctival injection, periorbital swelling


ENT exam: Present: normal exam, normal oropharynx, mucous membranes moist


Neck exam: Present: normal inspection, full ROM.  Absent: tenderness, 

meningismus, lymphadenopathy, thyromegaly


Respiratory exam: Present: normal lung sounds bilaterally.  Absent: respiratory 

distress, wheezes, rales, rhonchi, stridor, decreased breath sounds


Cardiovascular Exam: Present: regular rate, normal rhythm, normal heart sounds. 

Absent: systolic murmur, diastolic murmur, rubs, gallop, clicks


GI/Abdominal exam: Present: soft, normal bowel sounds.  Absent: distended, 

tenderness, guarding, rebound, rigid


Extremities exam: Present: normal inspection, full ROM, normal capillary refill,

pedal edema (2+ ).  Absent: tenderness, joint swelling, calf tenderness


Back exam: Present: normal inspection, full ROM.  Absent: tenderness, CVA 

tenderness (R), CVA tenderness (L)


Neurological exam: Present: alert, oriented X3, CN II-XII intact


Psychiatric exam: Present: normal affect, normal mood


Skin exam: Present: warm, dry, intact, erythema (RLE extending anteriorly from 

knee to ankle, not circumfrential, no calf pain).  Absent: normal color, rash, 

cyanosis, diaphoretic, urticaria, vesicles, petechiae, pallor, mottled, abrasion





Course


                                   Vital Signs











  05/04/21





  13:56


 


Temperature 97.7 F


 


Pulse Rate 72


 


Respiratory 20





Rate 


 


Blood Pressure 108/75


 


O2 Sat by Pulse 95





Oximetry 














Medical Decision Making





- Medical Decision Making





WBC count is 10.3, with a neutrophil count 8.5, creatinine is 1.26 however this 

is consistent for patient as he was 1.23 last year.  Glucose is 103.  Will admit

patient for failed outpatient therapy with Bactrim for cellulitis and started on

vancomycin.  Case discussed with Dr. Fontanez. 





- Lab Data


Result diagrams: 


                                 05/04/21 14:10





                                 05/04/21 14:10


                                   Lab Results











  05/04/21 05/04/21 Range/Units





  14:10 14:10 


 


WBC  10.3   (3.8-10.6)  k/uL


 


RBC  4.06 L   (4.30-5.90)  m/uL


 


Hgb  12.6 L   (13.0-17.5)  gm/dL


 


Hct  35.5 L   (39.0-53.0)  %


 


MCV  87.6   (80.0-100.0)  fL


 


MCH  31.1   (25.0-35.0)  pg


 


MCHC  35.5   (31.0-37.0)  g/dL


 


RDW  12.9   (11.5-15.5)  %


 


Plt Count  345   (150-450)  k/uL


 


MPV  6.4   


 


Neutrophils %  83   %


 


Lymphocytes %  10   %


 


Monocytes %  5   %


 


Eosinophils %  1   %


 


Basophils %  0   %


 


Neutrophils #  8.5 H   (1.3-7.7)  k/uL


 


Lymphocytes #  1.1   (1.0-4.8)  k/uL


 


Monocytes #  0.5   (0-1.0)  k/uL


 


Eosinophils #  0.1   (0-0.7)  k/uL


 


Basophils #  0.0   (0-0.2)  k/uL


 


Sodium   140  (137-145)  mmol/L


 


Potassium   4.2  (3.5-5.1)  mmol/L


 


Chloride   106  ()  mmol/L


 


Carbon Dioxide   23  (22-30)  mmol/L


 


Anion Gap   11  mmol/L


 


BUN   16  (9-20)  mg/dL


 


Creatinine   1.26 H  (0.66-1.25)  mg/dL


 


Est GFR (CKD-EPI)AfAm   75  (>60 ml/min/1.73 sqM)  


 


Est GFR (CKD-EPI)NonAf   65  (>60 ml/min/1.73 sqM)  


 


Glucose   103 H  (74-99)  mg/dL


 


Calcium   9.2  (8.4-10.2)  mg/dL


 


Total Bilirubin   0.3  (0.2-1.3)  mg/dL


 


AST   22  (17-59)  U/L


 


ALT   14  (4-49)  U/L


 


Alkaline Phosphatase   74  ()  U/L


 


Total Protein   6.9  (6.3-8.2)  g/dL


 


Albumin   4.1  (3.5-5.0)  g/dL














Disposition


Clinical Impression: 


 Cellulitis, leg





Disposition: ADMITTED AS IP TO THIS HOSP


Decision Date: 05/04/21


Decision Time: 17:26

## 2021-05-04 NOTE — US
EXAMINATION TYPE: US venous doppler duplex LE RT

 

DATE OF EXAM: 5/4/2021 10:50 PM

 

COMPARISON: NONE

 

CLINICAL HISTORY: leg swelling. Leg swelling and pain x 1 week. No hx of DVT. Patient does not take b
lood thinner.

 

SIDE PERFORMED: Right  

 

TECHNIQUE:  The lower extremity deep venous system is examined utilizing real time linear array sonog
carter with graded compression, doppler sonography and color-flow sonography.

 

VESSELS IMAGED:

Common Femoral Vein

Deep Femoral Vein

Greater Saphenous Vein *

Femoral Vein

Popliteal Vein

Small Saphenous Vein *

Proximal Calf Veins

(* superficial vessels)

 

 

 

Right Leg:  No evidence of DVT in veins imaged at this time.

 

 

IMPRESSION:  Normal exam. No evidence of deep vein thrombosis in the right leg.

## 2021-05-05 LAB
ANION GAP SERPL CALC-SCNC: 6 MMOL/L
BASOPHILS # BLD AUTO: 0 K/UL (ref 0–0.2)
BASOPHILS NFR BLD AUTO: 0 %
BUN SERPL-SCNC: 17 MG/DL (ref 9–20)
CALCIUM SPEC-MCNC: 9.1 MG/DL (ref 8.4–10.2)
CHLORIDE SERPL-SCNC: 111 MMOL/L (ref 98–107)
CO2 SERPL-SCNC: 27 MMOL/L (ref 22–30)
EOSINOPHIL # BLD AUTO: 0 K/UL (ref 0–0.7)
EOSINOPHIL NFR BLD AUTO: 0 %
ERYTHROCYTE [DISTWIDTH] IN BLOOD BY AUTOMATED COUNT: 3.97 M/UL (ref 4.3–5.9)
ERYTHROCYTE [DISTWIDTH] IN BLOOD: 13.2 % (ref 11.5–15.5)
GLUCOSE SERPL-MCNC: 100 MG/DL (ref 74–99)
HBA1C MFR BLD: 5.4 % (ref 4–6)
HCT VFR BLD AUTO: 35.1 % (ref 39–53)
HGB BLD-MCNC: 11.8 GM/DL (ref 13–17.5)
LYMPHOCYTES # SPEC AUTO: 1.2 K/UL (ref 1–4.8)
LYMPHOCYTES NFR SPEC AUTO: 15 %
MCH RBC QN AUTO: 29.7 PG (ref 25–35)
MCHC RBC AUTO-ENTMCNC: 33.6 G/DL (ref 31–37)
MCV RBC AUTO: 88.3 FL (ref 80–100)
MONOCYTES # BLD AUTO: 0.5 K/UL (ref 0–1)
MONOCYTES NFR BLD AUTO: 6 %
NEUTROPHILS # BLD AUTO: 5.9 K/UL (ref 1.3–7.7)
NEUTROPHILS NFR BLD AUTO: 77 %
PLATELET # BLD AUTO: 298 K/UL (ref 150–450)
POTASSIUM SERPL-SCNC: 3.5 MMOL/L (ref 3.5–5.1)
SODIUM SERPL-SCNC: 144 MMOL/L (ref 137–145)
WBC # BLD AUTO: 7.6 K/UL (ref 3.8–10.6)

## 2021-05-05 RX ADMIN — SODIUM CHLORIDE SCH MLS/HR: 9 INJECTION, SOLUTION INTRAVENOUS at 12:05

## 2021-05-05 RX ADMIN — GLYCOPYRROLATE SCH MG: 1 TABLET ORAL at 21:59

## 2021-05-05 RX ADMIN — ACETAMINOPHEN PRN MG: 325 TABLET, FILM COATED ORAL at 19:34

## 2021-05-05 RX ADMIN — ATORVASTATIN CALCIUM SCH MG: 10 TABLET, FILM COATED ORAL at 21:58

## 2021-05-05 RX ADMIN — GLYCOPYRROLATE SCH MG: 1 TABLET ORAL at 09:38

## 2021-05-05 RX ADMIN — CEFAZOLIN SCH MLS/HR: 330 INJECTION, POWDER, FOR SOLUTION INTRAMUSCULAR; INTRAVENOUS at 00:19

## 2021-05-05 RX ADMIN — MECLIZINE HYDROCHLORIDE SCH MG: 25 TABLET ORAL at 09:39

## 2021-05-05 RX ADMIN — HEPARIN SODIUM SCH UNIT: 5000 INJECTION INTRAVENOUS; SUBCUTANEOUS at 00:12

## 2021-05-05 RX ADMIN — SODIUM CHLORIDE SCH MLS/HR: 9 INJECTION, SOLUTION INTRAVENOUS at 00:09

## 2021-05-05 RX ADMIN — LITHIUM CARBONATE SCH MG: 300 CAPSULE, GELATIN COATED ORAL at 22:00

## 2021-05-05 RX ADMIN — LEVOTHYROXINE SODIUM SCH MCG: 50 TABLET ORAL at 09:39

## 2021-05-05 RX ADMIN — HEPARIN SODIUM SCH UNIT: 5000 INJECTION INTRAVENOUS; SUBCUTANEOUS at 09:37

## 2021-05-05 RX ADMIN — CEFAZOLIN SCH MLS/HR: 330 INJECTION, POWDER, FOR SOLUTION INTRAMUSCULAR; INTRAVENOUS at 18:05

## 2021-05-05 RX ADMIN — ACETAMINOPHEN PRN MG: 325 TABLET, FILM COATED ORAL at 03:23

## 2021-05-05 RX ADMIN — HEPARIN SODIUM SCH UNIT: 5000 INJECTION INTRAVENOUS; SUBCUTANEOUS at 18:09

## 2021-05-05 RX ADMIN — ACETAMINOPHEN PRN MG: 325 TABLET, FILM COATED ORAL at 09:45

## 2021-05-05 RX ADMIN — MECLIZINE HYDROCHLORIDE SCH MG: 25 TABLET ORAL at 22:00

## 2021-05-05 RX ADMIN — PROPRANOLOL HYDROCHLORIDE SCH MG: 80 CAPSULE, EXTENDED RELEASE ORAL at 09:40

## 2021-05-06 RX ADMIN — GLYCOPYRROLATE SCH MG: 1 TABLET ORAL at 20:31

## 2021-05-06 RX ADMIN — HEPARIN SODIUM SCH UNIT: 5000 INJECTION INTRAVENOUS; SUBCUTANEOUS at 17:00

## 2021-05-06 RX ADMIN — ACETAMINOPHEN PRN MG: 325 TABLET, FILM COATED ORAL at 19:37

## 2021-05-06 RX ADMIN — GLYCOPYRROLATE SCH MG: 1 TABLET ORAL at 09:54

## 2021-05-06 RX ADMIN — CEFAZOLIN SCH: 330 INJECTION, POWDER, FOR SOLUTION INTRAMUSCULAR; INTRAVENOUS at 01:30

## 2021-05-06 RX ADMIN — LEVOTHYROXINE SODIUM SCH MCG: 50 TABLET ORAL at 06:07

## 2021-05-06 RX ADMIN — PROPRANOLOL HYDROCHLORIDE SCH MG: 80 CAPSULE, EXTENDED RELEASE ORAL at 09:55

## 2021-05-06 RX ADMIN — HEPARIN SODIUM SCH UNIT: 5000 INJECTION INTRAVENOUS; SUBCUTANEOUS at 09:53

## 2021-05-06 RX ADMIN — SODIUM CHLORIDE SCH MLS/HR: 9 INJECTION, SOLUTION INTRAVENOUS at 13:01

## 2021-05-06 RX ADMIN — HEPARIN SODIUM SCH UNIT: 5000 INJECTION INTRAVENOUS; SUBCUTANEOUS at 00:10

## 2021-05-06 RX ADMIN — SODIUM CHLORIDE SCH MLS/HR: 9 INJECTION, SOLUTION INTRAVENOUS at 00:10

## 2021-05-06 RX ADMIN — LITHIUM CARBONATE SCH MG: 300 CAPSULE, GELATIN COATED ORAL at 20:30

## 2021-05-06 RX ADMIN — MECLIZINE HYDROCHLORIDE SCH MG: 25 TABLET ORAL at 09:54

## 2021-05-06 RX ADMIN — ACETAMINOPHEN PRN MG: 325 TABLET, FILM COATED ORAL at 02:57

## 2021-05-06 RX ADMIN — MECLIZINE HYDROCHLORIDE SCH MG: 25 TABLET ORAL at 20:31

## 2021-05-06 RX ADMIN — ATORVASTATIN CALCIUM SCH MG: 10 TABLET, FILM COATED ORAL at 20:29

## 2021-05-06 NOTE — CDI
Documentation Clarification Form



Date: 05/06/2021 09:39:25 AM

From: Dahlia Valentino RN CCDS

Phone: [phone]

MRN: [id number]

Admit Date: 05/06/2021 07:27:00 AM

Patient Name: Ovi Vicente

Visit Number: [id number]

Discharge Date:  





ATTENTION: The Clinical Documentation Specialists (CDI) and [facility] Coding Staff 
appreciate your assistance in clarifying documentation. Please respond to the 
clarification below the line at the bottom and electronically sign. The CDI & 
[facility] Coding staff will review the response and follow-up if needed. Please note: 
Queries are made part of the Legal Health Record. If you have any questions, 
please contact the author of this message via ITS.



Dr. Pilar Hayden



Cellulitis is documented in H&P 5/4.   Additional clarification regarding the 
type of cellulitis is requested.





History/risk factors: 55-year-old female presents to the ED with right lower 
extremity cellulitis and failed antibiotic therapy (Bactrim). The redness 
swelling and pain started a week ago below the right knee and has increased 
extending to the ankle. Medical History: DM 2 and HLD. H&P 5/4



Clinical Indicators: 

Known history of DM2 Non-insulin dependent. H&P 5/4.

 Labs: 5/4 Glucose 103; 5/5 Glucose 100. 

H&P Physical examination: Patient does have swelling of the right lower 
extremity and redness extending below the knee and up the ankle. Tenderness 
and warmth. 



Treatment: 5/4: Vancomycin 1,500mg x1 IVPB. 5/6 Vancomycin 1,500mg IVPB Q12H.



Please clarify the type of cellulitis, if known:



   [  ]  Cellulitis due to diabetes

   [  ]  Chronic Cellulitis

   [  ]  Acute Lymphangitis

   [  ]  Other, please specify:

   [  ]  Unable to determine

 

(Template Last Revised: March 2021)

5/8 Progress note Dr Maloney "Right lower extremity swelling/ cellulitis failed 
outpatient therapy." "Ruled out DVT." "Unlikely related to diabetes."



MTDD

## 2021-05-07 LAB
BASOPHILS # BLD AUTO: 0 K/UL (ref 0–0.2)
BASOPHILS NFR BLD AUTO: 0 %
EOSINOPHIL # BLD AUTO: 0 K/UL (ref 0–0.7)
EOSINOPHIL NFR BLD AUTO: 0 %
ERYTHROCYTE [DISTWIDTH] IN BLOOD BY AUTOMATED COUNT: 4.11 M/UL (ref 4.3–5.9)
ERYTHROCYTE [DISTWIDTH] IN BLOOD: 12.8 % (ref 11.5–15.5)
HCT VFR BLD AUTO: 36.2 % (ref 39–53)
HGB BLD-MCNC: 12.3 GM/DL (ref 13–17.5)
INR PPP: 1 (ref ?–1.2)
LYMPHOCYTES # SPEC AUTO: 1.1 K/UL (ref 1–4.8)
LYMPHOCYTES NFR SPEC AUTO: 15 %
MCH RBC QN AUTO: 29.9 PG (ref 25–35)
MCHC RBC AUTO-ENTMCNC: 33.9 G/DL (ref 31–37)
MCV RBC AUTO: 88.2 FL (ref 80–100)
MONOCYTES # BLD AUTO: 0.4 K/UL (ref 0–1)
MONOCYTES NFR BLD AUTO: 6 %
NEUTROPHILS # BLD AUTO: 5.7 K/UL (ref 1.3–7.7)
NEUTROPHILS NFR BLD AUTO: 78 %
PLATELET # BLD AUTO: 343 K/UL (ref 150–450)
PT BLD: 10.4 SEC (ref 9–12)
WBC # BLD AUTO: 7.3 K/UL (ref 3.8–10.6)

## 2021-05-07 RX ADMIN — LITHIUM CARBONATE SCH MG: 300 CAPSULE, GELATIN COATED ORAL at 21:00

## 2021-05-07 RX ADMIN — SODIUM CHLORIDE SCH MLS/HR: 9 INJECTION, SOLUTION INTRAVENOUS at 11:45

## 2021-05-07 RX ADMIN — ACETAMINOPHEN PRN MG: 325 TABLET, FILM COATED ORAL at 05:27

## 2021-05-07 RX ADMIN — LEVOTHYROXINE SODIUM SCH MCG: 50 TABLET ORAL at 05:27

## 2021-05-07 RX ADMIN — SODIUM CHLORIDE SCH MLS/HR: 9 INJECTION, SOLUTION INTRAVENOUS at 23:44

## 2021-05-07 RX ADMIN — SODIUM CHLORIDE SCH MLS/HR: 9 INJECTION, SOLUTION INTRAVENOUS at 00:17

## 2021-05-07 RX ADMIN — GLYCOPYRROLATE SCH MG: 1 TABLET ORAL at 07:56

## 2021-05-07 RX ADMIN — MECLIZINE HYDROCHLORIDE SCH MG: 25 TABLET ORAL at 07:56

## 2021-05-07 RX ADMIN — HEPARIN SODIUM SCH UNIT: 5000 INJECTION INTRAVENOUS; SUBCUTANEOUS at 15:23

## 2021-05-07 RX ADMIN — PROPRANOLOL HYDROCHLORIDE SCH MG: 80 CAPSULE, EXTENDED RELEASE ORAL at 07:56

## 2021-05-07 RX ADMIN — ATORVASTATIN CALCIUM SCH MG: 10 TABLET, FILM COATED ORAL at 21:00

## 2021-05-07 RX ADMIN — HEPARIN SODIUM SCH UNIT: 5000 INJECTION INTRAVENOUS; SUBCUTANEOUS at 07:56

## 2021-05-07 RX ADMIN — GLYCOPYRROLATE SCH MG: 1 TABLET ORAL at 21:00

## 2021-05-07 RX ADMIN — HEPARIN SODIUM SCH: 5000 INJECTION INTRAVENOUS; SUBCUTANEOUS at 23:40

## 2021-05-07 RX ADMIN — MECLIZINE HYDROCHLORIDE SCH MG: 25 TABLET ORAL at 21:00

## 2021-05-07 RX ADMIN — HEPARIN SODIUM SCH UNIT: 5000 INJECTION INTRAVENOUS; SUBCUTANEOUS at 00:17

## 2021-05-07 NOTE — P.PN
Subjective


Progress Note Date: 05/06/21


Principal diagnosis: 





Right lower extremity swelling/cellulitis








Patient is a 55-year-old male with a known history of diabetes type 2 

non-insulin-dependent, hypertension, hyperlipidemia, obstructive sleep apnea, 

GERD, anxiety/depression/bipolar/schizoaffective disorder presents to ER due to 

complaints of right leg swelling and redness.  Patient was seen at Dr. Adame's 

office and was referred to ER due to right lower extremity cellulitis and failed

antibiotic therapy with Bactrim.  Patient states that redness and pain started 

below his right knee about a week ago and was started on Bactrim as an 

outpatient.  Patient is having increasing leg swelling and redness extending to 

lower leg up to ankle.


Otherwise patient denied any complaints of fever or chills.  No nausea vomiting 

abdominal pain or diarrhea.  No injury.  No fall.  Patient is having increasing 

pain and tightness to the right lower leg.


Patient had x-ray of the right leg 4/28/2021 showed arthropathy.  Small amount 

of fluid in the suprapatellar bursa could be correlated with MRI as clinically 

warranted.


Laboratory data showed WBC 10.3, hemoglobin 12.6 and platelets 345


Sodium 140 potassium 4.2 chloride 106 BUN 16 and creatinine 1.26


COVID-19 PCR not detected and liver enzymes are not elevated





5/4/2021


Patient is currently lying in the bed comfortably.  Right lower extremity 

swelling and redness is slightly improved.  Patient is able to bear weight.  

Still having right infrapatellar region is indurated and tenderness with 

palpation.  Patient has been afebrile.  Continued on antibiotics of vancomycin. 

Blood cultures negative so far.


No complaints of chest pain or shortness of breath.  No nausea vomiting 

abdominal pain or diarrhea.


Right lower extremity duplex scan is negative for DVT.





5/5/2021


Patient is resting in the bed awake alert 1x3.  Patient is still having right 

lower extremity swelling and redness and also tenderness of the right infra 

patella region.  Otherwise patient is afebrile.  Still unable to bear weight 

completely.  Continued on antibiotics and home vancomycin.


ID and orthopedic surgery will be consulted and continue with pain management.


Denies any chest pain or shortness of breath.  Patient has been afebrile.  No 

nausea vomiting or abdominal pain or diarrhea.





Current medications reviewed.





Objective





- Vital Signs


Vital signs: 


                                   Vital Signs











Temp  98.0 F   05/06/21 20:19


 


Pulse  64   05/06/21 20:19


 


Resp  14   05/06/21 20:19


 


BP  145/92   05/06/21 20:19


 


Pulse Ox  95   05/06/21 20:19








                                 Intake & Output











 05/06/21 05/06/21 05/07/21





 06:59 18:59 06:59


 


Intake Total 1830 1350 790


 


Output Total  0 


 


Balance 1830 1350 790


 


Intake:   


 


  Intake, IV Titration 650 250 





  Amount   


 


    Sodium Chloride 0.9% 1, 400  





    000 ml @ 100 mls/hr IV .   





    Q10H [facility] Rx#:[id number]   


 


    Vancomycin 1,500 mg In 250 250 





    Sodium Chloride 0.9% 250   





    ml @ 125 mls/hr IVPB Q12H   





    [facility] Rx#:[id number]   


 


  Oral 1180 1100 790


 


Output:   


 


  Stool  0 


 


Other:   


 


  Voiding Method Toilet Toilet Toilet


 


  # Voids 2 4 1














- Exam





PHYSICAL EXAMINATION: 


Patient is lying in the bed comfortably, no acute distress, awake alert and 

oriented.. 


HEENT: Normocephalic. Neck is supple. Pupils reactive. Nostrils clear. Oral 

cavity is moist. Ears reveal no drainage. 


Neck reveals no JVD, carotid bruits, or thyromegaly. 


CHEST EXAMINATION: Trachea is central. Symmetrical expansion. Lung fields clear 

to auscultation and percussion. 


CARDIAC: Normal S1, S2 with no gallops. No murmurs 


ABDOMEN: Soft. Bowel sounds normal. No organomegaly. No abdominal bruits. 


Extremities: Patient does have swelling of the right lower extremity and redness

extending below the knee and up to ankle.  Tenderness and warmth..  No clubbing 

or cyanosis


Tenderness and induration over the right infrapatellar region.


Neurologically awake, alert, oriented x3 with well-coordinated movements.  No 

focal deficits noted


Skin: No rash or skin lesions. 


Psychiatric: Coperative.  Nonsuicidal


Musculoskeletal: No joint swelling or deformity.  Normal range of motion.





- Labs


CBC & Chem 7: 


                                 05/05/21 05:34





                                 05/06/21 04:55


Labs: 


                      Microbiology - Last 24 Hours (Table)











 05/04/21 14:10 Blood Culture - Preliminary





 Blood    No Growth after 48 hours


 


 05/04/21 14:31 Blood Culture - Preliminary





 Blood    No Growth after 48 hours














Assessment and Plan


Assessment: 








Right lower extremity swelling/cellulitis failed outpatient therapy.  Ruled out 

DVT.Unlikely related to diabetes.


Right infrapatellar rule out septic bursitis


Diabetes type 2 non-insulin-dependent


Obstructive sleep apnea on CPAP


Hypertension


Hyperlipidemia


GERD


Anxiety, Bipolar, Depression, Schizoaffective Disorder


DVT prophylaxis with heparin subcu





Plan:


Patient will be continued on IV hydration with normal saline and antibiotics in 

the form of vancomycin.  Follow-up blood culture reports.  Ultrasound duplex of 

the right lower extremity negative for DVT.


Patient is still having right orbital swelling and infra patellar tenderness and

redness and swelling.  ID and orthopedic surgery will be consulted.


Continue with home medications and follow-up closely. 


Further recommendations based on clinical course.


Time with Patient: Greater than 30

## 2021-05-07 NOTE — P.PN
Subjective


Progress Note Date: 05/07/21


Principal diagnosis: 





Right lower extremity swelling/cellulitis








Patient is a 55-year-old male with a known history of diabetes type 2 

non-insulin-dependent, hypertension, hyperlipidemia, obstructive sleep apnea, 

GERD, anxiety/depression/bipolar/schizoaffective disorder presents to ER due to 

complaints of right leg swelling and redness.  Patient was seen at Dr. Adame's 

office and was referred to ER due to right lower extremity cellulitis and failed

antibiotic therapy with Bactrim.  Patient states that redness and pain started 

below his right knee about a week ago and was started on Bactrim as an 

outpatient.  Patient is having increasing leg swelling and redness extending to 

lower leg up to ankle.


Otherwise patient denied any complaints of fever or chills.  No nausea vomiting 

abdominal pain or diarrhea.  No injury.  No fall.  Patient is having increasing 

pain and tightness to the right lower leg.


Patient had x-ray of the right leg 4/28/2021 showed arthropathy.  Small amount 

of fluid in the suprapatellar bursa could be correlated with MRI as clinically 

warranted.


Laboratory data showed WBC 10.3, hemoglobin 12.6 and platelets 345


Sodium 140 potassium 4.2 chloride 106 BUN 16 and creatinine 1.26


COVID-19 PCR not detected and liver enzymes are not elevated





5/5/2021


Patient is currently lying in the bed comfortably.  Right lower extremity 

swelling and redness is slightly improved.  Patient is able to bear weight.  

Still having right infrapatellar region is indurated and tenderness with 

palpation.  Patient has been afebrile.  Continued on antibiotics of vancomycin. 

Blood cultures negative so far.


No complaints of chest pain or shortness of breath.  No nausea vomiting 

abdominal pain or diarrhea.


Right lower extremity duplex scan is negative for DVT.





5/6/2021


Patient is resting in the bed awake alert 1x3.  Patient is still having right 

lower extremity swelling and redness and also tenderness of the right infra 

patella region.  Otherwise patient is afebrile.  Still unable to bear weight 

completely.  Continued on antibiotics and home vancomycin.


ID and orthopedic surgery will be consulted and continue with pain management.


Denies any chest pain or shortness of breath.  Patient has been afebrile.  No 

nausea vomiting or abdominal pain or diarrhea.





5/7/2021


Patient is currently lying in the bed comfortably.  Still having right leg 

swelling and redness is improving.  Right infrapatellar tenderness is not 

improving.  Patient was seen by orthopedic surgery and is planning for I&D 

tomorrow.  Follow-up culture reports.  Continue with antibiotics in the form of 

vancomycin.  Patient is allergic to penicillin.  ID is on board.  Otherwise 

patient has been afebrile.  Nothing by mouth and repeat labs tomorrow.


No complaints of chest pain or shortness breath.  No nausea vomiting abdominal 

pain or diarrhea.  No headache or dizziness or lightheadedness.





Current medications reviewed.





Objective





- Vital Signs


Vital signs: 


                                   Vital Signs











Temp  98.2 F   05/07/21 19:35


 


Pulse  71   05/07/21 19:35


 


Resp  16   05/07/21 19:35


 


BP  162/102   05/07/21 19:35


 


Pulse Ox  95   05/07/21 19:35








                                 Intake & Output











 05/07/21 05/07/21 05/08/21





 06:59 18:59 06:59


 


Intake Total 1040  


 


Output Total  0 


 


Balance 1040 0 


 


Intake:   


 


  Intake, IV Titration 250  





  Amount   


 


    Vancomycin 1,500 mg In 250  





    Sodium Chloride 0.9% 250   





    ml @ 125 mls/hr IVPB Q12H   





    [facility] Rx#:[id number]   


 


  Oral 790  


 


Output:   


 


  Stool  0 


 


Other:   


 


  Voiding Method Toilet Toilet 


 


  # Voids 1 1 














- Exam





PHYSICAL EXAMINATION: 


Patient is lying in the bed comfortably, no acute distress, awake alert and 

oriented.. 


HEENT: Normocephalic. Neck is supple. Pupils reactive. Nostrils clear. Oral 

cavity is moist. Ears reveal no drainage. 


Neck reveals no JVD, carotid bruits, or thyromegaly. 


CHEST EXAMINATION: Trachea is central. Symmetrical expansion. Lung fields clear 

to auscultation and percussion. 


CARDIAC: Normal S1, S2 with no gallops. No murmurs 


ABDOMEN: Soft. Bowel sounds normal. No organomegaly. No abdominal bruits. 


Extremities: Patient does have swelling of the right lower extremity and redness

extending below the knee and up to ankle.  Tenderness and warmth..  No clubbing 

or cyanosis


Tenderness and induration over the right infrapatellar region.


Neurologically awake, alert, oriented x3 with well-coordinated movements.  No 

focal deficits noted


Skin: No rash or skin lesions. 


Psychiatric: Coperative.  Nonsuicidal


Musculoskeletal: No joint swelling or deformity.  Normal range of motion.





- Labs


CBC & Chem 7: 


                                 05/07/21 09:24





                                 05/06/21 04:55


Labs: 


                  Abnormal Lab Results - Last 24 Hours (Table)











  05/07/21 05/07/21 05/07/21 Range/Units





  09:24 09:24 09:24 


 


RBC  4.11 L    (4.30-5.90)  m/uL


 


Hgb  12.3 L    (13.0-17.5)  gm/dL


 


Hct  36.2 L    (39.0-53.0)  %


 


ESR   19 H   (0-15)  mm/hr


 


C-Reactive Protein    1.3 H  (<1.0)  mg/dL








                      Microbiology - Last 24 Hours (Table)











 05/04/21 14:31 Blood Culture - Preliminary





 Blood    No Growth after 72 hours


 


 05/04/21 14:10 Blood Culture - Preliminary





 Blood    No Growth after 72 hours














Assessment and Plan


Assessment: 








Right lower extremity swelling/cellulitis failed outpatient therapy.  Ruled out 

DVT.Unlikely related to diabetes.


Right infrapatellar rule out septic bursitis


Diabetes type 2 non-insulin-dependent


Obstructive sleep apnea on CPAP


Hypertension


Hyperlipidemia


GERD


Anxiety, Bipolar, Depression, Schizoaffective Disorder


DVT prophylaxis with heparin subcu





Plan:


Patient will be continued on IV hydration with normal saline and antibiotics in 

the form of vancomycin.  Follow-up blood culture reports.  Ultrasound duplex of 

the right lower extremity negative for DVT.


Patient is still having right orbital swelling and infra patellar tenderness and

redness and swelling.  ID and orthopedic surgery is following. I&D of rt infra 

pattellar busrasa tomorro.w.


Continue with home medications and follow-up closely. 


Further recommendations based on clinical course.


Time with Patient: Greater than 30

## 2021-05-07 NOTE — P.CONS
History of Present Illness





- Reason for Consult


Consult date: 05/07/21


Right leg cellulitis  


Requesting physician: Pilar Hayden





- Chief Complaint


Right leg swelling and redness 1 week





- History of Present Illness


Patient is a 55-year-old  male presenting to the ER with right lower 

extremity pain swelling and redness, patient mentions started about a week ago 

without any history of any trauma he started more just below the right knee area

and then the subsequent spread to the right leg patient did complain of pain 

into the right leg to be more of a sharp in nature 45-10 and no radiation with 

associated swelling and redness patient has been evaluated in the outpatient 

setting was diagnosed with cellulitis and was treated with the Bactrim DS for 

about a week patient did have a follow-up with physician On 05/04/2021, patient 

was noticed to have no significant improvement subsequently the patient advised 

to go to the hospital, patient on presentation hospital was afebrile he did have

a normal white count, patient did have lower extremity Doppler was negative for 

DVT patient has been treated with IV antibiotic for 2 days did have some 

improvement infection disease was consulted last evening for further management 

of antibiotic therapy patient has already been evaluated by orthopedic with the 

plan for bursectomy in the morning and deep cultures





Review of Systems


Positive point has been  mentioned in the HPI rest of the systems are negative








Past Medical History


Past Medical History: Diabetes Mellitus, GERD/Reflux, Hyperlipidemia, 

Hypertension, Sleep Apnea/CPAP/BIPAP


Additional Past Medical History / Comment(s): Family history of premature 

coronary artery disease. OCD.


History of Any Multi-Drug Resistant Organisms: None Reported


Past Surgical History: Hernia Repair


Past Anesthesia/Blood Transfusion Reactions: No Reported Reaction


Past Psychological History: Anxiety, Bipolar, Depression, Schizoaffective 

Disorder


Smoking Status: Never smoker


Past Alcohol Use History: None Reported


Past Drug Use History: None Reported





- Past Family History


  ** Father


Family Medical History: Congestive Heart Failure (CHF), Coronary Artery Disease 

(CAD), Diabetes Mellitus





  ** Mother


Family Medical History: Cancer





Medications and Allergies


                                Home Medications











 Medication  Instructions  Recorded  Confirmed  Type


 


Glycopyrrolate [Robinul Forte] 2 mg PO BID 30 Days #60 tab 02/17/20 05/04/21 Rx


 


Levothyroxine Sodium [Synthroid] 50 mcg PO DAILY 30 Days #30 tab 02/17/20 05/04/21 Rx


 


Linagliptin [Tradjenta] 5 mg PO DAILY 30 Days #30 tab 02/17/20 05/04/21 Rx


 


Lithium Carbonate 1,200 mg PO HS 30 Days #120 cap 02/17/20 05/04/21 Rx


 


Propranolol HCl [Inderal Xl] 80 mg PO DAILY 30 Days #30 cap 02/17/20 05/04/21 Rx


 


Rosuvastatin Calcium [Crestor] 5 mg PO HS 30 Days #30 tab 02/17/20 05/04/21 Rx


 


cloZAPine [Clozaril] 150 mg PO HS 30 Days #45 tab 02/17/20 05/04/21 Rx


 


lisinopriL [Zestril] 20 mg PO DAILY 30 Days #30 tab 02/17/20 05/04/21 Rx


 


Cetirizine HCl [Zyrtec] 10 mg PO DAILY 05/04/21 05/04/21 History


 


Meclizine [Antivert] 25 mg PO BID 05/04/21 05/04/21 History


 


clomiPRAMINE [Anafranil] 100 mg PO BID 05/04/21 05/04/21 History


 


lamoTRIgine [LaMICtal] 150 mg PO BID 05/04/21 05/04/21 History








                                    Allergies











Allergy/AdvReac Type Severity Reaction Status Date / Time


 


Penicillins Allergy  Unknown Verified 05/04/21 14:53





   Childhood  


 


risperidone [From Risperdal] Allergy  Unknown Verified 05/04/21 14:53


 


divalproex sodium AdvReac  Anxiety, Verified 05/04/21 14:53





[From Depakote]   weight gain  














Physical Exam


Vitals: 


                                   Vital Signs











  Temp Pulse Resp BP Pulse Ox


 


 05/07/21 12:18  97.6 F  63  16  130/96  96


 


 05/07/21 08:00   61  14  


 


 05/07/21 05:03  97.6 F  61  14  123/77  92 L


 


 05/06/21 20:19  98.0 F  64  14  145/92  95








                                Intake and Output











 05/06/21 05/07/21 05/07/21





 22:59 06:59 14:59


 


Intake Total 1660 250 


 


Output Total   0


 


Balance 1660 250 0


 


Intake:   


 


  Intake, IV Titration 250 250 





  Amount   


 


    Vancomycin 1,500 mg In 250 250 





    Sodium Chloride 0.9% 250   





    ml @ 125 mls/hr IVPB Q12H   





    [facility] Rx#:[id number]   


 


  Oral 1410  


 


Output:   


 


  Stool   0


 


Other:   


 


  Voiding Method Toilet  Toilet


 


  # Voids 1 1 











GENERAL DESCRIPTION: Middle-aged male lying in bed, no distress. No tachypnea or

accessory muscle of respiration use.


HEENT: Shows Pallor , no scleral icterus. Oral mucous membrane is dry. No 

pharyngeal erythema or thrush


NECK: Trachea central, no thyromegaly.


LUNGS: Unlabored breathing. Clear to auscultation anteriorly. No wheeze or 

crackle.


HEART: S1, S2, regular rate and rhythm. No loud murmur


ABDOMEN: Soft, no tenderness , guarding or rigidity, no organomegaly


EXTREMITIES: Right lower extremity swelling and redness mostly marked at the i

nfrapatellar area was slightly warm to touch no drainage


SKIN: No rash, no masses palpable.


NEUROLOGICAL: The patient is awake, alert, oriented x3, mood and affect normal.

















Results


CBC & Chem 7: 


                                 05/07/21 09:24





                                 05/06/21 04:55


Labs: 


                  Abnormal Lab Results - Last 24 Hours (Table)











  05/07/21 Range/Units





  09:24 


 


RBC  4.11 L  (4.30-5.90)  m/uL


 


Hgb  12.3 L  (13.0-17.5)  gm/dL


 


Hct  36.2 L  (39.0-53.0)  %








                      Microbiology - Last 24 Hours (Table)











 05/04/21 14:10 Blood Culture - Preliminary





 Blood    No Growth after 48 hours


 


 05/04/21 14:31 Blood Culture - Preliminary





 Blood    No Growth after 48 hours














Assessment and Plan


Assessment: 


1-patient with right lower extremity cellulitis in this patient who did have a 

component of infrapatellar bursitis septic that has failed outpatient oral 

Bactrim DS therapy and minimal clinical response to the IV vancomycin with the 

patient is currently receiving for the last 3 days


2 patient with penicillin ALLERGY that would limit the number of antibiotic 

safety use-





(1) Infrapatellar bursitis of right knee


Current Visit: Yes   Status: Acute   Code(s): M70.51 - OTHER BURSITIS OF KNEE, 

RIGHT KNEE   SNOMED Code(s): 689227381


   





(2) Cellulitis, leg


Current Visit: Yes   Status: Acute   Code(s): L03.119 - CELLULITIS OF 

UNSPECIFIED PART OF LIMB   SNOMED Code(s): 094068524


   


Plan: 


1-Vancomycin pharmacy to dose target trough of 15 while watching  kidney 

function and Vanco trough closely


2-await I&D and deep cultures in the morning


3-discharge antibiotics will depend upon clinical response and cultures


We will follow on clinical condition and cultures to further adjust medication 

if needed


Thank you for this consultation will follow this patient with you

## 2021-05-07 NOTE — P.CNOR
History of Present Illness





- HPI


Consult date: 05/07/21


Requesting physician: Pilar Hayden


Consult reason: other (right knee patellar bursitis)


History of present illness: 


Patient is a 55-year-old male presenting to the hospital, referred from 

's office for cellulitis and right lower leg pain.  Patient failed 

antibiotic therapy with Bactrim.  one week ago this swelling and redness started

around his ankle and has since spread proximal to his knee.  He denies any 

trauma to this area and says he woke up one morning with this redness.  Patient 

denies any fever, nausea, vomiting, chest pain, shortness of breath.  Patient 

denies any numbness tingling in right leg.  Patient does have a history of 

diabetes.  He says he is not on insulin or any oral medications.  He says he 

controls diabetes with diet.  Patient denies any previous orthopedic surgeries. 

This redness and swelling is getting more prevalent to the knee.  








Review of Systems


See HPI





Constitutional: Reports as per HPI





Past Medical History


Past Medical History: Diabetes Mellitus, GERD/Reflux, Hyperlipidemia, 

Hypertension, Sleep Apnea/CPAP/BIPAP


Additional Past Medical History / Comment(s): Family history of premature 

coronary artery disease. OCD.


History of Any Multi-Drug Resistant Organisms: None Reported


Past Surgical History: Hernia Repair


Past Anesthesia/Blood Transfusion Reactions: No Reported Reaction


Past Psychological History: Anxiety, Bipolar, Depression, Schizoaffective 

Disorder


Smoking Status: Never smoker


Past Alcohol Use History: None Reported


Past Drug Use History: None Reported





- Past Family History


  ** Father


Family Medical History: Congestive Heart Failure (CHF), Coronary Artery Disease 

(CAD), Diabetes Mellitus





  ** Mother


Family Medical History: Cancer





Medications and Allergies


                                Home Medications











 Medication  Instructions  Recorded  Confirmed  Type


 


Glycopyrrolate [Robinul Forte] 2 mg PO BID 30 Days #60 tab 02/17/20 05/04/21 Rx


 


Levothyroxine Sodium [Synthroid] 50 mcg PO DAILY 30 Days #30 tab 02/17/20 05/04/21 Rx


 


Linagliptin [Tradjenta] 5 mg PO DAILY 30 Days #30 tab 02/17/20 05/04/21 Rx


 


Lithium Carbonate 1,200 mg PO HS 30 Days #120 cap 02/17/20 05/04/21 Rx


 


Propranolol HCl [Inderal Xl] 80 mg PO DAILY 30 Days #30 cap 02/17/20 05/04/21 Rx


 


Rosuvastatin Calcium [Crestor] 5 mg PO HS 30 Days #30 tab 02/17/20 05/04/21 Rx


 


cloZAPine [Clozaril] 150 mg PO HS 30 Days #45 tab 02/17/20 05/04/21 Rx


 


lisinopriL [Zestril] 20 mg PO DAILY 30 Days #30 tab 02/17/20 05/04/21 Rx


 


Cetirizine HCl [Zyrtec] 10 mg PO DAILY 05/04/21 05/04/21 History


 


Meclizine [Antivert] 25 mg PO BID 05/04/21 05/04/21 History


 


clomiPRAMINE [Anafranil] 100 mg PO BID 05/04/21 05/04/21 History


 


lamoTRIgine [LaMICtal] 150 mg PO BID 05/04/21 05/04/21 History








                                    Allergies











Allergy/AdvReac Type Severity Reaction Status Date / Time


 


Penicillins Allergy  Unknown Verified 05/04/21 14:53





   Childhood  


 


risperidone [From Risperdal] Allergy  Unknown Verified 05/04/21 14:53


 


divalproex sodium AdvReac  Anxiety, Verified 05/04/21 14:53





[From Depakote]   weight gain  














Physical Examination


Patient does have mild erythema to the infrapatellar region of the right knee.  

There is some mild fluctuance to infrapatellar region right knee.  Range of 

motion is good.  Flexion right knee about to 120; patient is able to extend 

knee -5.  He does flex and extend knee without any pain.  Neurovascular status 

intact dorsalis pedis pulse present bilaterally and feet.  Right knee strength 

4/5 on flexion and extension. Knee is mildly warm to touch.  Negative for any 

effusion.  Negative for joint line tenderness.  Negative valgus, varus stress 

test.  Negative Lachman's. 








Results





- Labs


Labs: 


                      Microbiology - Last 24 Hours (Table)











 05/04/21 14:10 Blood Culture - Preliminary





 Blood    No Growth after 48 hours


 


 05/04/21 14:31 Blood Culture - Preliminary





 Blood    No Growth after 48 hours








                                      H & H











  05/04/21 05/05/21 Range/Units





  14:10 05:34 


 


Hgb  12.6 L  11.8 L  (13.0-17.5)  gm/dL


 


Hct  35.5 L  35.1 L  (39.0-53.0)  %











Result Diagrams: 


                                 05/05/21 05:34





                                 05/06/21 04:55





Assessment and Plan


Plan: 


1.  Infrapatellar bursitis, right knee - plan for surgery tomorrow, 05/08/2021, 

8:00.  Incision and drainage of right knee planned with cultures.  Nothing by 

mouth after midnight. obtained consent. PT/INR ordered.  CBC with differential 

ordered 


2.  Appreciate medical management 


3.  Pain management -stable at this time 


4.  Multiple medical comorbidities


Time with Patient: Less than 30

## 2021-05-07 NOTE — P.PN
Subjective


Progress Note Date: 05/05/21








Patient is a 55-year-old male with a known history of diabetes type 2 

non-insulin-dependent, hypertension, hyperlipidemia, obstructive sleep apnea, 

GERD, anxiety/depression/bipolar/schizoaffective disorder presents to ER due to 

complaints of right leg swelling and redness.  Patient was seen at Dr. Adame's 

office and was referred to ER due to right lower extremity cellulitis and failed

antibiotic therapy with Bactrim.  Patient states that redness and pain started 

below his right knee about a week ago and was started on Bactrim as an 

outpatient.  Patient is having increasing leg swelling and redness extending to 

lower leg up to ankle.


Otherwise patient denied any complaints of fever or chills.  No nausea vomiting 

abdominal pain or diarrhea.  No injury.  No fall.  Patient is having increasing 

pain and tightness to the right lower leg.


Patient had x-ray of the right leg 4/28/2021 showed arthropathy.  Small amount 

of fluid in the suprapatellar bursa could be correlated with MRI as clinically 

warranted.


Laboratory data showed WBC 10.3, hemoglobin 12.6 and platelets 345


Sodium 140 potassium 4.2 chloride 106 BUN 16 and creatinine 1.26


COVID-19 PCR not detected and liver enzymes are not elevated





5/4/2021


Patient is currently lying in the bed comfortably.  Right lower extremity 

swelling and redness is slightly improved.  Patient is able to bear weight.  

Still having right infrapatellar region is indurated and tenderness with 

palpation.  Patient has been afebrile.  Continued on antibiotics of vancomycin. 

Blood cultures negative so far.


No complaints of chest pain or shortness of breath.  No nausea vomiting 

abdominal pain or diarrhea.


Right lower extremity duplex scan is negative for DVT.





Current medications reviewed.





Objective





- Vital Signs


Vital signs: 


                                   Vital Signs











Temp  98.4 F   05/05/21 12:07


 


Pulse  64   05/05/21 12:07


 


Resp  16   05/05/21 12:07


 


BP  116/79   05/05/21 12:07


 


Pulse Ox  92 L  05/05/21 12:07








                                 Intake & Output











 05/05/21 05/05/21 05/06/21





 06:59 18:59 06:59


 


Intake Total 2550 1800 


 


Output Total 0  


 


Balance 2550 1800 


 


Intake:   


 


  Intake, IV Titration 1050  





  Amount   


 


    Sodium Chloride 0.9% 1, 800  





    000 ml @ 100 mls/hr IV .   





    Q10H [facility] Rx#:[id number]   


 


    Vancomycin 1,500 mg In 250  





    Sodium Chloride 0.9% 250   





    ml @ 125 mls/hr IVPB Q12H   





    [facility] Rx#:[id number]   


 


  Oral 1500 1800 


 


Output:   


 


  Stool 0  


 


Other:   


 


  Voiding Method Toilet  


 


  # Voids  5 


 


  # Bowel Movements 0  














- Exam





PHYSICAL EXAMINATION: 


Patient is lying in the bed comfortably, no acute distress, awake alert and 

oriented.. 


HEENT: Normocephalic. Neck is supple. Pupils reactive. Nostrils clear. Oral 

cavity is moist. Ears reveal no drainage. 


Neck reveals no JVD, carotid bruits, or thyromegaly. 


CHEST EXAMINATION: Trachea is central. Symmetrical expansion. Lung fields clear 

to auscultation and percussion. 


CARDIAC: Normal S1, S2 with no gallops. No murmurs 


ABDOMEN: Soft. Bowel sounds normal. No organomegaly. No abdominal bruits. 


Extremities: Patient does have swelling of the right lower extremity and redness

extending below the knee and up to ankle.  Tenderness and warmth..  No clubbing 

or cyanosis


Tenderness and induration over the right infrapatellar region.


Neurologically awake, alert, oriented x3 with well-coordinated movements.  No 

focal deficits noted


Skin: No rash or skin lesions. 


Psychiatric: Coperative.  Nonsuicidal


Musculoskeletal: No joint swelling or deformity.  Normal range of motion.





- Labs


CBC & Chem 7: 


                                 05/05/21 05:34





                                 05/06/21 04:55


Labs: 


                  Abnormal Lab Results - Last 24 Hours (Table)











  05/05/21 05/05/21 Range/Units





  05:34 05:34 


 


RBC   3.97 L  (4.30-5.90)  m/uL


 


Hgb   11.8 L  (13.0-17.5)  gm/dL


 


Hct   35.1 L  (39.0-53.0)  %


 


Chloride  111 H   ()  mmol/L


 


Glucose  100 H   (74-99)  mg/dL








                      Microbiology - Last 24 Hours (Table)











 05/04/21 14:10 Blood Culture - Preliminary





 Blood    No Growth after 24 hours


 


 05/04/21 14:31 Blood Culture - Preliminary





 Blood    No Growth after 24 hours














Assessment and Plan


Assessment: 








Right lower extremity swelling/cellulitis failed outpatient therapy.  Ruled out 

DVT.


Diabetes type 2 non-insulin-dependent


Obstructive sleep apnea on CPAP


Hypertension


Hyperlipidemia


GERD


Anxiety, Bipolar, Depression, Schizoaffective Disorder


DVT prophylaxis with heparin subcu





Plan:


Patient will be continued on IV hydration with normal saline and antibiotics in 

the form of vancomycin.  Follow-up blood culture reports.  Ultrasound duplex of 

the right lower extremity negative for DVT.


Continue with home medications and follow-up closely. 


Further recommendations based on clinical course.


Time with Patient: Greater than 30

## 2021-05-08 LAB — GLUCOSE BLD-MCNC: 95 MG/DL (ref 75–99)

## 2021-05-08 PROCEDURE — 0M9N0ZX DRAINAGE OF RIGHT KNEE BURSA AND LIGAMENT, OPEN APPROACH, DIAGNOSTIC: ICD-10-PCS

## 2021-05-08 PROCEDURE — 0LBQ0ZZ EXCISION OF RIGHT KNEE TENDON, OPEN APPROACH: ICD-10-PCS

## 2021-05-08 RX ADMIN — LITHIUM CARBONATE SCH MG: 300 CAPSULE, GELATIN COATED ORAL at 20:33

## 2021-05-08 RX ADMIN — SODIUM CHLORIDE SCH MLS/HR: 9 INJECTION, SOLUTION INTRAVENOUS at 13:14

## 2021-05-08 RX ADMIN — MECLIZINE HYDROCHLORIDE SCH MG: 25 TABLET ORAL at 07:48

## 2021-05-08 RX ADMIN — HEPARIN SODIUM SCH UNIT: 5000 INJECTION INTRAVENOUS; SUBCUTANEOUS at 17:19

## 2021-05-08 RX ADMIN — GLYCOPYRROLATE SCH MG: 1 TABLET ORAL at 07:48

## 2021-05-08 RX ADMIN — HEPARIN SODIUM SCH UNIT: 5000 INJECTION INTRAVENOUS; SUBCUTANEOUS at 23:28

## 2021-05-08 RX ADMIN — ATORVASTATIN CALCIUM SCH MG: 10 TABLET, FILM COATED ORAL at 20:32

## 2021-05-08 RX ADMIN — PROPRANOLOL HYDROCHLORIDE SCH MG: 80 CAPSULE, EXTENDED RELEASE ORAL at 07:47

## 2021-05-08 RX ADMIN — GLYCOPYRROLATE SCH MG: 1 TABLET ORAL at 20:33

## 2021-05-08 RX ADMIN — SODIUM CHLORIDE SCH MLS/HR: 9 INJECTION, SOLUTION INTRAVENOUS at 23:28

## 2021-05-08 RX ADMIN — HEPARIN SODIUM SCH: 5000 INJECTION INTRAVENOUS; SUBCUTANEOUS at 07:27

## 2021-05-08 RX ADMIN — LEVOTHYROXINE SODIUM SCH MCG: 50 TABLET ORAL at 05:52

## 2021-05-08 RX ADMIN — MECLIZINE HYDROCHLORIDE SCH MG: 25 TABLET ORAL at 20:33

## 2021-05-08 NOTE — P.OP
Date of Procedure: 05/08/21


Preoperative Diagnosis: 


Infected right knee infrapatellar bursitis


Postoperative Diagnosis: 


Same


Procedure(s) Performed: 


Incision and drainage/irrigation and debridement right knee infrapatellar septic

bursitis


Anesthesia: BLANCA


Surgeon: Jeronimo Randhawa


Assistant #1: Bar Interiano


Estimated Blood Loss (ml): 10


Pathology: other (Gram stain/cultures)


Condition: stable


Disposition: PACU


Indications for Procedure: 


The patient's a 55-year-old male who presents with a one-week history of right 

leg cellulitis/infection/septic infrapatellar bursitis has been treated with 

oral antibiotics and most recently several days of IV antibiotic was persistence

of his symptoms.  A discussion of the risks and benefits of operative 

intervention was made with the patient.  He opted to proceed.  Risks of surgery 

to include persistence of infection and possible need for subsequent procedures 

was discussed.  Informed consent was obtained.


Operative Findings: 


As below


Description of Procedure: 


The patient was brought to the operating room, and after induction of general 

anesthesia the right lower extremity was prepped and draped in normal fashion.  

The tourniquet was inflated to 270 mm retro-.  A 5 cm incision was then made 

centered starting at the inferior pole patella extending distally.  Skin was 

incised sharply.  Subcu tissues were divided sharply.  Electrocautery was used 

for hemostasis.  Significant bursal thickening was noted.  There is no definite 

purulence.  Deep cultures were obtained.  The bursal tissue was sharply excised 

with a scalpel down to the level of the patellar tendon.  Pulsatile lavage was 

then utilized copiously.  The skin was then loosely reapproximated over a 

Penrose drain with simple 3-0 nylon sutures.  A sterile dressing was applied.  

The tourniquet was deflated with less than 30 minutes total tourniquet time.  

The patient was awoken from general anesthesia and transferred to recovery room 

in good condition.  Blood loss was estimated at 10 mL.  No complications were 

incurred.  Sponge and needle counts were correct at the end of the case.

## 2021-05-09 LAB
ANION GAP SERPL CALC-SCNC: 6 MMOL/L
BASOPHILS # BLD AUTO: 0 K/UL (ref 0–0.2)
BASOPHILS NFR BLD AUTO: 0 %
BUN SERPL-SCNC: 21 MG/DL (ref 9–20)
CALCIUM SPEC-MCNC: 9.3 MG/DL (ref 8.4–10.2)
CHLORIDE SERPL-SCNC: 107 MMOL/L (ref 98–107)
CO2 SERPL-SCNC: 28 MMOL/L (ref 22–30)
EOSINOPHIL # BLD AUTO: 0.1 K/UL (ref 0–0.7)
EOSINOPHIL NFR BLD AUTO: 1 %
ERYTHROCYTE [DISTWIDTH] IN BLOOD BY AUTOMATED COUNT: 4.16 M/UL (ref 4.3–5.9)
ERYTHROCYTE [DISTWIDTH] IN BLOOD: 12.9 % (ref 11.5–15.5)
GLUCOSE SERPL-MCNC: 163 MG/DL (ref 74–99)
HCT VFR BLD AUTO: 36.5 % (ref 39–53)
HGB BLD-MCNC: 12.5 GM/DL (ref 13–17.5)
LYMPHOCYTES # SPEC AUTO: 0.9 K/UL (ref 1–4.8)
LYMPHOCYTES NFR SPEC AUTO: 11 %
MCH RBC QN AUTO: 30.2 PG (ref 25–35)
MCHC RBC AUTO-ENTMCNC: 34.3 G/DL (ref 31–37)
MCV RBC AUTO: 87.8 FL (ref 80–100)
MONOCYTES # BLD AUTO: 0.3 K/UL (ref 0–1)
MONOCYTES NFR BLD AUTO: 4 %
NEUTROPHILS # BLD AUTO: 6.7 K/UL (ref 1.3–7.7)
NEUTROPHILS NFR BLD AUTO: 84 %
PLATELET # BLD AUTO: 295 K/UL (ref 150–450)
POTASSIUM SERPL-SCNC: 3.7 MMOL/L (ref 3.5–5.1)
SODIUM SERPL-SCNC: 141 MMOL/L (ref 137–145)
WBC # BLD AUTO: 8 K/UL (ref 3.8–10.6)

## 2021-05-09 RX ADMIN — GLYCOPYRROLATE SCH MG: 1 TABLET ORAL at 09:39

## 2021-05-09 RX ADMIN — HEPARIN SODIUM SCH UNIT: 5000 INJECTION INTRAVENOUS; SUBCUTANEOUS at 15:55

## 2021-05-09 RX ADMIN — HEPARIN SODIUM SCH UNIT: 5000 INJECTION INTRAVENOUS; SUBCUTANEOUS at 09:38

## 2021-05-09 RX ADMIN — LITHIUM CARBONATE SCH MG: 300 CAPSULE, GELATIN COATED ORAL at 21:29

## 2021-05-09 RX ADMIN — ACETAMINOPHEN PRN MG: 325 TABLET, FILM COATED ORAL at 20:12

## 2021-05-09 RX ADMIN — SODIUM CHLORIDE SCH MLS/HR: 9 INJECTION, SOLUTION INTRAVENOUS at 12:18

## 2021-05-09 RX ADMIN — LEVOTHYROXINE SODIUM SCH MCG: 50 TABLET ORAL at 05:39

## 2021-05-09 RX ADMIN — MECLIZINE HYDROCHLORIDE SCH MG: 25 TABLET ORAL at 09:39

## 2021-05-09 RX ADMIN — PROPRANOLOL HYDROCHLORIDE SCH MG: 80 CAPSULE, EXTENDED RELEASE ORAL at 09:40

## 2021-05-09 RX ADMIN — GLYCOPYRROLATE SCH MG: 1 TABLET ORAL at 21:32

## 2021-05-09 RX ADMIN — MECLIZINE HYDROCHLORIDE SCH MG: 25 TABLET ORAL at 21:32

## 2021-05-09 RX ADMIN — ATORVASTATIN CALCIUM SCH MG: 10 TABLET, FILM COATED ORAL at 21:29

## 2021-05-09 NOTE — P.PN
Subjective


Progress Note Date: 05/09/21


Principal diagnosis: 





Right lower extremity swelling/cellulitis








Patient is a 55-year-old male with a known history of diabetes type 2 

non-insulin-dependent, hypertension, hyperlipidemia, obstructive sleep apnea, 

GERD, anxiety/depression/bipolar/schizoaffective disorder presents to ER due to 

complaints of right leg swelling and redness.  Patient was seen at Dr. Adame's 

office and was referred to ER due to right lower extremity cellulitis and failed

antibiotic therapy with Bactrim.  Patient states that redness and pain started 

below his right knee about a week ago and was started on Bactrim as an 

outpatient.  Patient is having increasing leg swelling and redness extending to 

lower leg up to ankle.


Otherwise patient denied any complaints of fever or chills.  No nausea vomiting 

abdominal pain or diarrhea.  No injury.  No fall.  Patient is having increasing 

pain and tightness to the right lower leg.


Patient had x-ray of the right leg 4/28/2021 showed arthropathy.  Small amount 

of fluid in the suprapatellar bursa could be correlated with MRI as clinically 

warranted.


Laboratory data showed WBC 10.3, hemoglobin 12.6 and platelets 345


Sodium 140 potassium 4.2 chloride 106 BUN 16 and creatinine 1.26


COVID-19 PCR not detected and liver enzymes are not elevated





5/5/2021


Patient is currently lying in the bed comfortably.  Right lower extremity 

swelling and redness is slightly improved.  Patient is able to bear weight.  

Still having right infrapatellar region is indurated and tenderness with 

palpation.  Patient has been afebrile.  Continued on antibiotics of vancomycin. 

Blood cultures negative so far.


No complaints of chest pain or shortness of breath.  No nausea vomiting 

abdominal pain or diarrhea.


Right lower extremity duplex scan is negative for DVT.





5/6/2021


Patient is resting in the bed awake alert 1x3.  Patient is still having right 

lower extremity swelling and redness and also tenderness of the right infra 

patella region.  Otherwise patient is afebrile.  Still unable to bear weight 

completely.  Continued on antibiotics and home vancomycin.


ID and orthopedic surgery will be consulted and continue with pain management.


Denies any chest pain or shortness of breath.  Patient has been afebrile.  No 

nausea vomiting or abdominal pain or diarrhea.





5/7/2021


Patient is currently lying in the bed comfortably.  Still having right leg 

swelling and redness is improving.  Right infrapatellar tenderness is not 

improving.  Patient was seen by orthopedic surgery and is planning for I&D 

tomorrow.  Follow-up culture reports.  Continue with antibiotics in the form of 

vancomycin.  Patient is allergic to penicillin.  ID is on board.  Otherwise 

patient has been afebrile.  Nothing by mouth and repeat labs tomorrow.


No complaints of chest pain or shortness breath.  No nausea vomiting abdominal 

pain or diarrhea.  No headache or dizziness or lightheadedness.





5/8/2021


Patient is currently lying in the bed.  Awake alert oriented x3.  Patient 

underwent I&D and irrigation and debridement of right knee infrapatellar septic 

bursitis.  Follow-up culture reports.  Patient is being continued on antibiotics

in the form of vancomycin.  ID is on board.  Pain is controlled.  Right lower 

extremity redness is improving.


No complaints of chest pain or shortness of.  No fever no chills.  No headache 

or dizziness lightheadedness.


Laboratory showed creatinine 1.22





5/9/2021


Patient is currently lying in bed.  Able to ambulate to the bathroom.  Pain in 

the right patella area is improving.  Right leg redness is also improving.


Patient has been afebrile.  No nausea vomiting abdominal pain or diarrhea.  No 

chest pain or shortness of breath.


Patient is status post I&D and irrigation of right patella bursitis.  Follow-up 

culture reports.  Continued on antibiotics in the form of vancomycin.





Laboratory data showed WBC 8.0 hemoglobin 12.5


BUN 21 creatinine 1.24 and vancomycin trough level is 17.7








Current medications reviewed.





Objective





- Vital Signs


Vital signs: 


                                   Vital Signs











Temp  98.0 F   05/09/21 20:15


 


Pulse  85   05/09/21 20:15


 


Resp  16   05/09/21 20:15


 


BP  165/99   05/09/21 20:15


 


Pulse Ox  96   05/09/21 20:15








                                 Intake & Output











 05/09/21 05/09/21 05/10/21





 06:59 18:59 06:59


 


Intake Total 1210 250 


 


Balance 1210 250 


 


Intake:   


 


  Intake, IV Titration 250 250 





  Amount   


 


    Vancomycin 1,250 mg In  250 





    Sodium Chloride 0.9% 250   





    ml @ 125 mls/hr IVPB Q12H   





    [facility] Rx#:[id number]   


 


    Vancomycin 1,500 mg In 250  





    Sodium Chloride 0.9% 250   





    ml @ 125 mls/hr IVPB Q12H   





    [facility] Rx#:[id number]   


 


  Oral 960  


 


Other:   


 


  Voiding Method  Toilet 


 


  # Voids 1  














- Exam





PHYSICAL EXAMINATION: 


Patient is lying in the bed comfortably, no acute distress, awake alert and 

oriented.. 


HEENT: Normocephalic. Neck is supple. Pupils reactive. Nostrils clear. Oral 

cavity is moist. Ears reveal no drainage. 


Neck reveals no JVD, carotid bruits, or thyromegaly. 


CHEST EXAMINATION: Trachea is central. Symmetrical expansion. Lung fields clear 

to auscultation and percussion. 


CARDIAC: Normal S1, S2 with no gallops. No murmurs 


ABDOMEN: Soft. Bowel sounds normal. No organomegaly. No abdominal bruits. 


Extremities:   No clubbing or cyanosis. Right lower extremity redness is 

improving.  Minimal right infrapatellar tenderness.


Neurologically awake, alert, oriented x3 with well-coordinated movements.  No 

focal deficits noted


Skin: No rash or skin lesions. 


Psychiatric: Coperative.  Nonsuicidal


Musculoskeletal: No joint swelling or deformity.  Normal range of motion.





- Labs


CBC & Chem 7: 


                                 05/09/21 11:16





                                 05/09/21 11:16


Labs: 


                  Abnormal Lab Results - Last 24 Hours (Table)











  05/09/21 05/09/21 Range/Units





  11:16 11:16 


 


RBC  4.16 L   (4.30-5.90)  m/uL


 


Hgb  12.5 L   (13.0-17.5)  gm/dL


 


Hct  36.5 L   (39.0-53.0)  %


 


Lymphocytes #  0.9 L   (1.0-4.8)  k/uL


 


BUN   21 H  (9-20)  mg/dL


 


Glucose   163 H  (74-99)  mg/dL








                      Microbiology - Last 24 Hours (Table)











 05/04/21 14:31 Blood Culture - Preliminary





 Blood    No Growth after 120 hours


 


 05/04/21 14:10 Blood Culture - Preliminary





 Blood    No Growth after 120 hours


 


 05/08/21 12:00 Gram Stain - Preliminary





 Knee - Right Wound Culture - Preliminary


 


 05/08/21 12:00 Gram Stain - Preliminary





 Knee - Right Wound Culture - Preliminary














Assessment and Plan


Assessment: 








Right lower extremity swelling/cellulitis failed outpatient therapy.  Ruled out 

DVT.Unlikely related to diabetes.


Right infrapatellar  septic bursitis.  Status post I&D and irrigation.  Follow-

up culture report.


Diabetes type 2 non-insulin-dependent


Obstructive sleep apnea on CPAP


Hypertension


Hyperlipidemia


GERD


Anxiety, Bipolar, Depression, Schizoaffective Disorder


DVT prophylaxis with heparin subcu





Plan:


Patient will be continued on IV hydration with normal saline and antibiotics in 

the form of vancomycin.  Follow-up blood culture reports.  Ultrasound duplex of 

the right lower extremity negative for DVT.


Patient is still having right infra patellar tenderness and redness and 

swelling.  ID and orthopedic surgery is following. I&D of rt infra pattellar 

busrasa done on 4/8. followup cultures.


Continue with home medications and follow-up closely. 


Further recommendations based on clinical course.


Time with Patient: Greater than 30

## 2021-05-09 NOTE — P.PN
Subjective


Progress Note Date: 05/09/21


Principal diagnosis: 





infected right knee infrapatellar bursitis


upon entering room this morning, patient was an getting around the room and 

sitting down on bed without issue.  Patient says he is in minimal pain and doing

well from surgery yesterday.   He is able to flex and extend knee without pain. 

 He says he has been eating food and has had bowel movements as well.   Patient 

denies any fever, chills, change in vision, chest pain, shortness of breath.   

Patient denies any saddle anesthesia.








Objective





- Vital Signs


Vital signs: 


                                   Vital Signs











Temp  98.2 F   05/09/21 11:24


 


Pulse  81   05/09/21 11:24


 


Resp  20   05/09/21 11:24


 


BP  151/91   05/09/21 11:24


 


Pulse Ox  95   05/09/21 11:24








                                 Intake & Output











 05/08/21 05/09/21 05/09/21





 18:59 06:59 18:59


 


Intake Total 1101 1210 


 


Output Total 3  


 


Balance 1098 1210 


 


Intake:   


 


    


 


  Intake, IV Titration 250 250 





  Amount   


 


    Vancomycin 1,500 mg In 250 250 





    Sodium Chloride 0.9% 250   





    ml @ 125 mls/hr IVPB Q12H   





    [facility] Rx#:[id number]   


 


  Oral  960 


 


Output:   


 


  Estimated Blood Loss 3  


 


Other:   


 


  Voiding Method   Toilet


 


  # Voids  1 














- Exam


on exam, patient is able to flex and extend knee without pain.  Dorsalis pedis 

pulses are present 2+ bilaterally.  Negative Homans bilaterally.  sensation is 

intact in  feet bilaterally. Patient is able to ambulate independently around 

the room. Ace wrap was partially removed as well as dressings over incision to 

expose some incision.  Incision appears to be intact with minimal 

serosanguineous drainage from the drain. 








- Labs


CBC & Chem 7: 


                                 05/09/21 11:16





                                 05/09/21 11:16


Labs: 


                  Abnormal Lab Results - Last 24 Hours (Table)











  05/09/21 05/09/21 Range/Units





  11:16 11:16 


 


RBC  4.16 L   (4.30-5.90)  m/uL


 


Hgb  12.5 L   (13.0-17.5)  gm/dL


 


Hct  36.5 L   (39.0-53.0)  %


 


Lymphocytes #  0.9 L   (1.0-4.8)  k/uL


 


BUN   21 H  (9-20)  mg/dL


 


Glucose   163 H  (74-99)  mg/dL








                      Microbiology - Last 24 Hours (Table)











 05/08/21 12:00 Gram Stain - Preliminary





 Knee - Right Wound Culture - Preliminary


 


 05/08/21 12:00 Gram Stain - Preliminary





 Knee - Right Wound Culture - Preliminary


 


 05/08/21 12:00 Anaerobic Culture - Preliminary





 Knee - Right 


 


 05/08/21 12:00 Fungal Culture - Preliminary





 Knee - Right 


 


 05/08/21 12:00 Fungal Culture - Preliminary





 Knee - Right 


 


 05/08/21 12:00 Anaerobic Culture - Preliminary





 Knee - Right 


 


 05/04/21 14:10 Blood Culture - Preliminary





 Blood    No Growth after 96 hours


 


 05/04/21 14:31 Blood Culture - Preliminary





 Blood    No Growth after 96 hours














Assessment and Plan


Assessment: 


infected right knee infrapatellar bursitis





Plan: 


1. infected right knee infrapatellar bursitis - incision and drainage performed 

yesterday, 05/08/2021.   Cultures taken.  awaiting results from lab


2.  pain management -  stable at this time


3.  GI prophylaxis/DVT prophylaxis -  continue heparin while inpatient


4.  PT/OT


5.  discharge planning





Time with Patient: Less than 30

## 2021-05-09 NOTE — P.PN
Subjective


Progress Note Date: 05/08/21


Principal diagnosis: 





Right lower extremity swelling/cellulitis








Patient is a 55-year-old male with a known history of diabetes type 2 

non-insulin-dependent, hypertension, hyperlipidemia, obstructive sleep apnea, 

GERD, anxiety/depression/bipolar/schizoaffective disorder presents to ER due to 

complaints of right leg swelling and redness.  Patient was seen at Dr. Adame's 

office and was referred to ER due to right lower extremity cellulitis and failed

antibiotic therapy with Bactrim.  Patient states that redness and pain started 

below his right knee about a week ago and was started on Bactrim as an 

outpatient.  Patient is having increasing leg swelling and redness extending to 

lower leg up to ankle.


Otherwise patient denied any complaints of fever or chills.  No nausea vomiting 

abdominal pain or diarrhea.  No injury.  No fall.  Patient is having increasing 

pain and tightness to the right lower leg.


Patient had x-ray of the right leg 4/28/2021 showed arthropathy.  Small amount 

of fluid in the suprapatellar bursa could be correlated with MRI as clinically 

warranted.


Laboratory data showed WBC 10.3, hemoglobin 12.6 and platelets 345


Sodium 140 potassium 4.2 chloride 106 BUN 16 and creatinine 1.26


COVID-19 PCR not detected and liver enzymes are not elevated





5/5/2021


Patient is currently lying in the bed comfortably.  Right lower extremity 

swelling and redness is slightly improved.  Patient is able to bear weight.  

Still having right infrapatellar region is indurated and tenderness with 

palpation.  Patient has been afebrile.  Continued on antibiotics of vancomycin. 

Blood cultures negative so far.


No complaints of chest pain or shortness of breath.  No nausea vomiting 

abdominal pain or diarrhea.


Right lower extremity duplex scan is negative for DVT.





5/6/2021


Patient is resting in the bed awake alert 1x3.  Patient is still having right 

lower extremity swelling and redness and also tenderness of the right infra 

patella region.  Otherwise patient is afebrile.  Still unable to bear weight 

completely.  Continued on antibiotics and home vancomycin.


ID and orthopedic surgery will be consulted and continue with pain management.


Denies any chest pain or shortness of breath.  Patient has been afebrile.  No 

nausea vomiting or abdominal pain or diarrhea.





5/7/2021


Patient is currently lying in the bed comfortably.  Still having right leg 

swelling and redness is improving.  Right infrapatellar tenderness is not 

improving.  Patient was seen by orthopedic surgery and is planning for I&D 

tomorrow.  Follow-up culture reports.  Continue with antibiotics in the form of 

vancomycin.  Patient is allergic to penicillin.  ID is on board.  Otherwise 

patient has been afebrile.  Nothing by mouth and repeat labs tomorrow.


No complaints of chest pain or shortness breath.  No nausea vomiting abdominal 

pain or diarrhea.  No headache or dizziness or lightheadedness.





5/8/2021


Patient is currently lying in the bed.  Awake alert oriented x3.  Patient 

underwent I&D and irrigation and debridement of right knee infrapatellar septic 

bursitis.  Follow-up culture reports.  Patient is being continued on antibiotics

in the form of vancomycin.  ID is on board.  Pain is controlled.  Right lower 

extremity redness is improving.


No complaints of chest pain or shortness of.  No fever no chills.  No headache 

or dizziness lightheadedness.


Laboratory showed creatinine 1.22





Current medications reviewed.





Objective





- Vital Signs


Vital signs: 


                                   Vital Signs











Temp  97.1 F L  05/08/21 12:10


 


Pulse  67   05/08/21 12:59


 


Resp  16   05/08/21 12:59


 


BP  150/99   05/08/21 12:59


 


Pulse Ox  100   05/08/21 12:59








                                 Intake & Output











 05/07/21 05/08/21 05/08/21





 18:59 06:59 18:59


 


Intake Total  250 851


 


Output Total 0  3


 


Balance 0 250 848


 


Intake:   


 


  IV   851


 


  Intake, IV Titration  250 





  Amount   


 


    Vancomycin 1,500 mg In  250 





    Sodium Chloride 0.9% 250   





    ml @ 125 mls/hr IVPB Q12H   





    [facility] Rx#:[id number]   


 


Output:   


 


  Stool 0  


 


  Estimated Blood Loss   3


 


Other:   


 


  Voiding Method Toilet  


 


  # Voids 1 3 














- Exam





PHYSICAL EXAMINATION: 


Patient is lying in the bed comfortably, no acute distress, awake alert and 

oriented.. 


HEENT: Normocephalic. Neck is supple. Pupils reactive. Nostrils clear. Oral 

cavity is moist. Ears reveal no drainage. 


Neck reveals no JVD, carotid bruits, or thyromegaly. 


CHEST EXAMINATION: Trachea is central. Symmetrical expansion. Lung fields clear 

to auscultation and percussion. 


CARDIAC: Normal S1, S2 with no gallops. No murmurs 


ABDOMEN: Soft. Bowel sounds normal. No organomegaly. No abdominal bruits. 


Extremities: Patient does have swelling of the right lower extremity and redness

extending below the knee and up to ankle.  Tenderness and warmth..  No clubbing 

or cyanosis


Tenderness and induration over the right infrapatellar region.


Neurologically awake, alert, oriented x3 with well-coordinated movements.  No 

focal deficits noted


Skin: No rash or skin lesions. 


Psychiatric: Coperative.  Nonsuicidal


Musculoskeletal: No joint swelling or deformity.  Normal range of motion.





- Labs


CBC & Chem 7: 


                                 05/09/21 11:16





                                 05/09/21 11:16


Labs: 


                  Abnormal Lab Results - Last 24 Hours (Table)











  05/07/21 Range/Units





  09:24 


 


ESR  19 H  (0-15)  mm/hr








                      Microbiology - Last 24 Hours (Table)











 05/04/21 14:31 Blood Culture - Preliminary





 Blood    No Growth after 72 hours


 


 05/04/21 14:10 Blood Culture - Preliminary





 Blood    No Growth after 72 hours














Assessment and Plan


Assessment: 








Right lower extremity swelling/cellulitis failed outpatient therapy.  Ruled out 

DVT.Unlikely related to diabetes.


Right infrapatellar  possible septic bursitis


Diabetes type 2 non-insulin-dependent


Obstructive sleep apnea on CPAP


Hypertension


Hyperlipidemia


GERD


Anxiety, Bipolar, Depression, Schizoaffective Disorder


DVT prophylaxis with heparin subcu





Plan:


Patient will be continued on IV hydration with normal saline and antibiotics in 

the form of vancomycin.  Follow-up blood culture reports.  Ultrasound duplex of 

the right lower extremity negative for DVT.


Patient is still having right infra patellar tenderness and redness and 

swelling.  ID and orthopedic surgery is following. I&D of rt infra pattellar 

busrasa done today. followup cultures.


Continue with home medications and follow-up closely. 


Further recommendations based on clinical course.


Time with Patient: Greater than 30

## 2021-05-09 NOTE — PN
PROGRESS NOTE



DATE OF SERVICE:

05/09/2021



REASON FOR FOLLOWUP:

Right knee infrapatellar bursitis.



INTERVAL HISTORY:

Patient is currently afebrile.  Patient is status post I and D of the right bursa.

Patient tolerated the procedure.  Pain is currently controlled.  No chest pain,

shortness of breath or cough.  No abdominal pain or diarrhea.



PHYSICAL EXAMINATION:

Blood pressure 151/91 with a pulse of 81, temperature 98.2.  He is 95% on room air.

General description is a middle-aged male lying in bed in no distress.

Respiratory system: Unlabored breathing, clear to auscultation anteriorly.

HEART S1, S2.  Regular rate and rhythm.  Abdomen soft, no tenderness.

Right knee is currently dressed up.  No obvious drainage on the dressing.



LABORATORY STUDIES:

Hemoglobin is 12.5, white count 8.0, BUN of 21, creatinine 1.24.  Cultures currently

pending.



DIAGNOSTIC IMPRESSION AND PLAN:

Patient with right knee infrapatellar bursitis status post I and D and debridement of

the right knee bursa. Cultures are pending.  Patient is covered with vancomycin to

continue.  Discharge antibiotics depending upon clinical response and cultures.

Continue supportive care.





MMODL / IJN: [id number] / Job#: [id number]

## 2021-05-10 RX ADMIN — HEPARIN SODIUM SCH UNIT: 5000 INJECTION INTRAVENOUS; SUBCUTANEOUS at 15:11

## 2021-05-10 RX ADMIN — HEPARIN SODIUM SCH UNIT: 5000 INJECTION INTRAVENOUS; SUBCUTANEOUS at 00:00

## 2021-05-10 RX ADMIN — LEVOTHYROXINE SODIUM SCH MCG: 50 TABLET ORAL at 05:54

## 2021-05-10 RX ADMIN — SODIUM CHLORIDE SCH MLS/HR: 9 INJECTION, SOLUTION INTRAVENOUS at 00:00

## 2021-05-10 RX ADMIN — HEPARIN SODIUM SCH UNIT: 5000 INJECTION INTRAVENOUS; SUBCUTANEOUS at 08:26

## 2021-05-10 RX ADMIN — GLYCOPYRROLATE SCH MG: 1 TABLET ORAL at 08:26

## 2021-05-10 RX ADMIN — SODIUM CHLORIDE SCH MLS/HR: 9 INJECTION, SOLUTION INTRAVENOUS at 11:42

## 2021-05-10 RX ADMIN — LITHIUM CARBONATE SCH MG: 300 CAPSULE, GELATIN COATED ORAL at 21:37

## 2021-05-10 RX ADMIN — PROPRANOLOL HYDROCHLORIDE SCH MG: 80 CAPSULE, EXTENDED RELEASE ORAL at 08:26

## 2021-05-10 RX ADMIN — MECLIZINE HYDROCHLORIDE SCH MG: 25 TABLET ORAL at 21:37

## 2021-05-10 RX ADMIN — GLYCOPYRROLATE SCH MG: 1 TABLET ORAL at 21:37

## 2021-05-10 RX ADMIN — MECLIZINE HYDROCHLORIDE SCH MG: 25 TABLET ORAL at 08:26

## 2021-05-10 RX ADMIN — ATORVASTATIN CALCIUM SCH MG: 10 TABLET, FILM COATED ORAL at 21:38

## 2021-05-10 NOTE — P.PN
Subjective


Progress Note Date: 05/10/21


Principal diagnosis: 





infected right knee infrapatellar bursitis


upon entering room this morning, patient was an getting around the room and 

sitting down on bed without issue.  Patient says he is in minimal pain and doing

well from surgery.   He is able to flex and extend knee without pain.   He says 

he has been eating food and has had bowel movements as well.   Patient denies a

ny fever, chills, change in vision, chest pain, shortness of breath.   Patient 

denies any saddle anesthesia.








Objective





- Vital Signs


Vital signs: 


                                   Vital Signs











Temp  98.2 F   05/10/21 04:20


 


Pulse  67   05/10/21 04:20


 


Resp  16   05/10/21 04:20


 


BP  142/87   05/10/21 04:20


 


Pulse Ox  92 L  05/10/21 04:20








                                 Intake & Output











 05/09/21 05/10/21 05/10/21





 18:59 06:59 18:59


 


Intake Total 250 03942 


 


Output Total  0 


 


Balance 250 77344 


 


Intake:   


 


  Intake, IV Titration 250  





  Amount   


 


    Vancomycin 1,250 mg In 250  





    Sodium Chloride 0.9% 250   





    ml @ 125 mls/hr IVPB Q12H   





    [facility] Rx#:[id number]   


 


  Oral  71614 


 


Output:   


 


  Stool  0 


 


Other:   


 


  Voiding Method Toilet Toilet 


 


  # Voids  4 














- Exam


on exam, patient is able to flex and extend knee without pain.  Dorsalis pedis 

pulses are present 2+ bilaterally.  Negative Homans bilaterally.  sensation is 

intact in  feet bilaterally. Patient is able to ambulate independently around 

the room. Ace wrap was removed as well as Kerlix bandage and ABDs.  Incision 

appears to be intact with minimal serosanguineous drainage from the drain.  

Drain was removed.  Adaptics was placed over incision as well as Telfa and 

nonstick dressings.  New Ace wrap was applied. 








- Labs


CBC & Chem 7: 


                                 05/09/21 11:16





                                 05/09/21 11:16


Labs: 


                  Abnormal Lab Results - Last 24 Hours (Table)











  05/09/21 05/09/21 Range/Units





  11:16 11:16 


 


RBC  4.16 L   (4.30-5.90)  m/uL


 


Hgb  12.5 L   (13.0-17.5)  gm/dL


 


Hct  36.5 L   (39.0-53.0)  %


 


Lymphocytes #  0.9 L   (1.0-4.8)  k/uL


 


BUN   21 H  (9-20)  mg/dL


 


Glucose   163 H  (74-99)  mg/dL








                      Microbiology - Last 24 Hours (Table)











 05/04/21 14:31 Blood Culture - Preliminary





 Blood    No Growth after 120 hours


 


 05/04/21 14:10 Blood Culture - Preliminary





 Blood    No Growth after 120 hours


 


 05/08/21 12:00 Gram Stain - Preliminary





 Knee - Right Wound Culture - Preliminary


 


 05/08/21 12:00 Gram Stain - Preliminary





 Knee - Right Wound Culture - Preliminary














Assessment and Plan


Assessment: 


infected right knee infrapatellar bursitis





Plan: 


1. infected right knee infrapatellar bursitis - incision and drainage performed 

05/08/2021.   Cultures taken.  awaiting results from lab.  Drain was removed.  

Adaptic and Telfa was applied over the incision as well as new Ace wrap


2.  pain management -  stable at this time


3.  GI prophylaxis/DVT prophylaxis -  continue heparin while inpatient


4.  PT/OT


5.  discharge planning





Time with Patient: Less than 30

## 2021-05-11 LAB
ANION GAP SERPL CALC-SCNC: 6 MMOL/L
BASOPHILS # BLD AUTO: 0 K/UL (ref 0–0.2)
BASOPHILS NFR BLD AUTO: 0 %
BUN SERPL-SCNC: 22 MG/DL (ref 9–20)
CALCIUM SPEC-MCNC: 9.3 MG/DL (ref 8.4–10.2)
CHLORIDE SERPL-SCNC: 108 MMOL/L (ref 98–107)
CO2 SERPL-SCNC: 28 MMOL/L (ref 22–30)
EOSINOPHIL # BLD AUTO: 0 K/UL (ref 0–0.7)
EOSINOPHIL NFR BLD AUTO: 0 %
ERYTHROCYTE [DISTWIDTH] IN BLOOD BY AUTOMATED COUNT: 3.88 M/UL (ref 4.3–5.9)
ERYTHROCYTE [DISTWIDTH] IN BLOOD: 13.3 % (ref 11.5–15.5)
GLUCOSE SERPL-MCNC: 109 MG/DL (ref 74–99)
HCT VFR BLD AUTO: 34 % (ref 39–53)
HGB BLD-MCNC: 11.9 GM/DL (ref 13–17.5)
LYMPHOCYTES # SPEC AUTO: 1 K/UL (ref 1–4.8)
LYMPHOCYTES NFR SPEC AUTO: 10 %
MCH RBC QN AUTO: 30.6 PG (ref 25–35)
MCHC RBC AUTO-ENTMCNC: 34.9 G/DL (ref 31–37)
MCV RBC AUTO: 87.6 FL (ref 80–100)
MONOCYTES # BLD AUTO: 0.5 K/UL (ref 0–1)
MONOCYTES NFR BLD AUTO: 5 %
NEUTROPHILS # BLD AUTO: 8.1 K/UL (ref 1.3–7.7)
NEUTROPHILS NFR BLD AUTO: 84 %
PLATELET # BLD AUTO: 286 K/UL (ref 150–450)
POTASSIUM SERPL-SCNC: 3.5 MMOL/L (ref 3.5–5.1)
SODIUM SERPL-SCNC: 142 MMOL/L (ref 137–145)
WBC # BLD AUTO: 9.7 K/UL (ref 3.8–10.6)

## 2021-05-11 RX ADMIN — GLYCOPYRROLATE SCH MG: 1 TABLET ORAL at 08:13

## 2021-05-11 RX ADMIN — ATORVASTATIN CALCIUM SCH MG: 10 TABLET, FILM COATED ORAL at 20:48

## 2021-05-11 RX ADMIN — HEPARIN SODIUM SCH UNIT: 5000 INJECTION INTRAVENOUS; SUBCUTANEOUS at 22:50

## 2021-05-11 RX ADMIN — MECLIZINE HYDROCHLORIDE SCH MG: 25 TABLET ORAL at 20:49

## 2021-05-11 RX ADMIN — GLYCOPYRROLATE SCH MG: 1 TABLET ORAL at 20:48

## 2021-05-11 RX ADMIN — LITHIUM CARBONATE SCH MG: 300 CAPSULE, GELATIN COATED ORAL at 20:47

## 2021-05-11 RX ADMIN — SODIUM CHLORIDE SCH MLS/HR: 9 INJECTION, SOLUTION INTRAVENOUS at 00:12

## 2021-05-11 RX ADMIN — MECLIZINE HYDROCHLORIDE SCH MG: 25 TABLET ORAL at 08:13

## 2021-05-11 RX ADMIN — HEPARIN SODIUM SCH UNIT: 5000 INJECTION INTRAVENOUS; SUBCUTANEOUS at 00:12

## 2021-05-11 RX ADMIN — HEPARIN SODIUM SCH UNIT: 5000 INJECTION INTRAVENOUS; SUBCUTANEOUS at 16:16

## 2021-05-11 RX ADMIN — HEPARIN SODIUM SCH UNIT: 5000 INJECTION INTRAVENOUS; SUBCUTANEOUS at 08:12

## 2021-05-11 RX ADMIN — ACETAMINOPHEN PRN MG: 325 TABLET, FILM COATED ORAL at 19:25

## 2021-05-11 RX ADMIN — PROPRANOLOL HYDROCHLORIDE SCH MG: 80 CAPSULE, EXTENDED RELEASE ORAL at 08:13

## 2021-05-11 RX ADMIN — LEVOTHYROXINE SODIUM SCH MCG: 50 TABLET ORAL at 05:38

## 2021-05-11 RX ADMIN — SODIUM CHLORIDE SCH MLS/HR: 9 INJECTION, SOLUTION INTRAVENOUS at 12:20

## 2021-05-11 NOTE — P.PN
Subjective


Progress Note Date: 05/11/21


Principal diagnosis: 





Status post I&D septic right knee infrapatellar bursitis





Patient was evaluated at bedside, he is resting comfortably.  He is having no 

acute pain with regards to the right knee.  He denies any fever or chills.





Objective





- Vital Signs


Vital signs: 


                                   Vital Signs











Temp  98.4 F   05/11/21 04:41


 


Pulse  71   05/11/21 04:41


 


Resp  16   05/11/21 04:41


 


BP  141/84   05/11/21 04:41


 


Pulse Ox  93 L  05/11/21 04:41








                                 Intake & Output











 05/10/21 05/11/21 05/11/21





 18:59 06:59 18:59


 


Intake Total 250 1500 


 


Balance 250 1500 


 


Intake:   


 


  Intake, IV Titration 250  





  Amount   


 


    Vancomycin 1,250 mg In 250  





    Sodium Chloride 0.9% 250   





    ml @ 125 mls/hr IVPB Q12H   





    [facility] Rx#:[id number]   


 


  Oral  1500 


 


Other:   


 


  Voiding Method  Toilet 


 


  # Voids 2 3 














- Exam





Right lower extremity:





Bandages were changed yesterday at bedside.  There is some dried drainage at the

proximal distal end of the bandage.  I was able to peel back and stressing, 

there is no active drainage currently.  The stitches are in good position and 

condition.  The erythema continues to improve.  Still significant areas of 

fluctuance appreciated on exam.  No significant tenderness reproduced around the

knee with palpation.  Calf is soft, no tenderness with palpation.  Distal 

neurovascular exam is intact.





- Labs


CBC & Chem 7: 


                                 05/11/21 06:30





                                 05/11/21 06:30


Labs: 


                  Abnormal Lab Results - Last 24 Hours (Table)











  05/11/21 05/11/21 Range/Units





  06:30 06:30 


 


RBC   3.88 L  (4.30-5.90)  m/uL


 


Hgb   11.9 L  (13.0-17.5)  gm/dL


 


Hct   34.0 L  (39.0-53.0)  %


 


Neutrophils #   8.1 H  (1.3-7.7)  k/uL


 


Chloride  108 H   ()  mmol/L


 


BUN  22 H   (9-20)  mg/dL


 


Glucose  109 H   (74-99)  mg/dL








                      Microbiology - Last 24 Hours (Table)











 05/04/21 14:10 Blood Culture - Final





 Blood    No Growth after 144 hours


 


 05/04/21 14:31 Blood Culture - Final





 Blood    No Growth after 144 hours


 


 05/08/21 12:00 Anaerobic Culture - Preliminary





 Knee - Right 


 


 05/08/21 12:00 Anaerobic Culture - Preliminary





 Knee - Right 


 


 05/08/21 12:00 Gram Stain - Final





 Knee - Right Wound Culture - Final


 


 05/08/21 12:00 Gram Stain - Final





 Knee - Right Wound Culture - Final














Assessment and Plan


Assessment: 





Postoperative day #3 status post I&D septic right knee infrapatellar bursitis


Plan: 





An orthopedic standpoint patient remained stable for discharge





Plan for follow-up with Dr. Randhawa in 1 week for recheck





Wound care instructions were discussed with patient, keep incision covered and 

dry while showering.  Keep incisions covered with a small dressing





Ice and elevate for symptomatic relief





Pending internal medicine and infectious disease recommendations hopeful 

discharge today


Time with Patient: Less than 30

## 2021-05-11 NOTE — PN
PROGRESS NOTE



DATE OF SERVICE:

05/11/2021



REASON FOR FOLLOWUP:

Right knee infrapatellar bursitis.



INTERVAL HISTORY:

Patient is currently afebrile.  The patient is breathing comfortably.  Patient denies

having any chest pain, shortness of breath or cough.  No nausea, vomiting, abdominal

pain or pain to the right knee area.



PHYSICAL EXAMINATION:

Blood pressure 149/99 with a pulse of 72, temperature is 97.7. He is 93% on room air.

General description: The patient is a middle-aged male lying in bed in no distress.

Respiratory system: Unlabored breathing, clear to auscultation anteriorly.

Heart S1, S2.  Regular rate and rhythm.

Abdomen soft, no tenderness.

Right knee overall swelling and redness has decreased.



LABS:

Hemoglobin is 11.1, white count 9.7, BUN of 22, creatinine 1.23.  Culture has been

negative so far.



DIAGNOSTIC IMPRESSION AND PLAN:

Patient with right knee infrapatellar bursitis, status post bursectomy.  The culture

negative for any resistant gram-positive.  Antibiotic will be adjusted to cefazolin and

reevaluate the patient tomorrow.  Continue supportive care.





MMODL / IJN: [id number] / Job#: [id number]

## 2021-05-12 VITALS
DIASTOLIC BLOOD PRESSURE: 95 MMHG | SYSTOLIC BLOOD PRESSURE: 159 MMHG | HEART RATE: 65 BPM | RESPIRATION RATE: 17 BRPM | TEMPERATURE: 97.9 F

## 2021-05-12 LAB
BASOPHILS # BLD AUTO: 0 K/UL (ref 0–0.2)
BASOPHILS NFR BLD AUTO: 0 %
EOSINOPHIL # BLD AUTO: 0 K/UL (ref 0–0.7)
EOSINOPHIL NFR BLD AUTO: 0 %
ERYTHROCYTE [DISTWIDTH] IN BLOOD BY AUTOMATED COUNT: 3.92 M/UL (ref 4.3–5.9)
ERYTHROCYTE [DISTWIDTH] IN BLOOD: 13.5 % (ref 11.5–15.5)
HCT VFR BLD AUTO: 34.5 % (ref 39–53)
HGB BLD-MCNC: 11.7 GM/DL (ref 13–17.5)
LYMPHOCYTES # SPEC AUTO: 0.9 K/UL (ref 1–4.8)
LYMPHOCYTES NFR SPEC AUTO: 11 %
MCH RBC QN AUTO: 29.9 PG (ref 25–35)
MCHC RBC AUTO-ENTMCNC: 34 G/DL (ref 31–37)
MCV RBC AUTO: 88.1 FL (ref 80–100)
MONOCYTES # BLD AUTO: 0.4 K/UL (ref 0–1)
MONOCYTES NFR BLD AUTO: 5 %
NEUTROPHILS # BLD AUTO: 7 K/UL (ref 1.3–7.7)
NEUTROPHILS NFR BLD AUTO: 83 %
PLATELET # BLD AUTO: 265 K/UL (ref 150–450)
WBC # BLD AUTO: 8.5 K/UL (ref 3.8–10.6)

## 2021-05-12 RX ADMIN — HEPARIN SODIUM SCH UNIT: 5000 INJECTION INTRAVENOUS; SUBCUTANEOUS at 09:01

## 2021-05-12 RX ADMIN — PROPRANOLOL HYDROCHLORIDE SCH MG: 80 CAPSULE, EXTENDED RELEASE ORAL at 09:02

## 2021-05-12 RX ADMIN — HEPARIN SODIUM SCH: 5000 INJECTION INTRAVENOUS; SUBCUTANEOUS at 16:38

## 2021-05-12 RX ADMIN — GLYCOPYRROLATE SCH MG: 1 TABLET ORAL at 09:01

## 2021-05-12 RX ADMIN — MECLIZINE HYDROCHLORIDE SCH MG: 25 TABLET ORAL at 09:02

## 2021-05-12 RX ADMIN — LEVOTHYROXINE SODIUM SCH MCG: 50 TABLET ORAL at 05:11

## 2021-05-12 NOTE — P.PN
Subjective


Progress Note Date: 05/11/21


Principal diagnosis: 





Right lower extremity swelling/cellulitis








Patient is a 55-year-old male with a known history of diabetes type 2 

non-insulin-dependent, hypertension, hyperlipidemia, obstructive sleep apnea, 

GERD, anxiety/depression/bipolar/schizoaffective disorder presents to ER due to 

complaints of right leg swelling and redness.  Patient was seen at Dr. Adame's 

office and was referred to ER due to right lower extremity cellulitis and failed

antibiotic therapy with Bactrim.  Patient states that redness and pain started 

below his right knee about a week ago and was started on Bactrim as an 

outpatient.  Patient is having increasing leg swelling and redness extending to 

lower leg up to ankle.


Otherwise patient denied any complaints of fever or chills.  No nausea vomiting 

abdominal pain or diarrhea.  No injury.  No fall.  Patient is having increasing 

pain and tightness to the right lower leg.


Patient had x-ray of the right leg 4/28/2021 showed arthropathy.  Small amount 

of fluid in the suprapatellar bursa could be correlated with MRI as clinically 

warranted.


Laboratory data showed WBC 10.3, hemoglobin 12.6 and platelets 345


Sodium 140 potassium 4.2 chloride 106 BUN 16 and creatinine 1.26


COVID-19 PCR not detected and liver enzymes are not elevated





5/5/2021


Patient is currently lying in the bed comfortably.  Right lower extremity 

swelling and redness is slightly improved.  Patient is able to bear weight.  

Still having right infrapatellar region is indurated and tenderness with 

palpation.  Patient has been afebrile.  Continued on antibiotics of vancomycin. 

Blood cultures negative so far.


No complaints of chest pain or shortness of breath.  No nausea vomiting 

abdominal pain or diarrhea.


Right lower extremity duplex scan is negative for DVT.





5/6/2021


Patient is resting in the bed awake alert 1x3.  Patient is still having right 

lower extremity swelling and redness and also tenderness of the right infra 

patella region.  Otherwise patient is afebrile.  Still unable to bear weight 

completely.  Continued on antibiotics and home vancomycin.


ID and orthopedic surgery will be consulted and continue with pain management.


Denies any chest pain or shortness of breath.  Patient has been afebrile.  No 

nausea vomiting or abdominal pain or diarrhea.





5/7/2021


Patient is currently lying in the bed comfortably.  Still having right leg 

swelling and redness is improving.  Right infrapatellar tenderness is not 

improving.  Patient was seen by orthopedic surgery and is planning for I&D 

tomorrow.  Follow-up culture reports.  Continue with antibiotics in the form of 

vancomycin.  Patient is allergic to penicillin.  ID is on board.  Otherwise 

patient has been afebrile.  Nothing by mouth and repeat labs tomorrow.


No complaints of chest pain or shortness breath.  No nausea vomiting abdominal 

pain or diarrhea.  No headache or dizziness or lightheadedness.





5/8/2021


Patient is currently lying in the bed.  Awake alert oriented x3.  Patient 

underwent I&D and irrigation and debridement of right knee infrapatellar septic 

bursitis.  Follow-up culture reports.  Patient is being continued on antibiotics

in the form of vancomycin.  ID is on board.  Pain is controlled.  Right lower 

extremity redness is improving.


No complaints of chest pain or shortness of.  No fever no chills.  No headache 

or dizziness lightheadedness.


Laboratory showed creatinine 1.22





5/9/2021


Patient is currently lying in bed.  Able to ambulate to the bathroom.  Pain in 

the right patella area is improving.  Right leg redness is also improving.


Patient has been afebrile.  No nausea vomiting abdominal pain or diarrhea.  No 

chest pain or shortness of breath.


Patient is status post I&D and irrigation of right patella bursitis.  Follow-up 

culture reports.  Continued on antibiotics in the form of vancomycin.





Laboratory data showed WBC 8.0 hemoglobin 12.5


BUN 21 creatinine 1.24 and vancomycin trough level is 17.7





5/10/2021


Patient is currently resting in the bed comfortably.  Right knee pain is better.

 Status post I&D of bursitis.  Drainage was removed.  Patient has been afebrile.

 Continued on antibiotics in the form of vancomycin.  Follow-up culture reports.

 Orthopedic surgery and ID is on board.


Lab data reviewed.





5/11/2021


Patient is currently resting in the bed comfortably.  Able to flex his right 

knee better.  No fever no chills.  Culture showed no growth so far.  Antibiotics

changed to cefazolin.  ID is on board.  Anticipate discharge in next 24 hours 

with final ID recommendations.  Follow-up with Dr. Mckoy in 1 week.





Current medications reviewed.





Objective





- Vital Signs


Vital signs: 


                                   Vital Signs











Temp  98.3 F   05/11/21 19:51


 


Pulse  72   05/11/21 19:51


 


Resp  16   05/11/21 19:51


 


BP  177/110   05/11/21 19:51


 


Pulse Ox  98   05/11/21 19:51








                                 Intake & Output











 05/11/21 05/11/21 05/12/21





 06:59 18:59 06:59


 


Intake Total 1500 50 


 


Balance 1500 50 


 


Weight  97.522 kg 


 


Intake:   


 


  Intake, IV Titration  50 





  Amount   


 


    ceFAZolin 2 gm In Sodium  50 





    Chloride 0.9% 50 ml @ 100   





    mls/hr IVPB Q8HR [facility] Rx#   





    :[id number]   


 


  Oral 1500  


 


Other:   


 


  Voiding Method Toilet Toilet 


 


  # Voids 3  














- Exam





PHYSICAL EXAMINATION: 


Patient is lying in the bed comfortably, no acute distress, awake alert and 

oriented.. 


HEENT: Normocephalic. Neck is supple. Pupils reactive. Nostrils clear. Oral 

cavity is moist. Ears reveal no drainage. 


Neck reveals no JVD, carotid bruits, or thyromegaly. 


CHEST EXAMINATION: Trachea is central. Symmetrical expansion. Lung fields clear 

to auscultation and percussion. 


CARDIAC: Normal S1, S2 with no gallops. No murmurs 


ABDOMEN: Soft. Bowel sounds normal. No organomegaly. No abdominal bruits. 


Extremities:   No clubbing or cyanosis. Right lower extremity redness is 

improving.  Minimal right infrapatellar tenderness.


Neurologically awake, alert, oriented x3 with well-coordinated movements.  No 

focal deficits noted


Skin: No rash or skin lesions. 


Psychiatric: Coperative.  Nonsuicidal


Musculoskeletal: No joint swelling or deformity.  Normal range of motion.





- Labs


CBC & Chem 7: 


                                 05/11/21 06:30





                                 05/11/21 06:30


Labs: 


                  Abnormal Lab Results - Last 24 Hours (Table)











  05/11/21 05/11/21 Range/Units





  06:30 06:30 


 


RBC   3.88 L  (4.30-5.90)  m/uL


 


Hgb   11.9 L  (13.0-17.5)  gm/dL


 


Hct   34.0 L  (39.0-53.0)  %


 


Neutrophils #   8.1 H  (1.3-7.7)  k/uL


 


Chloride  108 H   ()  mmol/L


 


BUN  22 H   (9-20)  mg/dL


 


Glucose  109 H   (74-99)  mg/dL














Assessment and Plan


Assessment: 








Right lower extremity swelling/cellulitis failed outpatient therapy.  Ruled out 

DVT.Unlikely related to diabetes.


Right infrapatellar  septic bursitis.  Status post I&D and irrigation. Culture 

showed no growth.


Diabetes type 2 non-insulin-dependent


Obstructive sleep apnea on CPAP


Hypertension


Hyperlipidemia


GERD


Anxiety, Bipolar, Depression, Schizoaffective Disorder


DVT prophylaxis with heparin subcu





Plan:


s/p I&D of rt infra pattellar busrasa done on 4/8. 


Patient will be continued on antibiotics in the form of vancomycin. Culture 

showed no growth. Antibiotics changed to Cefazolin.  


Ultrasound duplex of the right lower extremity negative for DVT.


 ID and orthopedic surgery is following. 





Continue with home medications and follow-up closely. 


Further recommendations based on clinical course.

## 2021-05-12 NOTE — PN
PROGRESS NOTE



DATE OF SERVICE:

05/12/2021



REASON FOR FOLLOWUP:

Right knee infrapatellar bursitis.



INTERVAL HISTORY:

Patient is currently afebrile.  The patient is breathing comfortably.  Patient denies

having any chest pain, shortness of breath or cough.  No abdominal pain or any

worsening pain to the right knee area.



PHYSICAL EXAMINATION:

Blood pressure 131/79, pulse of 73, temperature 98.4.  He is 95% on room air.

General description is a middle-aged male lying in bed in no distress.

Respiratory system: Unlabored breathing, clear to auscultation anteriorly.

Heart S1, S2.  Regular rate and rhythm.  Abdomen soft, no tenderness.

The right knee swelling has been minimal _____ drainage.



LABS:

White count 8.5.  Local culture has been negative so far.



DIAGNOSTIC IMPRESSION AND PLAN:

Patient with right knee infected bursitis status post bursectomy.  The patient has

shown overall improvement currently on cefazolin. We will finish therapy with oral

Keflex.  Prescription sent to the pharmacy. Close outpatient followup.





MMODL / IJN: [id number] / Job#: [id number]

## 2021-05-12 NOTE — P.PN
Subjective


Progress Note Date: 05/10/21


Principal diagnosis: 





Right lower extremity swelling/cellulitis








Patient is a 55-year-old male with a known history of diabetes type 2 

non-insulin-dependent, hypertension, hyperlipidemia, obstructive sleep apnea, 

GERD, anxiety/depression/bipolar/schizoaffective disorder presents to ER due to 

complaints of right leg swelling and redness.  Patient was seen at Dr. Adame's 

office and was referred to ER due to right lower extremity cellulitis and failed

antibiotic therapy with Bactrim.  Patient states that redness and pain started 

below his right knee about a week ago and was started on Bactrim as an 

outpatient.  Patient is having increasing leg swelling and redness extending to 

lower leg up to ankle.


Otherwise patient denied any complaints of fever or chills.  No nausea vomiting 

abdominal pain or diarrhea.  No injury.  No fall.  Patient is having increasing 

pain and tightness to the right lower leg.


Patient had x-ray of the right leg 4/28/2021 showed arthropathy.  Small amount 

of fluid in the suprapatellar bursa could be correlated with MRI as clinically 

warranted.


Laboratory data showed WBC 10.3, hemoglobin 12.6 and platelets 345


Sodium 140 potassium 4.2 chloride 106 BUN 16 and creatinine 1.26


COVID-19 PCR not detected and liver enzymes are not elevated





5/5/2021


Patient is currently lying in the bed comfortably.  Right lower extremity 

swelling and redness is slightly improved.  Patient is able to bear weight.  

Still having right infrapatellar region is indurated and tenderness with 

palpation.  Patient has been afebrile.  Continued on antibiotics of vancomycin. 

Blood cultures negative so far.


No complaints of chest pain or shortness of breath.  No nausea vomiting 

abdominal pain or diarrhea.


Right lower extremity duplex scan is negative for DVT.





5/6/2021


Patient is resting in the bed awake alert 1x3.  Patient is still having right 

lower extremity swelling and redness and also tenderness of the right infra 

patella region.  Otherwise patient is afebrile.  Still unable to bear weight 

completely.  Continued on antibiotics and home vancomycin.


ID and orthopedic surgery will be consulted and continue with pain management.


Denies any chest pain or shortness of breath.  Patient has been afebrile.  No 

nausea vomiting or abdominal pain or diarrhea.





5/7/2021


Patient is currently lying in the bed comfortably.  Still having right leg 

swelling and redness is improving.  Right infrapatellar tenderness is not 

improving.  Patient was seen by orthopedic surgery and is planning for I&D 

tomorrow.  Follow-up culture reports.  Continue with antibiotics in the form of 

vancomycin.  Patient is allergic to penicillin.  ID is on board.  Otherwise 

patient has been afebrile.  Nothing by mouth and repeat labs tomorrow.


No complaints of chest pain or shortness breath.  No nausea vomiting abdominal 

pain or diarrhea.  No headache or dizziness or lightheadedness.





5/8/2021


Patient is currently lying in the bed.  Awake alert oriented x3.  Patient 

underwent I&D and irrigation and debridement of right knee infrapatellar septic 

bursitis.  Follow-up culture reports.  Patient is being continued on antibiotics

in the form of vancomycin.  ID is on board.  Pain is controlled.  Right lower 

extremity redness is improving.


No complaints of chest pain or shortness of.  No fever no chills.  No headache 

or dizziness lightheadedness.


Laboratory showed creatinine 1.22





5/9/2021


Patient is currently lying in bed.  Able to ambulate to the bathroom.  Pain in 

the right patella area is improving.  Right leg redness is also improving.


Patient has been afebrile.  No nausea vomiting abdominal pain or diarrhea.  No 

chest pain or shortness of breath.


Patient is status post I&D and irrigation of right patella bursitis.  Follow-up 

culture reports.  Continued on antibiotics in the form of vancomycin.





Laboratory data showed WBC 8.0 hemoglobin 12.5


BUN 21 creatinine 1.24 and vancomycin trough level is 17.7





5/10/2021


Patient is currently resting in the bed comfortably.  Right knee pain is better.

 Status post I&D of bursitis.  Drainage was removed.  Patient has been afebrile.

 Continued on antibiotics in the form of vancomycin.  Follow-up culture reports.

 Orthopedic surgery and ID is on board.


Lab data reviewed.








Current medications reviewed.





Objective





- Vital Signs


Vital signs: 


                                   Vital Signs











Temp  97.9 F   05/10/21 20:08


 


Pulse  61   05/10/21 20:08


 


Resp  16   05/10/21 20:08


 


BP  170/105   05/10/21 20:08


 


Pulse Ox  99   05/10/21 20:08








                                 Intake & Output











 05/10/21 05/10/21 05/11/21





 06:59 18:59 06:59


 


Intake Total 90139 250 


 


Output Total 0  


 


Balance 20000 250 


 


Intake:   


 


  Intake, IV Titration  250 





  Amount   


 


    Vancomycin 1,250 mg In  250 





    Sodium Chloride 0.9% 250   





    ml @ 125 mls/hr IVPB Q12H   





    [facility] Rx#:[id number]   


 


  Oral 20000  


 


Output:   


 


  Stool 0  


 


Other:   


 


  Voiding Method Toilet  


 


  # Voids 4 2 














- Exam





PHYSICAL EXAMINATION: 


Patient is lying in the bed comfortably, no acute distress, awake alert and 

oriented.. 


HEENT: Normocephalic. Neck is supple. Pupils reactive. Nostrils clear. Oral 

cavity is moist. Ears reveal no drainage. 


Neck reveals no JVD, carotid bruits, or thyromegaly. 


CHEST EXAMINATION: Trachea is central. Symmetrical expansion. Lung fields clear 

to auscultation and percussion. 


CARDIAC: Normal S1, S2 with no gallops. No murmurs 


ABDOMEN: Soft. Bowel sounds normal. No organomegaly. No abdominal bruits. 


Extremities:   No clubbing or cyanosis. Right lower extremity redness is 

improving.  Minimal right infrapatellar tenderness.


Neurologically awake, alert, oriented x3 with well-coordinated movements.  No 

focal deficits noted


Skin: No rash or skin lesions. 


Psychiatric: Coperative.  Nonsuicidal


Musculoskeletal: No joint swelling or deformity.  Normal range of motion.





- Labs


CBC & Chem 7: 


                                 05/11/21 06:30





                                 05/11/21 06:30


Labs: 


                      Microbiology - Last 24 Hours (Table)











 05/04/21 14:10 Blood Culture - Final





 Blood    No Growth after 144 hours


 


 05/04/21 14:31 Blood Culture - Final





 Blood    No Growth after 144 hours


 


 05/08/21 12:00 Anaerobic Culture - Preliminary





 Knee - Right 


 


 05/08/21 12:00 Anaerobic Culture - Preliminary





 Knee - Right 


 


 05/08/21 12:00 Gram Stain - Final





 Knee - Right Wound Culture - Final


 


 05/08/21 12:00 Gram Stain - Final





 Knee - Right Wound Culture - Final














Assessment and Plan


Assessment: 








Right lower extremity swelling/cellulitis failed outpatient therapy.  Ruled out 

DVT.Unlikely related to diabetes.


Right infrapatellar  septic bursitis.  Status post I&D and irrigation.  Follow-

up culture report.


Diabetes type 2 non-insulin-dependent


Obstructive sleep apnea on CPAP


Hypertension


Hyperlipidemia


GERD


Anxiety, Bipolar, Depression, Schizoaffective Disorder


DVT prophylaxis with heparin subcu





Plan:


Patient will be continued on IV hydration with normal saline and antibiotics in 

the form of vancomycin.  Follow-up blood culture reports.  Ultrasound duplex of 

the right lower extremity negative for DVT.


Patient is still having right infra patellar tenderness and redness and 

swelling.  ID and orthopedic surgery is following. I&D of rt infra pattellar 

busrasa done on 4/8. followup cultures.


Continue with home medications and follow-up closely. 


Further recommendations based on clinical course.

## 2021-06-01 NOTE — P.DS
Providers


Date of admission: 


05/06/21 07:27





Expected date of discharge: 05/12/21


Attending physician: 


Pilar Hayden





Consults: 





                                        





05/06/21 14:33


Consult Physician Urgent 


   Consulting Provider: Sanju Osorio


   Consult Reason/Comments: cellulitis/ failed outpatient


   Do you want consulting provider notified?: Yes





05/06/21 17:11


Consult Physician Urgent 


   Consulting Provider: Jeronimo Randhawa


   Consult Reason/Comments: Right knee patellar bursitis


   Do you want consulting provider notified?: Yes











Primary care physician: 


Deep Stubbs





Hospital Course: 








Discharge diagnosis


Right lower extremity swelling/cellulitis failed outpatient therapy.  Ruled out 

DVT.Unlikely related to diabetes. improved. 


Right infrapatellar  septic bursitis.  Status post I&D and irrigation. Culture 

showed no growth.


Diabetes type 2 non-insulin-dependent


Obstructive sleep apnea on CPAP


Hypertension


Hyperlipidemia


GERD


Anxiety, Bipolar, Depression, Schizoaffective Disorder


DVT prophylaxis with heparin subcu








Hospital course


Patient is a 55-year-old male with a known history of diabetes type 2 

non-insulin-dependent, hypertension, hyperlipidemia, obstructive sleep apnea, 

GERD, anxiety/depression/bipolar/schizoaffective disorder presents to ER due to 

complaints of right leg swelling and redness.  Patient was seen at Dr. Adame's 

office and was referred to ER due to right lower extremity cellulitis and failed

antibiotic therapy with Bactrim.  Patient states that redness and pain started 

below his right knee about a week ago and was started on Bactrim as an 

outpatient.  Patient is having increasing leg swelling and redness extending to 

lower leg up to ankle.


Otherwise patient denied any complaints of fever or chills.  No nausea vomiting 

abdominal pain or diarrhea.  No injury.  No fall.  Patient is having increasing 

pain and tightness to the right lower leg.


Patient had x-ray of the right leg 4/28/2021 showed arthropathy.  Small amount 

of fluid in the suprapatellar bursa could be correlated with MRI as clinically 

warranted.


Laboratory data showed WBC 10.3, hemoglobin 12.6 and platelets 345


Sodium 140 potassium 4.2 chloride 106 BUN 16 and creatinine 1.26


COVID-19 PCR not detected and liver enzymes are not elevated





5/5/2021


Patient is currently lying in the bed comfortably.  Right lower extremity 

swelling and redness is slightly improved.  Patient is able to bear weight.  

Still having right infrapatellar region is indurated and tenderness with 

palpation.  Patient has been afebrile.  Continued on antibiotics of vancomycin. 

Blood cultures negative so far.


No complaints of chest pain or shortness of breath.  No nausea vomiting 

abdominal pain or diarrhea.


Right lower extremity duplex scan is negative for DVT.





5/6/2021


Patient is resting in the bed awake alert 1x3.  Patient is still having right 

lower extremity swelling and redness and also tenderness of the right infra 

patella region.  Otherwise patient is afebrile.  Still unable to bear weight 

completely.  Continued on antibiotics and home vancomycin.


ID and orthopedic surgery will be consulted and continue with pain management.


Denies any chest pain or shortness of breath.  Patient has been afebrile.  No 

nausea vomiting or abdominal pain or diarrhea.





5/7/2021


Patient is currently lying in the bed comfortably.  Still having right leg 

swelling and redness is improving.  Right infrapatellar tenderness is not 

improving.  Patient was seen by orthopedic surgery and is planning for I&D 

tomorrow.  Follow-up culture reports.  Continue with antibiotics in the form of 

vancomycin.  Patient is allergic to penicillin.  ID is on board.  Otherwise 

patient has been afebrile.  Nothing by mouth and repeat labs tomorrow.


No complaints of chest pain or shortness breath.  No nausea vomiting abdominal 

pain or diarrhea.  No headache or dizziness or lightheadedness.





5/8/2021


Patient is currently lying in the bed.  Awake alert oriented x3.  Patient 

underwent I&D and irrigation and debridement of right knee infrapatellar septic 

bursitis.  Follow-up culture reports.  Patient is being continued on antibiotics

in the form of vancomycin.  ID is on board.  Pain is controlled.  Right lower 

extremity redness is improving.


No complaints of chest pain or shortness of.  No fever no chills.  No headache 

or dizziness lightheadedness.


Laboratory showed creatinine 1.22





5/9/2021


Patient is currently lying in bed.  Able to ambulate to the bathroom.  Pain in 

the right patella area is improving.  Right leg redness is also improving.


Patient has been afebrile.  No nausea vomiting abdominal pain or diarrhea.  No 

chest pain or shortness of breath.


Patient is status post I&D and irrigation of right patella bursitis.  Follow-up 

culture reports.  Continued on antibiotics in the form of vancomycin.





Laboratory data showed WBC 8.0 hemoglobin 12.5


BUN 21 creatinine 1.24 and vancomycin trough level is 17.7





5/10/2021


Patient is currently resting in the bed comfortably.  Right knee pain is better.

 Status post I&D of bursitis.  Drainage was removed.  Patient has been afebrile.

 Continued on antibiotics in the form of vancomycin.  Follow-up culture reports.

 Orthopedic surgery and ID is on board.


Lab data reviewed.





5/11/2021


Patient is currently resting in the bed comfortably.  Able to flex his right 

knee better.  No fever no chills.  Culture showed no growth so far.  Antibiotics

changed to cefazolin.  ID is on board.  Anticipate discharge in next 24 hours 

with final ID recommendations.  Follow-up with Dr. Mckoy in 1 week.





5/12/2021


Patient is currently lying in the bed comfortably.  Right lower extremity 

swelling and redness is almost resolved.  Able to flex right knee better.  

Ambulating without support to the bathroom.  Right knee I&D site is bandaged.  

Patient will be continued on oral antibiotic course and follow-up with 

orthopedic surgery in the clinic in 1 week.  Otherwise patient has been 

afebrile.  No complaints of chest pain or shortness of breath.  No other acute 

overnight issues.  Patient is being discharged home today.








PHYSICAL EXAMINATION: 


Patient is lying in the bed comfortably, no acute distress, awake alert and 

oriented.. 


HEENT: Normocephalic. Neck is supple. Pupils reactive. Nostrils clear. Oral 

cavity is moist. Ears reveal no drainage. 


Neck reveals no JVD, carotid bruits, or thyromegaly. 


CHEST EXAMINATION: Trachea is central. Symmetrical expansion. Lung fields clear 

to auscultation and percussion. 


CARDIAC: Normal S1, S2 with no gallops. No murmurs 


ABDOMEN: Soft. Bowel sounds normal. No organomegaly. No abdominal bruits. 


Extremities:   No clubbing or cyanosis. no redness.  Minimal right infrapatellar

tenderness.


Neurologically awake, alert, oriented x3 with well-coordinated movements.  No 

focal deficits noted


Skin: No rash or skin lesions. 


Psychiatric: Coperative.  Nonsuicidal


Musculoskeletal: No joint swelling or deformity.  Normal range of motion.





Discharge vitals reviewed.








Patient Condition at Discharge: Stable





Plan - Discharge Summary


New Discharge Prescriptions: 


New


   Cephalexin [Keflex] 500 mg PO Q6HR 10 Days #40 cap





Continue


   cloZAPine [Clozaril] 150 mg PO HS 30 Days #45 tab


   Rosuvastatin Calcium [Crestor] 5 mg PO HS 30 Days #30 tab


   Propranolol HCl [Inderal Xl] 80 mg PO DAILY 30 Days #30 cap


   Lithium Carbonate 1,200 mg PO HS 30 Days #120 cap


   Glycopyrrolate [Robinul Forte] 2 mg PO BID 30 Days #60 tab


   Levothyroxine Sodium [Synthroid] 50 mcg PO DAILY 30 Days #30 tab


   Linagliptin [Tradjenta] 5 mg PO DAILY 30 Days #30 tab


   lisinopriL [Zestril] 20 mg PO DAILY 30 Days #30 tab


   Cetirizine HCl [Zyrtec] 10 mg PO DAILY


   clomiPRAMINE [Anafranil] 100 mg PO BID


   lamoTRIgine [LaMICtal] 150 mg PO BID


   Meclizine [Antivert] 25 mg PO BID





Discontinued


   amLODIPine [Norvasc] 10 mg PO DAILY #14 tab


   Sulfamethoxazole/Trimethoprim [Bactrim -160 mg] 1 tab PO BID


Discharge Medication List





Glycopyrrolate [Robinul Forte] 2 mg PO BID 30 Days #60 tab 02/17/20 [Rx]


Levothyroxine Sodium [Synthroid] 50 mcg PO DAILY 30 Days #30 tab 02/17/20 [Rx]


Linagliptin [Tradjenta] 5 mg PO DAILY 30 Days #30 tab 02/17/20 [Rx]


Lithium Carbonate 1,200 mg PO HS 30 Days #120 cap 02/17/20 [Rx]


Propranolol HCl [Inderal Xl] 80 mg PO DAILY 30 Days #30 cap 02/17/20 [Rx]


Rosuvastatin Calcium [Crestor] 5 mg PO HS 30 Days #30 tab 02/17/20 [Rx]


cloZAPine [Clozaril] 150 mg PO HS 30 Days #45 tab 02/17/20 [Rx]


lisinopriL [Zestril] 20 mg PO DAILY 30 Days #30 tab 02/17/20 [Rx]


Cetirizine HCl [Zyrtec] 10 mg PO DAILY 05/04/21 [History]


Meclizine [Antivert] 25 mg PO BID 05/04/21 [History]


clomiPRAMINE [Anafranil] 100 mg PO BID 05/04/21 [History]


lamoTRIgine [LaMICtal] 150 mg PO BID 05/04/21 [History]


Cephalexin [Keflex] 500 mg PO Q6HR 10 Days #40 cap 05/12/21 [Rx]








Follow up Appointment(s)/Referral(s): 


Enoc Adame MD [Primary Care Provider] - 1-2 days


[facility], [NON-STAFF] - 1 Week


Sanju Osorio MD [STAFF PHYSICIAN] - 1 Week


Jeronimo Randhawa MD [STAFF PHYSICIAN] - 1 Week


Activity/Diet/Wound Care/Special Instructions: 


Orthopedic discharge instructions:


1.  Keep incisions clean, dry, and intact


2.  Do not remove the stitches


3.  Keep incision covered and dry while showering


4.  Recommend use of a over-the-counter knee compression sleeve


5.  [facility], please call office 

with any questions [phone]








Wound Care Orders: 


1. Cover incision with Emulsion (Vaseline) gauze 


2. Wrap in Gauze (Kerlix) 


3. Wrap in Ace Bandage. 


4. Change dressing every day. 


Discharge Disposition: HOME WITH HOME HEALTH SERVICES

## 2021-06-08 NOTE — P.PN
LS: 11/18/19  NA: Not scheduled     Progress Note - Text


SUBJECTIVE:  He began on the interview with the statement that he is feeling a 

"wee bit better" then quickly backtracked complaining of "extreme anxiety", 

depression and suicidal thoughts.  The suicidal thoughts are death wishes; he 

wished that he were dead so that he did not have to "deal with" his depression 

and anxiety.  He denied a specific plan or intent.  He is overtly agreeing to 

placement in a group home and wishes to speak with the  about 

group home options. 





OBJECTIVE:   He presented as a casually groomed 51-year-old  male with 

a short unkept beard. He maintained eye contact and attended to the interview.  

He had a sad facial expression.  He is alert and oriented to person, place and 

time.  He showed no abnormality of psychomotor activity.  He showed no abnormal 

involuntary movements.  His affect was depressed and not reactive.  He did not 

describe suicidal ideation.  He expressed depressive cognitions including 

hopelessness and helplessness.  He described continued compulsive behaviors 

including cleaning and checking but the behaviors do not interfere with his 

functioning on the unit.  He denied ideas of reference and did not expressed 

paranoid ideation.  His thinking was abstract and associations were coherent 

and logical.  He expressed frequent depressive themes.  He denied 

hallucinations and did not appear to be responding to internal stimuli.





We discussed AFC placement with his liaison.  She stated that he had been 

placed in an AFC home in the past and appeared to have done well.  She will 

discuss another referral with his outpatient treatment team.





ASSESSMENT:  His complaints of severe symptoms of depression and anxiety are 

not consistent with his clinical presentation.  He is not having active 

suicidal thoughts or ideation.  There is no evidence of psychosis.  He has been 

taking lithium carbonate 300 mg twice a day for 2 days.





PLAN: Continue lithium 3 mg by mouth twice a day and obtain the lithium level 

at steady state (in 1-2 days), continue other medications as prescribed 

including clozapine 100 mg at bedtime, Luvox 150 mg twice a day, Lamictal 100 

mg daily and 150 mg at bedtime, individual 3 mg IM monthly and lorazepam 1 mg 

by mouth every 8 hours when necessary for anxiety.  The  [facility] liaison to discuss 

AFC placement with his outpatient treatment team

## 2022-07-23 NOTE — P.HPIM
History of Present Illness


H&P Date: 05/04/21


Chief Complaint: Right leg swelling and pain.








Patient is a 55-year-old male with a known history of diabetes type 2 

non-insulin-dependent, hypertension, hyperlipidemia, obstructive sleep apnea, 

GERD, anxiety/depression/bipolar/schizoaffective disorder presents to ER due to 

complaints of right leg swelling and redness.  Patient was seen at Dr. Adame's 

office and was referred to ER due to right lower extremity cellulitis and failed

antibiotic therapy with Bactrim.  Patient states that redness and pain started 

below his right knee about a week ago and was started on Bactrim as an 

outpatient.  Patient is having increasing leg swelling and redness extending to 

lower leg up to ankle.


Otherwise patient denied any complaints of fever or chills.  No nausea vomiting 

abdominal pain or diarrhea.  No injury.  No fall.  Patient is having increasing 

pain and tightness to the right lower leg.


Patient had x-ray of the right leg 4/28/2021 showed arthropathy.  Small amount 

of fluid in the suprapatellar bursa could be correlated with MRI as clinically 

warranted.


Laboratory data showed WBC 10.3, hemoglobin 12.6 and platelets 345


Sodium 140 potassium 4.2 chloride 106 BUN 16 and creatinine 1.26


COVID-19 PCR not detected and liver enzymes are not elevated








Review of Systems





Constitutional: Patient denies any fever or chills .  No generalized weakness or

weight loss.  


Abdomen: Patient denied nausea vomiting and diarrhea and abdominal pain.


Cardiovascular: Patient denies any chest pain or short of breath no 

palpitations.


Respiratory: patient denied any cough or sputum production.  No shortness of 

breath


Neurologic: Patient denied any numbness or tingling headache.


Musculoskeletal: Patient denies any complaints of joint swelling or deformity.R

ight leg swelling and redness and pain


Skin: Negative


Psychiatric: Negative


Endocrine: No heat or cold intolerance.  No recent weight gain.


Genitourinary: No dysuria or hematuria.


All other 14 point ROS negative except the above





Past Medical History


Past Medical History: Diabetes Mellitus, GERD/Reflux, Hyperlipidemia, 

Hypertension, Sleep Apnea/CPAP/BIPAP


Additional Past Medical History / Comment(s): Family history of premature eleuterio

nary artery disease. OCD.


History of Any Multi-Drug Resistant Organisms: None Reported


Past Surgical History: Hernia Repair


Past Anesthesia/Blood Transfusion Reactions: No Reported Reaction


Past Psychological History: Anxiety, Bipolar, Depression, Schizoaffective 

Disorder


Smoking Status: Never smoker


Past Alcohol Use History: None Reported


Past Drug Use History: None Reported





- Past Family History


  ** Father


Family Medical History: Congestive Heart Failure (CHF), Coronary Artery Disease 

(CAD), Diabetes Mellitus





  ** Mother


Family Medical History: Cancer





Medications and Allergies


                                Home Medications











 Medication  Instructions  Recorded  Confirmed  Type


 


Glycopyrrolate [Robinul Forte] 2 mg PO BID 30 Days #60 tab 02/17/20 05/04/21 Rx


 


Levothyroxine Sodium [Synthroid] 50 mcg PO DAILY 30 Days #30 tab 02/17/20 05/04/21 Rx


 


Linagliptin [Tradjenta] 5 mg PO DAILY 30 Days #30 tab 02/17/20 05/04/21 Rx


 


Lithium Carbonate 1,200 mg PO HS 30 Days #120 cap 02/17/20 05/04/21 Rx


 


Propranolol HCl [Inderal Xl] 80 mg PO DAILY 30 Days #30 cap 02/17/20 05/04/21 Rx


 


Rosuvastatin Calcium [Crestor] 5 mg PO HS 30 Days #30 tab 02/17/20 05/04/21 Rx


 


amLODIPine [Norvasc] 10 mg PO DAILY #14 tab 02/17/20 05/04/21 Rx


 


cloZAPine [Clozaril] 150 mg PO HS 30 Days #45 tab 02/17/20 05/04/21 Rx


 


lisinopriL [Zestril] 20 mg PO DAILY 30 Days #30 tab 02/17/20 05/04/21 Rx


 


Cetirizine HCl [Zyrtec] 10 mg PO DAILY 05/04/21 05/04/21 History


 


Meclizine [Antivert] 25 mg PO BID 05/04/21 05/04/21 History


 


Sulfamethoxazole/Trimethoprim 1 tab PO BID 05/04/21 05/04/21 History





[Bactrim -160 mg]    


 


clomiPRAMINE [Anafranil] 100 mg PO BID 05/04/21 05/04/21 History


 


lamoTRIgine [LaMICtal] 150 mg PO BID 05/04/21 05/04/21 History








                                    Allergies











Allergy/AdvReac Type Severity Reaction Status Date / Time


 


Penicillins Allergy  Unknown Verified 05/04/21 14:53





   Childhood  


 


risperidone [From Risperdal] Allergy  Unknown Verified 05/04/21 14:53


 


divalproex sodium AdvReac  Anxiety, Verified 05/04/21 14:53





[From Depakote]   weight gain  














Physical Exam


Vitals: 


                                   Vital Signs











  Temp Pulse Resp BP Pulse Ox


 


 05/04/21 20:37  97.5 F L  77  18  118/78  97


 


 05/04/21 13:56  97.7 F  72  20  108/75  95








                                Intake and Output











 05/04/21 05/04/21 05/04/21





 06:59 14:59 22:59


 


Other:   


 


  Weight  97.522 kg 














PHYSICAL EXAMINATION: 


Patient is lying in the bed comfortably, no acute distress, awake alert and 

oriented.. 


HEENT: Normocephalic. Neck is supple. Pupils reactive. Nostrils clear. Oral 

cavity is moist. Ears reveal no drainage. 


Neck reveals no JVD, carotid bruits, or thyromegaly. 


CHEST EXAMINATION: Trachea is central. Symmetrical expansion. Lung fields clear 

to auscultation and percussion. 


CARDIAC: Normal S1, S2 with no gallops. No murmurs 


ABDOMEN: Soft. Bowel sounds normal. No organomegaly. No abdominal bruits. 


Extremities: Patient does have swelling of the right lower extremity and redness

extending below the knee and up to ankle.  Tenderness and warmth..  No clubbing 

or cyanosis


Neurologically awake, alert, oriented x3 with well-coordinated movements.  No 

focal deficits noted


Skin: No rash or skin lesions. 


Psychiatric: Coperative.  Nonsuicidal


Musculoskeletal: No joint swelling or deformity.  Normal range of motion.





Results


CBC & Chem 7: 


                                 05/04/21 14:10





                                 05/04/21 14:10


Labs: 


                  Abnormal Lab Results - Last 24 Hours (Table)











  05/04/21 05/04/21 Range/Units





  14:10 14:10 


 


RBC  4.06 L   (4.30-5.90)  m/uL


 


Hgb  12.6 L   (13.0-17.5)  gm/dL


 


Hct  35.5 L   (39.0-53.0)  %


 


Neutrophils #  8.5 H   (1.3-7.7)  k/uL


 


Creatinine   1.26 H  (0.66-1.25)  mg/dL


 


Glucose   103 H  (74-99)  mg/dL














Thrombosis Risk Factor Assmnt





- DVT/VTE Prophylaxis


DVT/VTE Prophylaxis: Pharmacologic Prophylaxis ordered





Assessment and Plan


Assessment: 








Right lower extremity swelling/cellulitis failed outpatient therapy.  Rule out 

DVT.


Diabetes type 2 non-insulin-dependent


Obstructive sleep apnea on CPAP


Hypertension


Hyperlipidemia


GERD


Anxiety, Bipolar, Depression, Schizoaffective Disorder


DVT prophylaxis with heparin subcu





Plan:


Patient will be continued on IV hydration with normal saline and antibiotics in 

the form of vancomycin.  Follow-up blood culture reports.  Ultrasound duplex of 

the right lower extremity to rule out DVT.


Continue with home medications and follow-up closely.  Repeat CBC and BMP 

tomorrow.  Further recommendations based on clinical course.


Time with Patient: Greater than 30 Female

## 2023-03-06 ENCOUNTER — HOSPITAL ENCOUNTER (INPATIENT)
Dept: HOSPITAL 47 - EC | Age: 58
LOS: 11 days | Discharge: NURSING, REHAB OR LONG-TERM CARE | DRG: 330 | End: 2023-03-17
Attending: STUDENT IN AN ORGANIZED HEALTH CARE EDUCATION/TRAINING PROGRAM | Admitting: STUDENT IN AN ORGANIZED HEALTH CARE EDUCATION/TRAINING PROGRAM
Payer: MEDICARE

## 2023-03-06 VITALS — BODY MASS INDEX: 30.8 KG/M2

## 2023-03-06 DIAGNOSIS — G47.30: ICD-10-CM

## 2023-03-06 DIAGNOSIS — K21.9: ICD-10-CM

## 2023-03-06 DIAGNOSIS — T43.595A: ICD-10-CM

## 2023-03-06 DIAGNOSIS — F41.9: ICD-10-CM

## 2023-03-06 DIAGNOSIS — F10.11: ICD-10-CM

## 2023-03-06 DIAGNOSIS — Z79.4: ICD-10-CM

## 2023-03-06 DIAGNOSIS — E87.6: ICD-10-CM

## 2023-03-06 DIAGNOSIS — Z88.0: ICD-10-CM

## 2023-03-06 DIAGNOSIS — Z79.899: ICD-10-CM

## 2023-03-06 DIAGNOSIS — Z79.84: ICD-10-CM

## 2023-03-06 DIAGNOSIS — Z83.3: ICD-10-CM

## 2023-03-06 DIAGNOSIS — K56.609: Primary | ICD-10-CM

## 2023-03-06 DIAGNOSIS — E87.20: ICD-10-CM

## 2023-03-06 DIAGNOSIS — Z82.49: ICD-10-CM

## 2023-03-06 DIAGNOSIS — N25.1: ICD-10-CM

## 2023-03-06 DIAGNOSIS — Z20.822: ICD-10-CM

## 2023-03-06 DIAGNOSIS — Z88.8: ICD-10-CM

## 2023-03-06 DIAGNOSIS — G89.18: ICD-10-CM

## 2023-03-06 DIAGNOSIS — F25.1: ICD-10-CM

## 2023-03-06 DIAGNOSIS — J98.11: ICD-10-CM

## 2023-03-06 DIAGNOSIS — I10: ICD-10-CM

## 2023-03-06 DIAGNOSIS — Z87.440: ICD-10-CM

## 2023-03-06 DIAGNOSIS — Z79.890: ICD-10-CM

## 2023-03-06 DIAGNOSIS — I25.10: ICD-10-CM

## 2023-03-06 DIAGNOSIS — E87.1: ICD-10-CM

## 2023-03-06 DIAGNOSIS — Z71.3: ICD-10-CM

## 2023-03-06 DIAGNOSIS — E78.5: ICD-10-CM

## 2023-03-06 DIAGNOSIS — D62: ICD-10-CM

## 2023-03-06 DIAGNOSIS — N17.9: ICD-10-CM

## 2023-03-06 DIAGNOSIS — E87.8: ICD-10-CM

## 2023-03-06 DIAGNOSIS — D50.9: ICD-10-CM

## 2023-03-06 DIAGNOSIS — E66.01: ICD-10-CM

## 2023-03-06 DIAGNOSIS — F42.9: ICD-10-CM

## 2023-03-06 LAB
ALBUMIN SERPL-MCNC: 3.8 G/DL (ref 3.5–5)
ALP SERPL-CCNC: 75 U/L (ref 38–126)
ALT SERPL-CCNC: 15 U/L (ref 4–49)
ANION GAP SERPL CALC-SCNC: 8 MMOL/L
APTT BLD: 22.7 SEC (ref 22–30)
AST SERPL-CCNC: 21 U/L (ref 17–59)
BASOPHILS # BLD AUTO: 0 K/UL (ref 0–0.2)
BASOPHILS NFR BLD AUTO: 0 %
BUN SERPL-SCNC: 59 MG/DL (ref 9–20)
CALCIUM SPEC-MCNC: 8.7 MG/DL (ref 8.4–10.2)
CHLORIDE SERPL-SCNC: 95 MMOL/L (ref 98–107)
CK SERPL-CCNC: 38 U/L (ref 55–170)
CO2 SERPL-SCNC: 29 MMOL/L (ref 22–30)
EOSINOPHIL # BLD AUTO: 0 K/UL (ref 0–0.7)
EOSINOPHIL NFR BLD AUTO: 0 %
ERYTHROCYTE [DISTWIDTH] IN BLOOD BY AUTOMATED COUNT: 3.71 M/UL (ref 4.3–5.9)
ERYTHROCYTE [DISTWIDTH] IN BLOOD BY AUTOMATED COUNT: 4.32 M/UL (ref 4.3–5.9)
ERYTHROCYTE [DISTWIDTH] IN BLOOD: 14.3 % (ref 11.5–15.5)
ERYTHROCYTE [DISTWIDTH] IN BLOOD: 14.4 % (ref 11.5–15.5)
GLUCOSE SERPL-MCNC: 252 MG/DL (ref 74–99)
HCT VFR BLD AUTO: 33.2 % (ref 39–53)
HCT VFR BLD AUTO: 36.9 % (ref 39–53)
HGB BLD-MCNC: 11 GM/DL (ref 13–17.5)
HGB BLD-MCNC: 12.6 GM/DL (ref 13–17.5)
HYALINE CASTS UR QL AUTO: 26 /LPF (ref 0–2)
INR PPP: 1 (ref ?–1.2)
LYMPHOCYTES # SPEC AUTO: 0.9 K/UL (ref 1–4.8)
LYMPHOCYTES NFR SPEC AUTO: 5 %
MCH RBC QN AUTO: 29 PG (ref 25–35)
MCH RBC QN AUTO: 29.7 PG (ref 25–35)
MCHC RBC AUTO-ENTMCNC: 33.3 G/DL (ref 31–37)
MCHC RBC AUTO-ENTMCNC: 34.1 G/DL (ref 31–37)
MCV RBC AUTO: 85.2 FL (ref 80–100)
MCV RBC AUTO: 89.3 FL (ref 80–100)
MONOCYTES # BLD AUTO: 1 K/UL (ref 0–1)
MONOCYTES NFR BLD AUTO: 6 %
NEUTROPHILS # BLD AUTO: 16 K/UL (ref 1.3–7.7)
NEUTROPHILS NFR BLD AUTO: 88 %
PH UR: 6 [PH] (ref 5–8)
PLATELET # BLD AUTO: 285 K/UL (ref 150–450)
PLATELET # BLD AUTO: 371 K/UL (ref 150–450)
POTASSIUM SERPL-SCNC: 4.8 MMOL/L (ref 3.5–5.1)
PROT SERPL-MCNC: 6.3 G/DL (ref 6.3–8.2)
PROT UR QL: (no result)
PT BLD: 10.8 SEC (ref 9–12)
RBC UR QL: <1 /HPF (ref 0–5)
SODIUM SERPL-SCNC: 132 MMOL/L (ref 137–145)
SP GR UR: 1.02 (ref 1–1.03)
SQUAMOUS UR QL AUTO: <1 /HPF (ref 0–4)
TROPONIN I SERPL-MCNC: <0.012 NG/ML (ref 0–0.03)
UROBILINOGEN UR QL STRIP: 2 MG/DL (ref ?–2)
WBC # BLD AUTO: 13 K/UL (ref 3.8–10.6)
WBC # BLD AUTO: 18.1 K/UL (ref 3.8–10.6)
WBC # UR AUTO: 1 /HPF (ref 0–5)

## 2023-03-06 PROCEDURE — 86850 RBC ANTIBODY SCREEN: CPT

## 2023-03-06 PROCEDURE — 85610 PROTHROMBIN TIME: CPT

## 2023-03-06 PROCEDURE — 74019 RADEX ABDOMEN 2 VIEWS: CPT

## 2023-03-06 PROCEDURE — 83036 HEMOGLOBIN GLYCOSYLATED A1C: CPT

## 2023-03-06 PROCEDURE — 80048 BASIC METABOLIC PNL TOTAL CA: CPT

## 2023-03-06 PROCEDURE — 87635 SARS-COV-2 COVID-19 AMP PRB: CPT

## 2023-03-06 PROCEDURE — 84300 ASSAY OF URINE SODIUM: CPT

## 2023-03-06 PROCEDURE — 80178 ASSAY OF LITHIUM: CPT

## 2023-03-06 PROCEDURE — 85027 COMPLETE CBC AUTOMATED: CPT

## 2023-03-06 PROCEDURE — 74176 CT ABD & PELVIS W/O CONTRAST: CPT

## 2023-03-06 PROCEDURE — 71045 X-RAY EXAM CHEST 1 VIEW: CPT

## 2023-03-06 PROCEDURE — 36415 COLL VENOUS BLD VENIPUNCTURE: CPT

## 2023-03-06 PROCEDURE — 85730 THROMBOPLASTIN TIME PARTIAL: CPT

## 2023-03-06 PROCEDURE — 80159 DRUG ASSAY CLOZAPINE: CPT

## 2023-03-06 PROCEDURE — 86900 BLOOD TYPING SEROLOGIC ABO: CPT

## 2023-03-06 PROCEDURE — 80053 COMPREHEN METABOLIC PANEL: CPT

## 2023-03-06 PROCEDURE — 88307 TISSUE EXAM BY PATHOLOGIST: CPT

## 2023-03-06 PROCEDURE — 83605 ASSAY OF LACTIC ACID: CPT

## 2023-03-06 PROCEDURE — 93005 ELECTROCARDIOGRAM TRACING: CPT

## 2023-03-06 PROCEDURE — 74018 RADEX ABDOMEN 1 VIEW: CPT

## 2023-03-06 PROCEDURE — 71046 X-RAY EXAM CHEST 2 VIEWS: CPT

## 2023-03-06 PROCEDURE — 83935 ASSAY OF URINE OSMOLALITY: CPT

## 2023-03-06 PROCEDURE — 86901 BLOOD TYPING SEROLOGIC RH(D): CPT

## 2023-03-06 PROCEDURE — 94760 N-INVAS EAR/PLS OXIMETRY 1: CPT

## 2023-03-06 PROCEDURE — 84484 ASSAY OF TROPONIN QUANT: CPT

## 2023-03-06 PROCEDURE — 84295 ASSAY OF SERUM SODIUM: CPT

## 2023-03-06 PROCEDURE — 85025 COMPLETE CBC W/AUTO DIFF WBC: CPT

## 2023-03-06 PROCEDURE — 83735 ASSAY OF MAGNESIUM: CPT

## 2023-03-06 PROCEDURE — 82553 CREATINE MB FRACTION: CPT

## 2023-03-06 PROCEDURE — 82550 ASSAY OF CK (CPK): CPT

## 2023-03-06 PROCEDURE — 84443 ASSAY THYROID STIM HORMONE: CPT

## 2023-03-06 PROCEDURE — 99285 EMERGENCY DEPT VISIT HI MDM: CPT

## 2023-03-06 PROCEDURE — 81001 URINALYSIS AUTO W/SCOPE: CPT

## 2023-03-06 RX ADMIN — ATORVASTATIN CALCIUM SCH MG: 10 TABLET, FILM COATED ORAL at 22:58

## 2023-03-06 RX ADMIN — METRONIDAZOLE SCH MLS/HR: 500 INJECTION, SOLUTION INTRAVENOUS at 23:18

## 2023-03-06 RX ADMIN — PANTOPRAZOLE SODIUM SCH MG: 40 INJECTION, POWDER, FOR SOLUTION INTRAVENOUS at 22:55

## 2023-03-06 RX ADMIN — METRONIDAZOLE SCH: 500 INJECTION, SOLUTION INTRAVENOUS at 23:18

## 2023-03-06 RX ADMIN — LITHIUM CARBONATE SCH MG: 300 CAPSULE, GELATIN COATED ORAL at 22:56

## 2023-03-06 RX ADMIN — CEFEPIME HYDROCHLORIDE SCH MLS/HR: 2 INJECTION, POWDER, FOR SOLUTION INTRAVENOUS at 17:04

## 2023-03-06 NOTE — P.GSCN
History of Present Illness


Consult date: 03/06/23


History of present illness: 





CHIEF COMPLAINT: Abdominal pain and chest pain





HISTORY OF PRESENT ILLNESS: This is a 57-year-old male with psychiatric history.

 Past surgical history includes a left inguinal hernia repair.  He presents for 

hospital with complaints of abdominal pain and chest pain 2 weeks.  He reports 

that he's been having nausea and vomiting.  And it is been several days since 

she's had a bowel movement or flatus.  Abdomen is distended.  He denies any 

prior history of bowel obstruction.  Computed tomography scan of the abdomen 

shows dilated small bowel, cecum/ascending colon with transition point within 

the proximal third of the transverse colon with focal narrowing and non-

distention of the remainder of the colon.  Findings may represent stricture 

versus mass.  Patient denies any prior cardiac history.  





PAST MEDICAL HISTORY: 


See below.  Diabetes mellitus, hypertension, hyperlipidemia, anxiety, bipolar, 

depression, schizoaffective disorder, obstructive sleep apnea





PAST SURGICAL HISTORY: 


See below





MEDICATIONS: 


See below





ALLERGIES: 


See below





SOCIAL HISTORY: No illicit drug use.  





REVIEW OF SYSTEMS: 


CONSTITUTIONAL: Denies fever or chills.


HEENT: Denies blurred vision, vision changes, or eye pain. Denies hemoptysis 


CARDIOVASCULAR: Denies chest pain or pressure.


RESPIRATORY: No shortness of breath. 


GASTROINTESTINAL: See HPI for pertinent findings


HEMATOLOGIC: Denies bleeding disorders.


GENITOURINARY:  Denies any blood in urine or increased urinary frequency.  


SKIN: Denies pruitis. Denies rash.





PHYSICAL EXAM: 


VITAL SIGNS: Reviewed


GENERAL: Well-developed in no acute distress. 


HEENT:  No sclera icterus. Extraocular movements grossly intact.  Moist buccal 

mucosa. Head is atraumatic, normocephalic. No nasal drainage.


ABDOMEN: Distended.  Diffuse tenderness


NEUROLOGIC:  Alert and oriented.  Cranial nerves II through XII grossly intact.





LABORATORY DATA:


WBC is 18.1 hgb 12.6 platelets 371


Sodium 132 potassium is 4.8 creatinine 2.11


Lactic acid 1.2


Troponin negative





IMAGING:


Computed tomography scan abdomen and pelvis dilated small bowel, cecum/ascending

colon with transition point within the proximal third of the transverse colon 

with focal narrowing and nondistention of the remainder of the colon.  Findings 

may represent stricture versus mass.  Direct visualization recommended to rule 

out underlying malignancy.  Small bowel dilatation and small bowel feces 

throughout suggesting delayed transit/fecal stasis.  Dilated stomach and 

esophagus filled with ingested contents likely secondary to #1.





ASSESSMENT: 


1.  Small bowel obstruction with transition point in the proximal third of the 

transverse colon.  Findings may represent stricture versus mass.


2.  History of left inguinal hernia surgical repair


3.  Leukocytosis


5.  Acute kidney injury





PLAN: 


-Patient is scheduled for exploratory laparotomy with colectomy tomorrow with 

Dr. Mcgowan


-Keep patient nothing by mouth


-NG tube placed for decompression


-Recommend IV antibiotics


-Continue IV fluids





Physician Assistant note has been reviewed by physician. Signing provider agrees

with the documented findings, assessment, and plan of care. 





Past Medical History


Past Medical History: Diabetes Mellitus, GERD/Reflux, Hyperlipidemia, 

Hypertension, Sleep Apnea/CPAP/BIPAP


Additional Past Medical History / Comment(s): Family history of premature 

coronary artery disease. OCD.


History of Any Multi-Drug Resistant Organisms: None Reported


Past Surgical History: Hernia Repair


Past Anesthesia/Blood Transfusion Reactions: No Reported Reaction


Past Psychological History: Anxiety, Bipolar, Depression, Schizoaffective 

Disorder


Smoking Status: Never smoker


Past Alcohol Use History: None Reported


Past Drug Use History: None Reported





- Past Family History


  ** Father


Family Medical History: Congestive Heart Failure (CHF), Coronary Artery Disease 

(CAD), Diabetes Mellitus





  ** Mother


Family Medical History: Cancer





Medications and Allergies


                                Home Medications











 Medication  Instructions  Recorded  Confirmed  Type


 


Glycopyrrolate [Robinul Forte] 2 mg PO BID 30 Days #60 tab 02/17/20 03/06/23 Rx


 


Levothyroxine Sodium [Synthroid] 50 mcg PO DAILY 30 Days #30 tab 02/17/20 03/06/23 Rx


 


Lithium Carbonate 1,200 mg PO HS 30 Days #120 cap 02/17/20 03/06/23 Rx


 


Rosuvastatin Calcium [Crestor] 5 mg PO HS 30 Days #30 tab 02/17/20 03/06/23 Rx


 


cloZAPine [Clozaril] 150 mg PO HS 30 Days #45 tab 02/17/20 03/06/23 Rx


 


Cetirizine HCl [Zyrtec] 10 mg PO DAILY 05/04/21 03/06/23 History


 


Meclizine [Antivert] 25 mg PO BID 05/04/21 03/06/23 History


 


clomiPRAMINE [Anafranil] 100 mg PO BID 05/04/21 03/06/23 History


 


lamoTRIgine [LaMICtal] 150 mg PO BID 05/04/21 03/06/23 History


 


Linagliptin [Tradjenta] 5 mg PO DAILY 03/06/23 03/06/23 History


 


Omeprazole [PriLOSEC] 40 mg PO DAILY 03/06/23 03/06/23 History


 


Propranolol HCl [Propranolol HCl 80 mg PO DAILY 03/06/23 03/06/23 History





ER]    


 


amLODIPine [Norvasc] 10 mg PO DAILY 03/06/23 03/06/23 History








                                    Allergies











Allergy/AdvReac Type Severity Reaction Status Date / Time


 


Penicillins Allergy  Unknown Verified 03/06/23 12:13





   Childhood  


 


risperidone [From Risperdal] Allergy  Unknown Verified 03/06/23 12:13


 


divalproex sodium AdvReac  Anxiety, Verified 03/06/23 12:13





[From Depakote]   weight gain  














Surgical - Exam


                                   Vital Signs











Temp Pulse Resp BP Pulse Ox


 


 98.3 F   103 H  18   118/86   98 


 


 03/06/23 09:46  03/06/23 09:46  03/06/23 09:46  03/06/23 09:46  03/06/23 09:46














Results





- Labs





                                 03/06/23 10:04





                                 03/06/23 10:04


                  Abnormal Lab Results - Last 24 Hours (Table)











  03/06/23 03/06/23 03/06/23 Range/Units





  10:04 10:04 10:04 


 


WBC  18.1 H    (3.8-10.6)  k/uL


 


Hgb  12.6 L    (13.0-17.5)  gm/dL


 


Hct  36.9 L    (39.0-53.0)  %


 


Neutrophils #  16.0 H    (1.3-7.7)  k/uL


 


Lymphocytes #  0.9 L    (1.0-4.8)  k/uL


 


Sodium   132 L   (137-145)  mmol/L


 


Chloride   95 L   ()  mmol/L


 


BUN   59 H   (9-20)  mg/dL


 


Creatinine   2.11 H   (0.66-1.25)  mg/dL


 


Glucose   252 H   (74-99)  mg/dL


 


Total Creatine Kinase    38 L  ()  U/L








                                 Diabetes panel











  03/06/23 Range/Units





  10:04 


 


Sodium  132 L  (137-145)  mmol/L


 


Potassium  4.8  (3.5-5.1)  mmol/L


 


Chloride  95 L  ()  mmol/L


 


Carbon Dioxide  29  (22-30)  mmol/L


 


BUN  59 H  (9-20)  mg/dL


 


Creatinine  2.11 H  (0.66-1.25)  mg/dL


 


Glucose  252 H  (74-99)  mg/dL


 


Calcium  8.7  (8.4-10.2)  mg/dL


 


AST  21  (17-59)  U/L


 


ALT  15  (4-49)  U/L


 


Alkaline Phosphatase  75  ()  U/L


 


Total Protein  6.3  (6.3-8.2)  g/dL


 


Albumin  3.8  (3.5-5.0)  g/dL








                                  Calcium panel











  03/06/23 Range/Units





  10:04 


 


Calcium  8.7  (8.4-10.2)  mg/dL


 


Albumin  3.8  (3.5-5.0)  g/dL








                                 Pituitary panel











  03/06/23 Range/Units





  10:04 


 


Sodium  132 L  (137-145)  mmol/L


 


Potassium  4.8  (3.5-5.1)  mmol/L


 


Chloride  95 L  ()  mmol/L


 


Carbon Dioxide  29  (22-30)  mmol/L


 


BUN  59 H  (9-20)  mg/dL


 


Creatinine  2.11 H  (0.66-1.25)  mg/dL


 


Glucose  252 H  (74-99)  mg/dL


 


Calcium  8.7  (8.4-10.2)  mg/dL








                                  Adrenal panel











  03/06/23 Range/Units





  10:04 


 


Sodium  132 L  (137-145)  mmol/L


 


Potassium  4.8  (3.5-5.1)  mmol/L


 


Chloride  95 L  ()  mmol/L


 


Carbon Dioxide  29  (22-30)  mmol/L


 


BUN  59 H  (9-20)  mg/dL


 


Creatinine  2.11 H  (0.66-1.25)  mg/dL


 


Glucose  252 H  (74-99)  mg/dL


 


Calcium  8.7  (8.4-10.2)  mg/dL


 


Total Bilirubin  0.8  (0.2-1.3)  mg/dL


 


AST  21  (17-59)  U/L


 


ALT  15  (4-49)  U/L


 


Alkaline Phosphatase  75  ()  U/L


 


Total Protein  6.3  (6.3-8.2)  g/dL


 


Albumin  3.8  (3.5-5.0)  g/dL

## 2023-03-06 NOTE — XR
EXAMINATION TYPE: XR abdomen 2V

 

DATE OF EXAM: 3/6/2023

 

COMPARISON: 10/4/2014

 

HISTORY: Pain

 

TECHNIQUE: One view abdominal series

 

FINDINGS:  

The osseous structures are intact.  The bowel gas pattern is nonspecific. Dilated small bowel loops w
ith air-fluid levels. Lung bases clear. Postsurgical change right pelvis with bilateral hip arthropat
hy. Calcifications in the abdomen and pelvis noted.

 

IMPRESSION:

1. Findings are highly suspicious for bowel obstruction.

## 2023-03-06 NOTE — XR
EXAMINATION TYPE: XR chest 2V

 

DATE OF EXAM: 3/6/2023

 

COMPARISON: 10/4/2014

 

TECHNIQUE: PA and lateral views submitted.

 

HISTORY: Chest pain

 

FINDINGS:

Limited inspiration with bilateral consolidation. Heart is enlarged and there is no overt failure or 
pneumothorax. Hypertrophic changes of the spine. Dilated bowel loops in the abdomen.

 

IMPRESSION:

1. Right basilar atelectasis or infiltrate.

2. Correlate for bowel obstruction.

## 2023-03-06 NOTE — XR
EXAMINATION TYPE: XR KUB

 

DATE OF EXAM: 3/6/2023

 

COMPARISON: NONE

 

HISTORY: NG tube placement

 

TECHNIQUE: One view abdominal series

 

FINDINGS:  

Multiple dilated small bowel loops are seen. There appears to be a kink NG tube which extends to the 
upper left quadrant and then toward the right could represent placement within the distal gastric ant
rum or duodenal ball. Osseous structures intact. Calcifications are seen.

 

IMPRESSION:

1. NG tube appears to extend into the left abdomen and then takes a abrupt turn to the right. May be 
slight kinking of the tube. Tip could be within the distal gastric antrum or duodenal bulb.

2. Severely dilated bowel loops compatible with obstruction.

## 2023-03-06 NOTE — CT
EXAMINATION TYPE: CT abdomen pelvis wo con

CT DLP: 1068.4 mGycm, Automated exposure control for dose reduction was used.

 

DATE OF EXAM: 3/6/2023 11:23 AM

 

COMPARISON: None. 

 

CLINICAL INDICATION:Male, 57 years old with history of Abdominal pain; abdominal pain, distention

 

TECHNIQUE:  Axial CT of the abdomen and pelvis. Sagittal and coronal reformats were created on a [employer] workstation. 

 

Contrast used: None

Oral contrast used: without Oral Contrast 

 

FINDINGS: 

LOWER CHEST: Unremarkable

 

ABDOMEN

LIVER: Unremarkable

GALLBLADDER AND BILE DUCTS: Unremarkable.

PANCREAS: Unremarkable.

SPLEEN: Unremarkable.

ADRENAL GLANDS: Unremarkable.

KIDNEYS AND URETERS: No evidence of hydronephrosis or renal calculus. The ureters are unremarkable.  


 

PELVIS

BLADDER: Unremarkable

REPRODUCTIVE: Unremarkable.

 

ABDOMEN & PELVIS

STOMACH AND BOWEL: Distal esophagus is dilated with debris. The gastric lumen is dilated. There is di
ffuse small bowel feces with dilation of the small bowel measuring up to 4.0 cm. Transition point fel
t to be on series 201 image 61 at the large bowel transverse colon proximal one third. There is nondi
stention of the large bowel after this transition point. This extends to the rectum. The appendix is 
normal.

PERITONEUM/RETROPERITONEUM: No evidence of pneumoperitoneum or free fluid.

VASCULATURE: No evidence of aortic aneurysm. 

MUSCULOSKELETAL: No acute osseous abnormalities

LYMPH NODES: No gross evidence for lymphadenopathy.

SOFT TISSUE/ABDOMINAL WALL: Surgical changes to the right inguinal region. Small fat-containing ingui
nal hernia on the left. Small fat-containing umbilical hernia.

 

IMPRESSION:

1.  Dilated small bowel, cecum/ascending colon with transition point within the proximal third of the
 transverse colon with focal narrowing and nondistention of the remainder of the colon. Findings may 
represent stricture versus mass. Direct visualization recommended to rule out underlying malignancy.

2.  Small bowel dilatation is small bowel feces throughout suggesting delayed transit/fecal stasis.

3.  Dilated stomach and esophagus filled with ingested contents likely secondary to #1.

## 2023-03-06 NOTE — ED
General Adult HPI





- General


Chief complaint: Chest Pain


Stated complaint: CHEST/ABD PAIN


Time Seen by Provider: 03/06/23 10:38


Source: patient, RN notes reviewed, old records reviewed


Mode of arrival: wheelchair





- History of Present Illness


Initial comments: 





This a 57-year-old male presents emergency Department complaining of chest pain 

for the last 2 weeks.  Patient also complains of nausea and vomiting.  Patient 

also was not having a bowel movement for a while.  Patient denies any fever 

chills or cough per patient denies any palpitations.  Patient denies shortness 

of breath.  Patient denies any diaphoretic episodes.  Patient denies any 

diarrhea.  Patient states he used to be a heavy drinker but quit 8 years ago.  

Patient states his abdomen is considerably more distended than normal





- Related Data


                                Home Medications











 Medication  Instructions  Recorded  Confirmed


 


Cetirizine HCl [Zyrtec] 10 mg PO DAILY 05/04/21 03/06/23


 


Meclizine [Antivert] 25 mg PO BID 05/04/21 03/06/23


 


clomiPRAMINE [Anafranil] 100 mg PO BID 05/04/21 03/06/23


 


lamoTRIgine [LaMICtal] 150 mg PO BID 05/04/21 03/06/23


 


Linagliptin [Tradjenta] 5 mg PO DAILY 03/06/23 03/06/23


 


Omeprazole [PriLOSEC] 40 mg PO DAILY 03/06/23 03/06/23


 


Propranolol HCl [Propranolol HCl 80 mg PO DAILY 03/06/23 03/06/23





ER]   


 


amLODIPine [Norvasc] 10 mg PO DAILY 03/06/23 03/06/23








                                  Previous Rx's











 Medication  Instructions  Recorded


 


Glycopyrrolate [Robinul Forte] 2 mg PO BID 30 Days #60 tab 02/17/20


 


Levothyroxine Sodium [Synthroid] 50 mcg PO DAILY 30 Days #30 tab 02/17/20


 


Lithium Carbonate 1,200 mg PO HS 30 Days #120 cap 02/17/20


 


Rosuvastatin Calcium [Crestor] 5 mg PO HS 30 Days #30 tab 02/17/20


 


cloZAPine [Clozaril] 150 mg PO HS 30 Days #45 tab 02/17/20











                                    Allergies











Allergy/AdvReac Type Severity Reaction Status Date / Time


 


Penicillins Allergy  Unknown Verified 03/06/23 12:13





   Childhood  


 


risperidone [From Risperdal] Allergy  Unknown Verified 03/06/23 12:13


 


divalproex sodium AdvReac  Anxiety, Verified 03/06/23 12:13





[From Depakote]   weight gain  














Review of Systems


ROS Statement: 


Those systems with pertinent positive or pertinent negative responses have been 

documented in the HPI.





ROS Other: All systems not noted in ROS Statement are negative.





Past Medical History


Past Medical History: Diabetes Mellitus, GERD/Reflux, Hyperlipidemia, 

Hypertension, Sleep Apnea/CPAP/BIPAP


Additional Past Medical History / Comment(s): Family history of premature 

coronary artery disease. OCD.


History of Any Multi-Drug Resistant Organisms: None Reported


Past Surgical History: Hernia Repair


Past Anesthesia/Blood Transfusion Reactions: No Reported Reaction


Past Psychological History: Anxiety, Bipolar, Depression, Schizoaffective 

Disorder


Smoking Status: Never smoker


Past Alcohol Use History: None Reported


Past Drug Use History: None Reported





- Past Family History


  ** Father


Family Medical History: Congestive Heart Failure (CHF), Coronary Artery Disease 

(CAD), Diabetes Mellitus





  ** Mother


Family Medical History: Cancer





General Exam





- General Exam Comments


Initial Comments: 





GENERAL:


Patient is well-developed and well-nourished.  Patient is nontoxic and well-

hydrated and is in mild distress.





ENT:


Neck is soft and supple.  No significant lymphadenopathy is noted.  Oropharynx 

is clear.  Moist mucous membranes.  Neck has full range of motion without 

eliciting any pain.  





EYES:


The sclera were anicteric and conjunctiva were pink and moist.  Extraocular 

movements were intact and pupils were equal round and reactive to light.  

Eyelids were unremarkable.





PULMONARY:


Unlabored respirations.  Good breath sounds bilaterally.  No audible rales 

rhonchi or wheezing was noted.





CARDIOVASCULAR:


There is a regular rate and rhythm without any murmurs gallops or rubs.  





ABDOMEN:


Patient's abdomen is distended and has decreased breath sounds





SKIN:


Skin is clear with no lesions or rashes and otherwise unremarkable.





NEUROLOGIC:


Patient is alert and oriented x3.  Cranial nerves II through XII are grossly 

intact.  Motor and sensory are also intact.  Normal speech, volume and content. 

Symmetrical smile.  





MUSCULOSKELETAL:


Normal extremities with adequate strength and full range of motion.  No lower 

extremity swelling or edema.  No calf tenderness.





LYMPHATICS:


No significant lymphadenopathy is noted





PSYCHIATRIC:


Normal psychiatric evaluation.  





Course


                                   Vital Signs











  03/06/23





  09:46


 


Temperature 98.3 F


 


Pulse Rate 103 H


 


Respiratory 18





Rate 


 


Blood Pressure 118/86


 


O2 Sat by Pulse 98





Oximetry 














Medical Decision Making





- Medical Decision Making





EKG as interpreted by myself and shows a sinus tachycardia at 102 bpm MD inter

devendra is 160 QRS is 98 QT interval 340 QTC is 399.  Patient's EKG shows no ST 

segment elevation or depression.





Was pt. sent in by a medical professional or institution ([facility] Ferguson, NP, urgent 

care, hospital, or nursing home...) When possible be specific


@  -No


Did you speak to anyone other than the patient for history (EMS, parent, family,

police, friend...)? What history was obtained from this source 


@  -No


Did you review nursing and triage notes (agree or disagree)?  Why? 


@  -I reviewed and agree with nursing and triage notes


Were old charts reviewed (outside hosp., previous admission, EMS record, old 

EKG, old radiological studies, urgent care reports/EKG's, nursing home records)?

Report findings 


@  -No old charts were reviewed


Differential Diagnosis (chest pain, altered mental status, abdominal pain women,

abdominal pain men, vaginal bleeding, weakness, fever, dyspnea, syncope, 

headache, dizziness, GI bleed, back pain, seizure, CVA, palpatations, mental 

health, musculoskeletal)? 


@  -Differential Abdominal Pain Men:


Appendicitis, cholecystitis, diverticulosis, ischemic bowel, pancreatitis, 

hepatitis, UTI, gastroenteritis, AAA, incarcerated hernia, bowel obstruction, 

constipation, inflammatory bowel, hepatitis, peptic ulcer disease, splenic 

infarction, perforated viscus, testicular torsion, this is not meant to be an 

all-inclusive list





EKG interpreted by me (3pts min.).


@  -As above


X-rays interpreted by me (1pt min.).


@  -None done


CT interpreted by me (1pt min.).


@  -CT of the abdomen was interpreted by myself.  CT showed small bowel


U/S interpreted by me (1pt. min.).


@  -None done


What testing was considered but not performed or refused? (CT, X-rays, U/S, 

labs)? Why?


@  -None


What meds were considered but not given or refused? Why?


@  -None


Did you discuss the management of the patient with other professionals 

(professionals i.e. [facility] Ferguson, NP, lab, RT, psych nurse, , , 

teacher, , )? Give summary


@  -I spoke with some physicians agreed to admit the patient admitted the jose

ent wrote admitting orders.


Was smoking cessation discussed for >3mins.?


@  -No


Was critical care preformed (if so, how long)?


@  -No


Were there social determinants of health that impacted care today? How? (H

omelessness, low income, unemployed, alcoholism, drug addiction, transportation,

low edu. Level, literacy, decrease access to med. care, halfway, rehab)?


@  -No


Was there de-escalation of care discussed even if they declined (Discuss DNR or 

withdrawal of care, Hospice)? DNR status


@  -No


What co-morbidities impacted this encounter? (DM, HTN, Smoking, COPD, CAD, 

Cancer, CVA, ARF, Chemo, Hep., AIDS, mental health diagnosis, sleep apnea, 

morbid obesity)?


@  -None


Was patient admitted / discharged? Hospital course, mention meds given and 

route, prescriptions, significant lab abnormalities, going to OR and other 

pertinent info.


@  -Patient will be admitted to the hospital.  Patient has small bowel 

obstruction.  NG tube was placed.  Dr. Mcgowan was consulted.  I also spoke 

some physicians he agreed to admit the patient admitted the patient wrote 

admitting orders


Undiagnosed new problem with uncertain prognosis?


@  -No


Drug Therapy requiring intensive monitoring for toxicity (Heparin, Nitro, 

Insulin, Cardizem)?


@  -No


Were any procedures done?


@  -No


Diagnosis/symptom?


@  -Small bowel structures


Acute, or Chronic, or Acute on Chronic?


@  -Acute


Uncomplicated (without systemic symptoms) or Complicated (systemic symptoms)?


@  -Complicated


Side effects of treatment?


@  -No


Exacerbation, Progression, or Severe Exacerbation?


@  -No


Poses a threat to life or bodily function? How? (Chest pain, USA, MI, pneumonia,

PE, COPD, DKA, ARF, appy, cholecystitis, CVA, Diverticulitis, Homicidal, 

Suicidal, threat to staff... and all critical care pts)


@  -No


Diagnosis/symptom?


@  -Acute renal failure


Acute, or Chronic, or Acute on Chronic?


@  -Acute


Uncomplicated (without systemic symptoms) or Complicated (systemic symptoms)?


@  -Uncomplicated


Side effects of treatment?


@  -none


Exacerbation, Progression, or Severe Exacerbation]


@  -no


Poses a threat to life or bodily function?


@  -no





- Lab Data


Result diagrams: 


                                 03/06/23 10:04





                                 03/06/23 10:04


                                   Lab Results











  03/06/23 03/06/23 03/06/23 Range/Units





  10:04 10:04 10:04 


 


WBC  18.1 H    (3.8-10.6)  k/uL


 


RBC  4.32    (4.30-5.90)  m/uL


 


Hgb  12.6 L    (13.0-17.5)  gm/dL


 


Hct  36.9 L    (39.0-53.0)  %


 


MCV  85.2    (80.0-100.0)  fL


 


MCH  29.0    (25.0-35.0)  pg


 


MCHC  34.1    (31.0-37.0)  g/dL


 


RDW  14.4    (11.5-15.5)  %


 


Plt Count  371    (150-450)  k/uL


 


MPV  7.5    


 


Neutrophils %  88    %


 


Lymphocytes %  5    %


 


Monocytes %  6    %


 


Eosinophils %  0    %


 


Basophils %  0    %


 


Neutrophils #  16.0 H    (1.3-7.7)  k/uL


 


Lymphocytes #  0.9 L    (1.0-4.8)  k/uL


 


Monocytes #  1.0    (0-1.0)  k/uL


 


Eosinophils #  0.0    (0-0.7)  k/uL


 


Basophils #  0.0    (0-0.2)  k/uL


 


PT   10.8   (9.0-12.0)  sec


 


INR   1.0   (<1.2)  


 


APTT   22.7   (22.0-30.0)  sec


 


Sodium    132 L  (137-145)  mmol/L


 


Potassium    4.8  (3.5-5.1)  mmol/L


 


Chloride    95 L  ()  mmol/L


 


Carbon Dioxide    29  (22-30)  mmol/L


 


Anion Gap    8  mmol/L


 


BUN    59 H  (9-20)  mg/dL


 


Creatinine    2.11 H  (0.66-1.25)  mg/dL


 


Est GFR (CKD-EPI)AfAm    40  (>60 ml/min/1.73 sqM)  


 


Est GFR (CKD-EPI)NonAf    34  (>60 ml/min/1.73 sqM)  


 


Glucose    252 H  (74-99)  mg/dL


 


Plasma Lactic Acid Broderick     (0.7-2.0)  mmol/L


 


Calcium    8.7  (8.4-10.2)  mg/dL


 


Total Bilirubin    0.8  (0.2-1.3)  mg/dL


 


AST    21  (17-59)  U/L


 


ALT    15  (4-49)  U/L


 


Alkaline Phosphatase    75  ()  U/L


 


Total Creatine Kinase     ()  U/L


 


CK-MB (CK-2)     (0.0-2.4)  ng/mL


 


CK-MB (CK-2) Rel Index     


 


Troponin I     (0.000-0.034)  ng/mL


 


Total Protein    6.3  (6.3-8.2)  g/dL


 


Albumin    3.8  (3.5-5.0)  g/dL














  03/06/23 03/06/23 Range/Units





  10:04 10:04 


 


WBC    (3.8-10.6)  k/uL


 


RBC    (4.30-5.90)  m/uL


 


Hgb    (13.0-17.5)  gm/dL


 


Hct    (39.0-53.0)  %


 


MCV    (80.0-100.0)  fL


 


MCH    (25.0-35.0)  pg


 


MCHC    (31.0-37.0)  g/dL


 


RDW    (11.5-15.5)  %


 


Plt Count    (150-450)  k/uL


 


MPV    


 


Neutrophils %    %


 


Lymphocytes %    %


 


Monocytes %    %


 


Eosinophils %    %


 


Basophils %    %


 


Neutrophils #    (1.3-7.7)  k/uL


 


Lymphocytes #    (1.0-4.8)  k/uL


 


Monocytes #    (0-1.0)  k/uL


 


Eosinophils #    (0-0.7)  k/uL


 


Basophils #    (0-0.2)  k/uL


 


PT    (9.0-12.0)  sec


 


INR    (<1.2)  


 


APTT    (22.0-30.0)  sec


 


Sodium    (137-145)  mmol/L


 


Potassium    (3.5-5.1)  mmol/L


 


Chloride    ()  mmol/L


 


Carbon Dioxide    (22-30)  mmol/L


 


Anion Gap    mmol/L


 


BUN    (9-20)  mg/dL


 


Creatinine    (0.66-1.25)  mg/dL


 


Est GFR (CKD-EPI)AfAm    (>60 ml/min/1.73 sqM)  


 


Est GFR (CKD-EPI)NonAf    (>60 ml/min/1.73 sqM)  


 


Glucose    (74-99)  mg/dL


 


Plasma Lactic Acid Broderick  1.2   (0.7-2.0)  mmol/L


 


Calcium    (8.4-10.2)  mg/dL


 


Total Bilirubin    (0.2-1.3)  mg/dL


 


AST    (17-59)  U/L


 


ALT    (4-49)  U/L


 


Alkaline Phosphatase    ()  U/L


 


Total Creatine Kinase   38 L  ()  U/L


 


CK-MB (CK-2)   1.6  (0.0-2.4)  ng/mL


 


CK-MB (CK-2) Rel Index   4.2  


 


Troponin I   <0.012  (0.000-0.034)  ng/mL


 


Total Protein    (6.3-8.2)  g/dL


 


Albumin    (3.5-5.0)  g/dL














Disposition


Clinical Impression: 


 Bowel obstruction, Acute renal failure





Disposition: ADMITTED AS IP TO THIS HOSP


Referrals: 


None,Stated [Primary Care Provider] - 1-2 days


Time of Disposition: 12:29

## 2023-03-06 NOTE — XR
And abdomen.

 

DATE: 3/6/2023 at 3:53 PM.

 

COMPARISON: 3/6/2023 at 2:01 PM.

 

CLINICAL HISTORY: NG tube placement.

 

IMPRESSION:

 

There is an NG tube tip within the expected location of the gastroesophageal junction. Recommend adva
ncement.

 

Dilated loops of small bowel are seen in the upper abdomen and midabdomen along its visualized portio
ns compatible small bowel obstruction.

## 2023-03-06 NOTE — XR
EXAMINATION TYPE: XR chest 1V portable

 

DATE OF EXAM: 3/6/2023

 

COMPARISON: 6 2023 at 10:20 AM

 

HISTORY: Placement.

 

TECHNIQUE: Single frontal view of the chest is obtained.

 

IMPRESSION:

 

Lungs appear clear.

 

The cardiac silhouette is mildly enlarged and the pulmonary vessels are within normal limits.

 

There is to be an NG tube, however the tip is not clearly visualized, however does not appear to exte
nd into the abdomen and may be within the esophagus. Recommend repositioning and imaging.

## 2023-03-06 NOTE — XR
EXAMINATION TYPE: XR chest 1V portable

 

DATE OF EXAM: 3/6/2023

 

COMPARISON: NONE

 

HISTORY: NG placement

 

TECHNIQUE: Single frontal view of the chest is obtained.

 

FINDINGS:  NG tube seen extending into the abdomen likely near the gastroesophageal junction. Recomme
nd x-ray of the upper abdomen

 

There is bibasilar subsegmental consolidation. No pleural effusion, pneumothorax, or overt failure. H
eart size normal. Arthropathy of the right shoulder.

 

IMPRESSION:  

1. The NG tube can be seen to the region of the gastroesophageal junction.

2. Bilateral atelectasis favored over pneumonia.

## 2023-03-07 LAB
ANION GAP SERPL CALC-SCNC: 4 MMOL/L
BASOPHILS # BLD AUTO: 0 K/UL (ref 0–0.2)
BASOPHILS NFR BLD AUTO: 0 %
BUN SERPL-SCNC: 42 MG/DL (ref 9–20)
CALCIUM SPEC-MCNC: 8 MG/DL (ref 8.4–10.2)
CHLORIDE SERPL-SCNC: 105 MMOL/L (ref 98–107)
CO2 SERPL-SCNC: 28 MMOL/L (ref 22–30)
EOSINOPHIL # BLD AUTO: 0 K/UL (ref 0–0.7)
EOSINOPHIL NFR BLD AUTO: 0 %
ERYTHROCYTE [DISTWIDTH] IN BLOOD BY AUTOMATED COUNT: 3.43 M/UL (ref 4.3–5.9)
ERYTHROCYTE [DISTWIDTH] IN BLOOD BY AUTOMATED COUNT: 3.47 X 10*6/UL (ref 4.4–5.6)
ERYTHROCYTE [DISTWIDTH] IN BLOOD: 14.1 % (ref 11.5–14.5)
ERYTHROCYTE [DISTWIDTH] IN BLOOD: 14.4 % (ref 11.5–15.5)
GLUCOSE BLD-MCNC: 132 MG/DL (ref 70–110)
GLUCOSE BLD-MCNC: 135 MG/DL (ref 70–110)
GLUCOSE BLD-MCNC: 154 MG/DL (ref 70–110)
GLUCOSE BLD-MCNC: 156 MG/DL (ref 70–110)
GLUCOSE SERPL-MCNC: 140 MG/DL (ref 74–99)
HCT VFR BLD AUTO: 30.4 % (ref 39–53)
HCT VFR BLD AUTO: 31.4 % (ref 39.6–50)
HGB BLD-MCNC: 10 G/DL (ref 13–17)
HGB BLD-MCNC: 10.2 GM/DL (ref 13–17.5)
LYMPHOCYTES # SPEC AUTO: 0.7 K/UL (ref 1–4.8)
LYMPHOCYTES NFR SPEC AUTO: 10 %
MAGNESIUM SPEC-SCNC: 2.4 MG/DL (ref 1.6–2.3)
MCH RBC QN AUTO: 28.8 PG (ref 27–32)
MCH RBC QN AUTO: 29.6 PG (ref 25–35)
MCHC RBC AUTO-ENTMCNC: 31.8 G/DL (ref 32–37)
MCHC RBC AUTO-ENTMCNC: 33.5 G/DL (ref 31–37)
MCV RBC AUTO: 88.5 FL (ref 80–100)
MCV RBC AUTO: 90.5 FL (ref 80–97)
MONOCYTES # BLD AUTO: 0.7 K/UL (ref 0–1)
MONOCYTES NFR BLD AUTO: 9 %
NEUTROPHILS # BLD AUTO: 6 K/UL (ref 1.3–7.7)
NEUTROPHILS NFR BLD AUTO: 79 %
NRBC BLD AUTO-RTO: 0 /100 WBCS (ref 0–0)
PLATELET # BLD AUTO: 234 K/UL (ref 150–450)
PLATELET # BLD AUTO: 269 X 10*3/UL (ref 140–440)
POTASSIUM SERPL-SCNC: 3.8 MMOL/L (ref 3.5–5.1)
SODIUM SERPL-SCNC: 137 MMOL/L (ref 137–145)
WBC # BLD AUTO: 11.05 X 10*3/UL (ref 4.5–10)
WBC # BLD AUTO: 7.6 K/UL (ref 3.8–10.6)

## 2023-03-07 PROCEDURE — 0DBU0ZZ EXCISION OF OMENTUM, OPEN APPROACH: ICD-10-PCS

## 2023-03-07 PROCEDURE — 0DTF0ZZ RESECTION OF RIGHT LARGE INTESTINE, OPEN APPROACH: ICD-10-PCS

## 2023-03-07 RX ADMIN — ATORVASTATIN CALCIUM SCH MG: 10 TABLET, FILM COATED ORAL at 20:38

## 2023-03-07 RX ADMIN — POTASSIUM CHLORIDE SCH: 14.9 INJECTION, SOLUTION INTRAVENOUS at 15:12

## 2023-03-07 RX ADMIN — CEFEPIME HYDROCHLORIDE SCH MLS/HR: 2 INJECTION, POWDER, FOR SOLUTION INTRAVENOUS at 09:58

## 2023-03-07 RX ADMIN — METRONIDAZOLE SCH MLS/HR: 500 INJECTION, SOLUTION INTRAVENOUS at 09:58

## 2023-03-07 RX ADMIN — METRONIDAZOLE SCH MLS/HR: 500 INJECTION, SOLUTION INTRAVENOUS at 23:33

## 2023-03-07 RX ADMIN — LEVOTHYROXINE SODIUM SCH: 50 TABLET ORAL at 10:04

## 2023-03-07 RX ADMIN — CEFEPIME HYDROCHLORIDE SCH MLS/HR: 2 INJECTION, POWDER, FOR SOLUTION INTRAVENOUS at 23:28

## 2023-03-07 RX ADMIN — LITHIUM CARBONATE SCH MG: 300 CAPSULE, GELATIN COATED ORAL at 20:38

## 2023-03-07 RX ADMIN — PANTOPRAZOLE SODIUM SCH: 40 INJECTION, POWDER, FOR SOLUTION INTRAVENOUS at 12:06

## 2023-03-07 RX ADMIN — CEFAZOLIN SCH MLS/HR: 330 INJECTION, POWDER, FOR SOLUTION INTRAMUSCULAR; INTRAVENOUS at 20:39

## 2023-03-07 RX ADMIN — POTASSIUM CHLORIDE SCH: 14.9 INJECTION, SOLUTION INTRAVENOUS at 19:25

## 2023-03-07 RX ADMIN — METRONIDAZOLE SCH MLS/HR: 500 INJECTION, SOLUTION INTRAVENOUS at 16:47

## 2023-03-07 RX ADMIN — PANTOPRAZOLE SODIUM SCH MG: 40 INJECTION, POWDER, FOR SOLUTION INTRAVENOUS at 20:36

## 2023-03-07 RX ADMIN — CEFEPIME HYDROCHLORIDE SCH MLS/HR: 2 INJECTION, POWDER, FOR SOLUTION INTRAVENOUS at 16:44

## 2023-03-07 RX ADMIN — HYDROMORPHONE HYDROCHLORIDE PRN MG: 1 INJECTION, SOLUTION INTRAMUSCULAR; INTRAVENOUS; SUBCUTANEOUS at 22:38

## 2023-03-07 RX ADMIN — CEFEPIME HYDROCHLORIDE SCH MLS/HR: 2 INJECTION, POWDER, FOR SOLUTION INTRAVENOUS at 01:25

## 2023-03-07 RX ADMIN — PROPRANOLOL HYDROCHLORIDE SCH: 80 CAPSULE, EXTENDED RELEASE ORAL at 12:07

## 2023-03-07 NOTE — P.OP
Date of Procedure: 03/07/23


Preoperative Diagnosis: 


Colonic obstruction


Postoperative Diagnosis: 


Possible stricture of transverse colon





Massively dilated proximal transverse right colon and small bowel


Procedure(s) Performed: 


Right colectomy





Partial omentectomy


Anesthesia: BLANCA


Surgeon: Elmo Mcgowan


Estimated Blood Loss (ml): 25


Pathology: other (Terminal ileum, transverse colon, right colon)


Condition: stable


Disposition: PACU


Description of Procedure: 


Patient's placed on the operative table in supine position.  He received general

endotracheal tube as he.  His abdomen was prepped and draped in usual sterile 

fashion.  A skin incision was made in the midline.  The nasal cautery the subcu 

tissue divided.  The fascia was divided midline.  The small bowel was 

visualized.  The small bowel was massively dilated.  Small bowel was followed 

towards the cecum and the cecum was also very dilated.  The colon was dilated to

the level of the proximal transverse colon.  And then there was collapsed colon 

beyond this.  This point decided perform a extended right colectomy.  The 

terminal ileum was transected with a GI stapler.  And then the collapsed 

transverse colon was transected with a GI stapler.  Using the Enseal device 

mesentery the bowel was divided.  The omentum was also taken with the Enseal 

device.  The specimen sent to pathology.  Next a side-to-side functional 

end-to-end staple anastomosis created using EFREN and TA stapler.  The 

side-to-side functional end-to-end staple S was created.  And then a 3-0 GI silk

suture was placed as a crotch stitch.  There appeared to be no obstruction of 

the anastomosis.  The abdomen was areas of bleeding seen.  The fascia is closed 

loop #1 PDS suture.  It was closed staples.  Patient top she will was sent to 

recovery room in stable condition.

## 2023-03-07 NOTE — P.PN
Subjective


Progress Note Date: 03/07/23





Patient is a very pleasant 57-year-old male with a past medical history of type 

II non-insulin-dependent diabetes mellitus, hypertension, hyperlipidemia, GERD, 

schizoaffective disorder, anxiety, bipolar, depression, and previous inguinal 

hernia with repair.  Presented to the emergency department with a chief 

complaint of abdominal/epigastric/chest pain, nausea, vomiting, abdominal di

stention and constipation 2 weeks.  Patient reports vomiting dark black coffee-

colored emesis and inability to have a bowel movement 2 weeks.  He reports pain

institute diffuse abdomen radiating up into his epigastric region and chest.  He

denies having any fevers, chills, palpitations, shortness of breath, cough or 

congestion, hemoptysis, melena, or hematochezia.  Patient reports decreased appe

tite and inability to hold down oral intake.  He does report decreased urinary 

output, but denies any retention or difficulties initiating a stream.  Reports 

concerns of dehydration.  Patient underwent full evaluation in the emergency 

department.  Labs completed and reviewed.  He was found to have leukocytosis 

with WBC count of 18.1 and normocytic anemia with hemoglobin of 12.6.  

Coagulation profile normal findings.  BMP revealing hyponatremia with sodium 132

and hypochloremia with chloride 95 and acute kidney injury with BUN 59, 

creatinine 2.11, and GFR of 34 as well as hyperglycemia with glucose of 252.  

Troponin less than 0.012.  EKG showing sinus tachycardia at 102 bpm with T-wave 

inversion in lateral leads I and aVL, T-wave inversion is new finding when 

compared to previous EKG completed 2/3/20.  Chest x-ray completed and report 

reviewed stating limited inspiration with bilateral consolidation and right 

basilar atelectasis or infiltrate.  CT abdomen and pelvis without contrast com

pleted and radiology report reviewed showing dilated small bowel, 

cecum/ascending colon with transition point within the proximal third of the 

transverse colon with focal narrowing in none distention of the remainder of the

colon, findings possibly representing stricture versus mass, small bowel dil

ation with small bowel feces throughout suggesting delayed transit/fecal stasis 

and dilated stomach and esophagus filled with ingested contents.  Discussed 

these  findings with the ED physicianin detail and patient was accepted for 

admission to general surgery unit with telemetry under our services with 

consultation to general surgery.  Patient taken for colectomy on 3/7/23.





Physical exam:





Vital signs reviewed and stable. 


General: Nontoxic, no distress and appears stated age.  


Derm: Skin warm and dry, normal coloration for ethnicity.


Head: Atraumatic, normocephalic and symmetric.  


Eyes: EOMs intact, no lid lag, and anicteric sclera


Mouth: no lip lesions, mucus membranes moist


Cardiovascular: regular rate and rhythm with normal S1S2, no murmur, positive 

posterior tibial pulses bilaterally, and cap refill < 2 seconds.  


Lungs: Respirations even, regular, and unlabored on room air. Lungs CTA 

bilaterally, no rhonchi, no rales, no wheezing, and no accessory muscle usage. 


Abdominal: Abdomen taught pal distended with diffuse tenderness upon pation, no 

guarding, no appreciable organomegaly


Ext: ROM intact. No gross muscle atrophy, no edema, no contractures


Neuro: Speech clear, face symmetrical and CN II-XII grossly intact with no noted

focal neuro deficits


Psych: Alert and oriented to person, place, time, and situation. Appropriate and

pleasant affect. 





Assessment and Plan of Care:








Small bowel obstruction


Abdominal pain with intractable nausea and vomiting, resolved


Acute kidney injury, secondary to dehydration.  Improved.


Dehydration secondary to intractable nausea and vomiting.  Resolved.


Leukocytosis, resolved.


Normocytic anemia, stable


Hyponatremia, resolved


Hypochloremia, resolved


- Labs completed and reviewed.  CBC revealing resolution of leukocytosis with 

WBCs decreasing from previous 18.1 down to 7.6 simply with IV fluid hydration.  

Hemoglobin stable at 10.2.  BMP revealing significant improvement in renal 

function with BUN of 42, creatinine 1.28, and GFR of 63 from previous BUN of 59,

creatinine 2.11, and GFR 34.


-Discussed with general surgery PA and surgeon. Pt underwent right colectomy.  

NG tube to remain in place pending evaluation surgical tomorrow morning.


-GI bleed ruled out, patient to continue with Protonix 40 mg IVP twice daily for

GI prophylaxis at this time.


-Started on IV fluid hydration with 0.9% normal saline at 100 mL's per hour.


-Urinalysis obtained and was negative for infection.  Bladder scan was negative 

for retention.  Acute kidney injury is believed to be secondary to dehydration 

and significantly improved after IV fluid hydration as stated above.  Order 

placed for repeat morning CBC to follow up on normocytic anemia as well as BMP 

to monitor renal function for full resolution of acute kidney injury.





EKG changes


-EKG showing sinus tachycardia at 102 bpm with T-wave inversion in lateral leads

I and aVL, T-wave inversion is new finding when compared to previous EKG comp

leted 2/3/20 upon personal review and interpretation of both.


-Heart score 3. with risk of MACE 0.9-1.7%.  Troponins were trended overnight 

all resulting at less than 0.0123 draws.  Patient asymptomatic at this time.  

Therefore recommending no need for further cardiac testing or evaluation at this

time.





Diabetes mellitus with hyperglycemia


-Continue glycemic protocol with NovoLog sliding scale every 4 hours while 

patient remains nothing by mouth





Schizoaffective disorder


Anxiety and depression


Bipolar disorder


-Resume patient's home medication regimen with Lamictal, Clozaril and lithium. 


-Hold Glycopyrrolate and clomipramine and recommend discontinuation of these 

medications upon discharge as these are anticholinergic drugs and well known 

adverse reaction is bowel obstruction. 


-Consult placed to psychiatry for assistance with medication management as 

patient will need to be placed on additional antipsychotics, possibly Cogentin 

secondary to discontinuation of above. Discussed with Dr. Velez whom stated 

he will reevaluate patient tomorrow morning and make medication recommendations 

at that time.


-Lithium level reviewed and was therapeutic at 0.8.














CODE STATUS: Full code


DVT prophylaxis: SCDs


Discussed with: Patient, ED physician, RN, and general surgery PA.


Anticipated discharge date: Clinical course to determine


Anticipated discharge place: Home


PATIENT SEEN INDEPENDENTLY BY NURSE PRACTITIONER.


This document was prepared using Dragon dictation software.  Please allow for 

errors in transcription while rare they do occur.





Jb Blackmon NP rendered care for this patient independently, reviewed the 

findings and plan as documented in the note above.  I did not physically speak 

with or examine the patient on this date. 





Objective





- Vital Signs


Vital signs: 


                                   Vital Signs











Temp  97.8 F   03/07/23 07:30


 


Pulse  101 H  03/07/23 07:30


 


Resp  18   03/07/23 07:30


 


BP  144/88   03/07/23 07:30


 


Pulse Ox  90 L  03/07/23 07:30


 


FiO2      








                                 Intake & Output











 03/06/23 03/07/23 03/07/23





 18:59 06:59 18:59


 


Output Total 135 1150 


 


Balance -135 -1150 


 


Weight 104.326 kg  


 


Output:   


 


  Urine 135 1150 


 


Other:   


 


  Voiding Method  Urinal 


 


  # Voids 1 0 














- Labs


CBC & Chem 7: 


                                 03/10/23 07:28





                                 03/10/23 07:28


Labs: 


                  Abnormal Lab Results - Last 24 Hours (Table)











  03/06/23 03/06/23 03/06/23 Range/Units





  10:04 10:04 10:04 


 


WBC  18.1 H    (3.8-10.6)  k/uL


 


RBC     (4.30-5.90)  m/uL


 


Hgb  12.6 L    (13.0-17.5)  gm/dL


 


Hct  36.9 L    (39.0-53.0)  %


 


Neutrophils #  16.0 H    (1.3-7.7)  k/uL


 


Lymphocytes #  0.9 L    (1.0-4.8)  k/uL


 


Sodium   132 L   (137-145)  mmol/L


 


Chloride   95 L   ()  mmol/L


 


BUN   59 H   (9-20)  mg/dL


 


Creatinine   2.11 H   (0.66-1.25)  mg/dL


 


Glucose   252 H   (74-99)  mg/dL


 


Calcium     (8.4-10.2)  mg/dL


 


Magnesium     (1.6-2.3)  mg/dL


 


Total Creatine Kinase    38 L  ()  U/L


 


Urine Protein     (Negative)  


 


Hyaline Casts     (0-2)  /lpf


 


Urine Mucus     (None)  /hpf














  03/06/23 03/06/23 03/07/23 Range/Units





  14:43 18:50 06:04 


 


WBC   13.0 H   (3.8-10.6)  k/uL


 


RBC   3.71 L  3.43 L  (4.30-5.90)  m/uL


 


Hgb   11.0 L  10.2 L  (13.0-17.5)  gm/dL


 


Hct   33.2 L  30.4 L  (39.0-53.0)  %


 


Neutrophils #     (1.3-7.7)  k/uL


 


Lymphocytes #    0.7 L  (1.0-4.8)  k/uL


 


Sodium     (137-145)  mmol/L


 


Chloride     ()  mmol/L


 


BUN     (9-20)  mg/dL


 


Creatinine     (0.66-1.25)  mg/dL


 


Glucose     (74-99)  mg/dL


 


Calcium     (8.4-10.2)  mg/dL


 


Magnesium     (1.6-2.3)  mg/dL


 


Total Creatine Kinase     ()  U/L


 


Urine Protein  1+ H    (Negative)  


 


Hyaline Casts  26 H    (0-2)  /lpf


 


Urine Mucus  Rare H    (None)  /hpf














  03/07/23 Range/Units





  06:04 


 


WBC   (3.8-10.6)  k/uL


 


RBC   (4.30-5.90)  m/uL


 


Hgb   (13.0-17.5)  gm/dL


 


Hct   (39.0-53.0)  %


 


Neutrophils #   (1.3-7.7)  k/uL


 


Lymphocytes #   (1.0-4.8)  k/uL


 


Sodium   (137-145)  mmol/L


 


Chloride   ()  mmol/L


 


BUN  42 H  (9-20)  mg/dL


 


Creatinine  1.28 H  (0.66-1.25)  mg/dL


 


Glucose  140 H  (74-99)  mg/dL


 


Calcium  8.0 L  (8.4-10.2)  mg/dL


 


Magnesium  2.4 H  (1.6-2.3)  mg/dL


 


Total Creatine Kinase   ()  U/L


 


Urine Protein   (Negative)  


 


Hyaline Casts   (0-2)  /lpf


 


Urine Mucus   (None)  /hpf

## 2023-03-07 NOTE — P.ANPRN
Procedure Note - Anesthesia





- Nerve Block Performed


  ** Bilateral Erector Spinae Single


Time Out Performed: Yes (1110)


Date of Procedure: 03/07/23


Procedure Start Time: 11:11


Procedure Stop Time: 11:17


Location of Patient: PreOp


Indication: Acute Post-Operative Pain, Requested by Surgeon


Specifically requested for management of pain by : Elmo Mcgowan


Sedation Type: Sedate with meaningful contact maintained


Preparation: Sterile Prep


Position: Sitting


Catheter: None


Needle Types: Pajunk


Needle Gauge: 21


Ultrasound used to visualize needle placement: Yes


Ultrasound used to observe medication spread: Yes


Injectate: 0.5% Ropivacaine (see comment for volume) (15cc +10cc nacl pf each 

side)


Narrative: 





t10





Blood Aspirated: No


Pain Paresthesia on Injection Noted: No


Resistance on Injection: Normal


Image Stored and Saved: Yes


Events: Uneventful and Well Tolerated

## 2023-03-07 NOTE — P.PN
Progress Note - Text


Progress Note Date: 03/07/23





This provider attempted to assess the patient at approximately 1:35 PM on 03 /07/2023.  Patient is currently in the operating room.  Psychiatric consult was 

placed for evaluation of medication management as the patient is prescribed 

anticholinergic medications.  Review of the patient's psychotropic medications 

reveals that the patient is currently prescribed Clozaril, lithium, and 

Lamictal.  Clozaril is an antipsychotic medication for severely treatment 

resistant psychotic disorders.  It is likely recommended that the patient 

continue this medication as there are issues with gastric intestinal motility 

with any antipsychotic medication and he is likely prescribed this medication 

when other medication trials have failed.  Symptomatic management with stool 

softeners and high fiber diet are recommended.





Furthermore, the patient is also provided pain management with Dilaudid which 

may also contribute to hypomanic motility of the gastric intestinal tract.





Psychiatry will reattempt to evaluate the patient tomorrow.  Please call us with

any questions or concerns.





Reno Velez MD

## 2023-03-08 LAB
ALBUMIN SERPL-MCNC: 3 G/DL (ref 3.8–4.9)
ALBUMIN/GLOB SERPL: 1.58 G/DL (ref 1.6–3.17)
ALP SERPL-CCNC: 55 U/L (ref 41–126)
ALT SERPL-CCNC: 12 U/L (ref 10–49)
ANION GAP SERPL CALC-SCNC: 9.6 MMOL/L (ref 10–18)
AST SERPL-CCNC: 16 U/L (ref 14–35)
BASOPHILS # BLD AUTO: 0 K/UL (ref 0–0.2)
BASOPHILS NFR BLD AUTO: 0 %
BUN SERPL-SCNC: 22.8 MG/DL (ref 9–27)
BUN/CREAT SERPL: 20.73 RATIO (ref 12–20)
CALCIUM SPEC-MCNC: 8.4 MG/DL (ref 8.7–10.3)
CHLORIDE SERPL-SCNC: 116 MMOL/L (ref 96–109)
CO2 SERPL-SCNC: 20.4 MMOL/L (ref 20–27.5)
EOSINOPHIL # BLD AUTO: 0 K/UL (ref 0–0.7)
EOSINOPHIL NFR BLD AUTO: 0 %
ERYTHROCYTE [DISTWIDTH] IN BLOOD BY AUTOMATED COUNT: 3.35 M/UL (ref 4.3–5.9)
ERYTHROCYTE [DISTWIDTH] IN BLOOD: 14.4 % (ref 11.5–15.5)
GLOBULIN SER CALC-MCNC: 1.9 G/DL (ref 1.6–3.3)
GLUCOSE BLD-MCNC: 149 MG/DL (ref 70–110)
GLUCOSE BLD-MCNC: 156 MG/DL (ref 70–110)
GLUCOSE BLD-MCNC: 159 MG/DL (ref 70–110)
GLUCOSE BLD-MCNC: 160 MG/DL (ref 70–110)
GLUCOSE BLD-MCNC: 175 MG/DL (ref 70–110)
GLUCOSE BLD-MCNC: 221 MG/DL (ref 70–110)
GLUCOSE SERPL-MCNC: 149 MG/DL (ref 70–110)
HCT VFR BLD AUTO: 30.5 % (ref 39–53)
HGB BLD-MCNC: 9.9 GM/DL (ref 13–17.5)
LYMPHOCYTES # SPEC AUTO: 0.6 K/UL (ref 1–4.8)
LYMPHOCYTES NFR SPEC AUTO: 8 %
MAGNESIUM SPEC-SCNC: 2.4 MG/DL (ref 1.5–2.4)
MCH RBC QN AUTO: 29.5 PG (ref 25–35)
MCHC RBC AUTO-ENTMCNC: 32.5 G/DL (ref 31–37)
MCV RBC AUTO: 90.9 FL (ref 80–100)
MONOCYTES # BLD AUTO: 0.5 K/UL (ref 0–1)
MONOCYTES NFR BLD AUTO: 6 %
NEUTROPHILS # BLD AUTO: 6.4 K/UL (ref 1.3–7.7)
NEUTROPHILS NFR BLD AUTO: 83 %
PLATELET # BLD AUTO: 221 K/UL (ref 150–450)
POTASSIUM SERPL-SCNC: 4 MMOL/L (ref 3.5–5.5)
PROT SERPL-MCNC: 4.9 G/DL (ref 6.2–8.2)
SODIUM SERPL-SCNC: 146 MMOL/L (ref 135–145)
WBC # BLD AUTO: 7.7 K/UL (ref 3.8–10.6)

## 2023-03-08 RX ADMIN — CEFEPIME HYDROCHLORIDE SCH MLS/HR: 2 INJECTION, POWDER, FOR SOLUTION INTRAVENOUS at 08:41

## 2023-03-08 RX ADMIN — CEFAZOLIN SCH: 330 INJECTION, POWDER, FOR SOLUTION INTRAMUSCULAR; INTRAVENOUS at 05:33

## 2023-03-08 RX ADMIN — PANTOPRAZOLE SODIUM SCH MG: 40 INJECTION, POWDER, FOR SOLUTION INTRAVENOUS at 19:58

## 2023-03-08 RX ADMIN — HEPARIN SODIUM SCH UNIT: 5000 INJECTION INTRAVENOUS; SUBCUTANEOUS at 19:59

## 2023-03-08 RX ADMIN — CEFEPIME HYDROCHLORIDE SCH MLS/HR: 2 INJECTION, POWDER, FOR SOLUTION INTRAVENOUS at 17:03

## 2023-03-08 RX ADMIN — HYDROMORPHONE HYDROCHLORIDE PRN MG: 1 INJECTION, SOLUTION INTRAMUSCULAR; INTRAVENOUS; SUBCUTANEOUS at 04:50

## 2023-03-08 RX ADMIN — HEPARIN SODIUM SCH UNIT: 5000 INJECTION INTRAVENOUS; SUBCUTANEOUS at 11:08

## 2023-03-08 RX ADMIN — LEVOTHYROXINE SODIUM SCH MCG: 50 TABLET ORAL at 05:32

## 2023-03-08 RX ADMIN — SODIUM BICARBONATE SCH MLS/HR: 84 INJECTION, SOLUTION INTRAVENOUS at 12:50

## 2023-03-08 RX ADMIN — PROPRANOLOL HYDROCHLORIDE SCH: 80 CAPSULE, EXTENDED RELEASE ORAL at 08:29

## 2023-03-08 RX ADMIN — ATORVASTATIN CALCIUM SCH MG: 10 TABLET, FILM COATED ORAL at 19:58

## 2023-03-08 RX ADMIN — POTASSIUM CHLORIDE SCH: 14.9 INJECTION, SOLUTION INTRAVENOUS at 08:47

## 2023-03-08 RX ADMIN — HYDROMORPHONE HYDROCHLORIDE PRN MG: 1 INJECTION, SOLUTION INTRAMUSCULAR; INTRAVENOUS; SUBCUTANEOUS at 21:45

## 2023-03-08 RX ADMIN — METRONIDAZOLE SCH MLS/HR: 500 INJECTION, SOLUTION INTRAVENOUS at 17:03

## 2023-03-08 RX ADMIN — LITHIUM CARBONATE SCH MG: 300 CAPSULE, GELATIN COATED ORAL at 19:59

## 2023-03-08 RX ADMIN — CEFAZOLIN SCH MLS/HR: 330 INJECTION, POWDER, FOR SOLUTION INTRAMUSCULAR; INTRAVENOUS at 08:42

## 2023-03-08 RX ADMIN — PANTOPRAZOLE SODIUM SCH MG: 40 INJECTION, POWDER, FOR SOLUTION INTRAVENOUS at 08:38

## 2023-03-08 RX ADMIN — HYDROMORPHONE HYDROCHLORIDE PRN MG: 1 INJECTION, SOLUTION INTRAMUSCULAR; INTRAVENOUS; SUBCUTANEOUS at 17:04

## 2023-03-08 RX ADMIN — METRONIDAZOLE SCH MLS/HR: 500 INJECTION, SOLUTION INTRAVENOUS at 08:40

## 2023-03-08 RX ADMIN — ACETAMINOPHEN SCH MLS/HR: 10 INJECTION, SOLUTION INTRAVENOUS at 18:32

## 2023-03-08 NOTE — P.PN
Subjective


Progress Note Date: 03/08/23





Hospital course:





Patient is a very pleasant 57-year-old male with a past medical history of type 

II non-insulin-dependent diabetes mellitus, hypertension, hyperlipidemia, GERD, 

schizoaffective disorder, anxiety, bipolar, depression, and previous inguinal 

hernia with repair.  Presented to the emergency department with a chief 

complaint of abdominal/epigastric/chest pain, nausea, vomiting, abdominal 

distention and constipation 2 weeks.  Patient reports vomiting dark black 

coffee-colored emesis and inability to have a bowel movement 2 weeks.  He 

reports pain institute diffuse abdomen radiating up into his epigastric region 

and chest.  He denies having any fevers, chills, palpitations, shortness of 

breath, cough or congestion, hemoptysis, melena, or hematochezia.  Patient 

reports decreased appetite and inability to hold down oral intake.  He does 

report decreased urinary output, but denies any retention or difficulties 

initiating a stream.  Reports concerns of dehydration.  Patient underwent full 

evaluation in the emergency department.  Labs completed and reviewed.  He was 

found to have leukocytosis with WBC count of 18.1 and normocytic anemia with 

hemoglobin of 12.6.  Coagulation profile normal findings.  BMP revealing 

hyponatremia with sodium 132 and hypochloremia with chloride 95 and acute kidney

injury with BUN 59, creatinine 2.11, and GFR of 34 as well as hyperglycemia with

glucose of 252.  Troponin less than 0.012.  EKG showing sinus tachycardia at 102

bpm with T-wave inversion in lateral leads I and aVL, T-wave inversion is new 

finding when compared to previous EKG completed 2/3/20.  Chest x-ray completed 

and report reviewed stating limited inspiration with bilateral consolidation and

right basilar atelectasis or infiltrate.  CT abdomen and pelvis without contrast

completed and radiology report reviewed showing dilated small bowel, 

cecum/ascending colon with transition point within the proximal third of the 

transverse colon with focal narrowing in none distention of the remainder of the

colon, findings possibly representing stricture versus mass, small bowel 

dilation with small bowel feces throughout suggesting delayed transit/fecal 

stasis and dilated stomach and esophagus filled with ingested contents.  

Discussed these  findings with the ED physicianin detail and patient was 

accepted for admission to general surgery unit with telemetry under our services

with consultation to general surgery.  Patient taken for colectomy on 3/7/23.





Physical exam:





Patient seen and fully evaluated at bedside this morning.  He is postoperative 

day 1.  Patient sitting up in chair and reports controlled postoperative pain at

this time.  NG tube remains in place to low intermittent suction.  Patient 

denies any nausea or vomiting.  Joaquin catheter in place.





Vital signs reviewed and stable. 


General: Nontoxic, no distress and appears stated age.  


Derm: Skin warm and dry, normal coloration for ethnicity.


Head: Atraumatic, normocephalic and symmetric.  


Eyes: EOMs intact, no lid lag, and anicteric sclera


Mouth: no lip lesions, mucus membranes moist


Cardiovascular: regular rate and rhythm with normal S1S2, no murmur, positive 

posterior tibial pulses bilaterally, and cap refill < 2 seconds.  


Lungs: Respirations even, regular, and unlabored on room air. Lungs CTA 

bilaterally, no rhonchi, no rales, no wheezing, and no accessory muscle usage. 


Abdominal: Abdomen taught pal distended with diffuse tenderness upon pation, no 

guarding, no appreciable organomegaly


Ext: ROM intact. No gross muscle atrophy, no edema, no contractures


Neuro: Speech clear, face symmetrical and CN II-XII grossly intact with no noted

focal neuro deficits


Psych: Alert and oriented to person, place, time, and situation. Appropriate and

pleasant affect. 





Assessment and Plan of Care:





Hyperchloremic hypernatremia


-Reviewed morning labs.  BMP revealing elevated sodium of 146 and elevated 

chloride of 116, patient has been receiving IV fluid hydration with 0.9% normal 

saline at 100 mL's per hour.


-0.9% normal saline discontinued and patient started on 0.45% NS at 75 mL's per 

hour. 


-Order placed for repeat morning BMP to follow up on sodium and chloride results

and additional changes to infusion rate/fluid to be made based upon these f

indings.





Small bowel obstruction, status post colectomy on 3/7/23, postoperative day 1


Normocytic anemia, stable


-Labs completed and reviewed.  CBC continued revealing resolution of 

leukocytosis with WBC 7.7 and stable normocytic anemia with hemoglobin of 9.9. 

BMP revealing total resolution of ROBERT with BUN 22.8, creatinine 1.1, and GFR of 

75.2.


-Discussed with general surgery PA and patient to remain nothing by mouth until 

24 hours postop and diet may then be advanced to clear liquids.


-GI bleed was ruled out, patient to continue with Protonix 40 mg IVP twice daily

for GI prophylaxis at this time.


-Order placed for repeat morning CBC to follow up on normocytic anemia.





EKG changes


-EKG showing sinus tachycardia at 102 bpm with T-wave inversion in lateral leads

I and aVL, T-wave inversion is new finding when compared to previous EKG 

completed 2/3/20 upon personal review and interpretation of both.


-Heart score 3. with risk of MACE 0.9-1.7%.  Troponins were trended overnight 

all resulting at less than 0.0123 draws.  Patient asymptomatic at this time.  

Therefore recommending no need for further cardiac testing or evaluation at this

time.





Diabetes mellitus with hyperglycemia


-Continue glycemic protocol with NovoLog sliding scale every 4 hours while 

patient remains nothing by mouth.  Fasting blood glucose this morning 160.





Schizoaffective disorder


Anxiety and depression


Bipolar disorder


-Resume patient's home medication regimen with Lamictal, Clozaril and lithium. 


-Hold Glycopyrrolate and clomipramine and recommend discontinuation of these 

medications upon discharge as these are anticholinergic drugs and well known 

adverse reaction is bowel obstruction. 


-Consult placed to psychiatry for assistance with medication management as 

patient will need to be placed on additional antipsychotics, possibly Cogentin 

secondary to discontinuation of above. 


-Lithium level reviewed and was therapeutic at 0.8.





Resolved:


Abdominal pain with intractable nausea and vomiting.  Secondary to small bowel 

obstruction.  Resolved.


Acute kidney injury, secondary to dehydration.  Resolved.


Dehydration secondary to intractable nausea and vomiting. Resolved.


Leukocytosis, Resolved.


Hyponatremia, resolved


Hypochloremia, resolved





CODE STATUS: Full code


DVT prophylaxis: SCDs


Discussed with: Patient, RN, and general surgery PA.


Anticipated discharge date: Clinical course to determine


Anticipated discharge place: Home





Patient was seen independently by nurse practitioner.





This document was prepared using Dragon dictation software.  Please allow for 

errors in transcription while rare they do occur.





Jb Blackmon NP rendered care for this patient independently, reviewed the 

findings and plan as documented in the note above.  I did not physically speak 

with or examine the patient on this date. 





Objective





- Vital Signs


Vital signs: 


                                   Vital Signs











Temp  98.7 F   03/08/23 07:25


 


Pulse  110 H  03/08/23 07:25


 


Resp  18   03/08/23 07:25


 


BP  149/82   03/08/23 07:25


 


Pulse Ox  93 L  03/08/23 07:25


 


FiO2      








                                 Intake & Output











 03/07/23 03/08/23 03/08/23





 18:59 06:59 18:59


 


Intake Total 1100  


 


Output Total 1720 1300 


 


Balance -620 -1300 


 


Weight 104.326 kg  


 


Intake:   


 


  IV 1100  


 


Output:   


 


  Urine 1670 1300 


 


  Estimated Blood Loss 50  


 


Other:   


 


  Voiding Method Urinal Indwelling Catheter 














- Labs


CBC & Chem 7: 


                                 03/10/23 07:28





                                 03/10/23 07:28


Labs: 


                  Abnormal Lab Results - Last 24 Hours (Table)











  03/07/23 03/07/23 03/07/23 Range/Units





  01:05 06:04 09:14 


 


WBC  11.05 H    (4.50-10.00)  X 10*3/uL


 


RBC  3.47 L    (4.40-5.60)  X 10*6/uL


 


Hgb  10.0 L    (13.0-17.0)  g/dL


 


Hct  31.4 L    (39.6-50.0)  %


 


MCHC  31.8 L    (32.0-37.0)  g/dL


 


Lymphocytes #     (1.0-4.8)  k/uL


 


POC Glucose (mg/dL)    135 H  ()  mg/dL


 


Hemoglobin A1c   6.9 H   (0.0-6.0)  %














  03/07/23 03/07/23 03/07/23 Range/Units





  11:02 17:46 21:18 


 


WBC     (4.50-10.00)  X 10*3/uL


 


RBC     (4.40-5.60)  X 10*6/uL


 


Hgb     (13.0-17.0)  g/dL


 


Hct     (39.6-50.0)  %


 


MCHC     (32.0-37.0)  g/dL


 


Lymphocytes #     (1.0-4.8)  k/uL


 


POC Glucose (mg/dL)  154 H  156 H  132 H  ()  mg/dL


 


Hemoglobin A1c     (0.0-6.0)  %














  03/08/23 03/08/23 03/08/23 Range/Units





  00:48 05:14 07:12 


 


WBC     (4.50-10.00)  X 10*3/uL


 


RBC    3.35 L  (4.40-5.60)  X 10*6/uL


 


Hgb    9.9 L  (13.0-17.0)  g/dL


 


Hct    30.5 L  (39.6-50.0)  %


 


MCHC     (32.0-37.0)  g/dL


 


Lymphocytes #    0.6 L  (1.0-4.8)  k/uL


 


POC Glucose (mg/dL)  156 H  149 H   ()  mg/dL


 


Hemoglobin A1c     (0.0-6.0)  %

## 2023-03-08 NOTE — P.PN
Subjective


Progress Note Date: 03/08/23





CHIEF COMPLAINT: Colonic obstruction





HISTORY OF PRESENT ILLNESS: Patient is postop day #1 status post right colectomy

and partial omentectomy for possible stricture of the transverse colon and 

massive dilated proximal transverse right colon and small bowel.  Patient 

complains of abdominal pain at incision site.  He reports his pain about a 6 out

of 10.  He reports that his pain is different than his admitting pain.  Denies 

any nausea.  Does have NG tube in place with 500 mL brownish output.  Denies any

bowel activity.  Afebrile.  Mildly tachycardic.  WBC is 7.7 Hgb 9.9 pulse to 21 

sodium is 146 potassium is 4.0 creatinine 1.1





PHYSICAL EXAM: 


VITAL SIGNS: Reviewed.


GENERAL: Well-developed in no acute distress. 


HEENT:  No sclera icterus. Extraocular movements grossly intact.  Moist buccal 

mucosa. Head is atraumatic, normocephalic. 


ABDOMEN:  Softer than yesterday.  Distended.  Prevana wound VAC in place.  

Incisional dressing clean, dry and intact


NEUROLOGIC: Alert and oriented. Cranial nerves II through XII grossly intact.





ASSESSMENT: 


1.  Massively dilated proximal transverse right colon and small bowel with 

possible stricture of the transverse colon status post right colectomy and pa

rtial omentectomy








PLAN: 


-Continue NG tube for decompression


-Keep patient nothing by mouth


-Continue IV fluids


-IV Tylenol added for pain


-Continue IV Dilaudid PRN


-Encouraged patient to use incentive spirometer


-GI prophylaxis Protonix and DVT prophylaxis subcu heparin





Physician Assistant note has been reviewed by physician. Signing provider agrees

with the documented findings, assessment, and plan of care. 





Objective





- Vital Signs


Vital signs: 


                                   Vital Signs











Temp  98.7 F   03/08/23 07:25


 


Pulse  110 H  03/08/23 07:25


 


Resp  18   03/08/23 07:25


 


BP  149/82   03/08/23 07:25


 


Pulse Ox  93 L  03/08/23 07:25


 


FiO2      








                                 Intake & Output











 03/07/23 03/08/23 03/08/23





 18:59 06:59 18:59


 


Intake Total 1100  


 


Output Total 1720 1300 


 


Balance -620 -1300 


 


Weight 104.326 kg  


 


Intake:   


 


  IV 1100  


 


Output:   


 


  Urine 1670 1300 


 


  Estimated Blood Loss 50  


 


Other:   


 


  Voiding Method Urinal Indwelling Catheter 














- Labs


CBC & Chem 7: 


                                 03/08/23 07:12





                                 03/08/23 07:12


Labs: 


                  Abnormal Lab Results - Last 24 Hours (Table)











  03/07/23 03/07/23 03/07/23 Range/Units





  06:04 11:02 17:46 


 


RBC     (4.30-5.90)  m/uL


 


Hgb     (13.0-17.5)  gm/dL


 


Hct     (39.0-53.0)  %


 


Lymphocytes #     (1.0-4.8)  k/uL


 


Sodium     (135-145)  mmol/L


 


Chloride     ()  mmol/L


 


Anion Gap     (10.00-18.00)  mmol/L


 


BUN/Creatinine Ratio     (12.00-20.00)  Ratio


 


Glucose     ()  mg/dL


 


POC Glucose (mg/dL)   154 H  156 H  ()  mg/dL


 


Hemoglobin A1c  6.9 H    (0.0-6.0)  %


 


Calcium     (8.7-10.3)  mg/dL


 


Total Bilirubin     (0.30-1.20)  mg/dL


 


Total Protein     (6.2-8.2)  g/dL


 


Albumin     (3.8-4.9)  g/dL


 


Albumin/Globulin Ratio     (1.60-3.17)  g/dL














  03/07/23 03/08/23 03/08/23 Range/Units





  21:18 00:48 05:14 


 


RBC     (4.30-5.90)  m/uL


 


Hgb     (13.0-17.5)  gm/dL


 


Hct     (39.0-53.0)  %


 


Lymphocytes #     (1.0-4.8)  k/uL


 


Sodium     (135-145)  mmol/L


 


Chloride     ()  mmol/L


 


Anion Gap     (10.00-18.00)  mmol/L


 


BUN/Creatinine Ratio     (12.00-20.00)  Ratio


 


Glucose     ()  mg/dL


 


POC Glucose (mg/dL)  132 H  156 H  149 H  ()  mg/dL


 


Hemoglobin A1c     (0.0-6.0)  %


 


Calcium     (8.7-10.3)  mg/dL


 


Total Bilirubin     (0.30-1.20)  mg/dL


 


Total Protein     (6.2-8.2)  g/dL


 


Albumin     (3.8-4.9)  g/dL


 


Albumin/Globulin Ratio     (1.60-3.17)  g/dL














  03/08/23 03/08/23 03/08/23 Range/Units





  07:12 07:12 08:50 


 


RBC  3.35 L    (4.30-5.90)  m/uL


 


Hgb  9.9 L    (13.0-17.5)  gm/dL


 


Hct  30.5 L    (39.0-53.0)  %


 


Lymphocytes #  0.6 L    (1.0-4.8)  k/uL


 


Sodium   146 H   (135-145)  mmol/L


 


Chloride   116 H   ()  mmol/L


 


Anion Gap   9.60 L   (10.00-18.00)  mmol/L


 


BUN/Creatinine Ratio   20.73 H   (12.00-20.00)  Ratio


 


Glucose   149 H   ()  mg/dL


 


POC Glucose (mg/dL)    160 H  ()  mg/dL


 


Hemoglobin A1c     (0.0-6.0)  %


 


Calcium   8.4 L   (8.7-10.3)  mg/dL


 


Total Bilirubin   0.20 L   (0.30-1.20)  mg/dL


 


Total Protein   4.9 L   (6.2-8.2)  g/dL


 


Albumin   3.0 L   (3.8-4.9)  g/dL


 


Albumin/Globulin Ratio   1.58 L   (1.60-3.17)  g/dL

## 2023-03-08 NOTE — P.CN
Psychiatric Consult





- .


Consult date: 03/08/23


Consult:: 





03/08/23 13:56


IDENTIFYING DATA: This patient is a 57-year-old  male with a 

significant history of schizoaffective disorder, depressive type presents to our

hospital on 03/06/2023 with complaints of nausea, vomiting, and chest pain.





HISTORY OF PRESENT ILLNESS: The patient presented to the hospital on [address] is 2023, presented to emergency department with complaints of nausea, vomiting, 

chest pain, and was noted to have a very distended abdomen.  Furthermore, the 

patient reported vomiting coffee colored emesis and no bowel movement for the 

past 2 weeks.  The patient was admitted for bowel obstruction and underwent a 

right colectomy and partial omentectomy on 03/07/2023.  Psychiatry has been 

consulted for evaluation and management of the patient's medications as they are

anticholinergic.


Upon assessment by this provider, the patient is currently denying any suicidal 

or homicidal ideation, intention, and/or plan.  He reports no access to firearms

or other weapons.  He does report that he does experience auditory 

hallucinations however states that they are not severe at this time.  He reports

that they are constantly present.  He denies any current visual hallucinations 

however he also states that they are often present.  He also reports that they 

are not severe.  He is not endorsing any paranoia or other delusions.  He does 

report mild depression but states that this is situational due to his current 

medical issues.  He does report some trouble sleeping but also attributes this 

to his medical issues.  He denies any significant symptoms of rigoberto or 

hypomania.  He denies any increased goal-directed activity, grandiosity, or mood

lability.


The patient is currently on a home regimen that includes Clozaril, lithium, 

clomipramine, Lamictal, and glycopyrrolate.  Glycopyrrolate and clomipramine 

have been held due to anticholinergic effects however the patient was continued 

on Clozaril, lithium, and Lamictal.


The patient currently expresses no issues or concerns and states that he is okay

on holding the clomipramine at this time as he is uncertain as to how that 

medication has been fighting any significant benefit.  He acknowledges that he 

would follow-up in the outpatient setting with [facility] or come back to the hospital 

if he is experiencing any worsening of his psychiatric symptoms.





PAST PSYCHIATRIC HISTORY: Patient has a history of schizoaffective disorder, 

depressive type, obsessive-compulsive disorder, and alcohol use disorder .  As 

per chart review, the patient has been on a regimen of BuSpar, Lamictal, 

lithium, Luvox, Klonopin, and other psychiatric medications he cannot recall in 

the past.  He was last hospitalized on our psychiatric unit in January 2020.  

Prior to that, the patient has over 20 inpatient psychiatric admissions.  He is 

open with [facility]. 





PAST MEDICAL HISTORY:  


Past Medical History: Diabetes Mellitus, GERD/Reflux, Hyperlipidemia, 

Hypertension, Sleep Apnea/CPAP/BIPAP


Additional Past Medical History / Comment(s): Family history of premature 

coronary artery disease. OCD.


History of Any Multi-Drug Resistant Organisms: None Reported


Past Surgical History: Hernia Repair


Past Anesthesia/Blood Transfusion Reactions: No Reported Reaction


Past Psychological History: Anxiety, Bipolar, Depression, Schizoaffective 

Disorder


Smoking Status: Never smoker


Past Alcohol Use History: None Reported


Past Drug Use History: None Reported





ALLERGIES: Penicillin, risperidone, Depakote





CHEMICAL DEPENDENCY HISTORY: Patient has a history of severe alcohol use 

disorder however is currently denying any substance abuse.  He denies any 

tobacco, alcohol, marijuana, or illicit drug use.





FAMILY PSYCHIATRIC/SUBSTANCE USE HISTORY: No reported family psychiatric 

history.





SOCIAL HISTORY: Patient was born in [place] and raised in [place].  He 

graduated high school.  He reports good support.  He is single, never , 

has no children.  He receives Social Security disability.  Currently lives 

alone.  He has 2 sisters.





MENTAL STATUS EXAM: 


General Appearance: Patient appears to be stated age is alert, pleasant, and 

cooperative. Patient appears to have fair hygiene and grooming wearing hospital 

gown with fair eye contact.  Patient has a urine catheter in place.


Behavior: Patient is calmly sitting upright in his chair without any agitated 

behavior.


Speech: Patient's speech is fluent and nonpressured. 


Mood/Affect: Patient reports their mood is "a little depressed because of my 

health", affect is euthymic with appropriate range


Suicidality/Homicidality:  Patient denies having any suicidal or homicidal 

ideation intent or plan.  


Perceptions: Patient endorses auditory hallucinations.  Denies any current 

visual hallucinations.


Though content/process: There is no evidence of any delusional thought content 

and thought process is linear and goal-directed. 


Memory and concentration: AOX3, grossly intact for the purposes of this session.

Can spell "WORLD" backwards


Judgment and insight: Good





                                   Vital Signs











Temp  98.2 F   03/08/23 12:44


 


Pulse  111 H  03/08/23 12:44


 


Resp  16   03/08/23 12:44


 


BP  124/84   03/08/23 12:44


 


Pulse Ox  99   03/08/23 12:44


 


FiO2      








                                 Intake & Output











 03/07/23 03/08/23 03/08/23





 18:59 06:59 18:59


 


Intake Total 1100  


 


Output Total 1720 1300 


 


Balance -620 -1300 


 


Weight 104.326 kg  


 


Intake:   


 


  IV 1100  


 


Output:   


 


  Urine 1670 1300 


 


  Estimated Blood Loss 50  


 


Other:   


 


  Voiding Method Urinal Indwelling Catheter Indwelling Catheter








                               Laboratory Results











WBC  7.7 k/uL (3.8-10.6)   03/08/23  07:12    


 


RBC  3.35 m/uL (4.30-5.90)  L  03/08/23  07:12    


 


Hgb  9.9 gm/dL (13.0-17.5)  L  03/08/23  07:12    


 


Hct  30.5 % (39.0-53.0)  L  03/08/23  07:12    


 


MCV  90.9 fL (80.0-100.0)   03/08/23  07:12    


 


MCH  29.5 pg (25.0-35.0)   03/08/23  07:12    


 


MCHC  32.5 g/dL (31.0-37.0)   03/08/23  07:12    


 


RDW  14.4 % (11.5-15.5)   03/08/23  07:12    


 


Plt Count  221 k/uL (150-450)   03/08/23  07:12    


 


MPV  7.4   03/08/23  07:12    


 


Absolute Nucleated RBC  0 X 10*3/uL (0.00-0.00)   03/07/23  01:05    


 


Neutrophils %  83 %  03/08/23  07:12    


 


Lymphocytes %  8 %  03/08/23  07:12    


 


Monocytes %  6 %  03/08/23  07:12    


 


Eosinophils %  0 %  03/08/23  07:12    


 


Basophils %  0 %  03/08/23  07:12    


 


Neutrophils #  6.4 k/uL (1.3-7.7)   03/08/23  07:12    


 


Lymphocytes #  0.6 k/uL (1.0-4.8)  L  03/08/23  07:12    


 


Monocytes #  0.5 k/uL (0-1.0)   03/08/23  07:12    


 


Eosinophils #  0.0 k/uL (0-0.7)   03/08/23  07:12    


 


Basophils #  0.0 k/uL (0-0.2)   03/08/23  07:12    


 


NRBC/100 WBC Diff  0 /100 WBCS (0.0-0.0)   03/07/23  01:05    


 


PT  10.8 sec (9.0-12.0)   03/06/23  10:04    


 


INR  1.0  (<1.2)   03/06/23  10:04    


 


APTT  22.7 sec (22.0-30.0)   03/06/23  10:04    


 


Sodium  146 mmol/L (135-145)  H  03/08/23  07:12    


 


Potassium  4.0 mmol/L (3.5-5.5)   03/08/23  07:12    


 


Chloride  116 mmol/L ()  H  03/08/23  07:12    


 


Carbon Dioxide  20.4 mmol/L (20.0-27.5)   03/08/23  07:12    


 


Anion Gap  9.60 mmol/L (10.00-18.00)  L  03/08/23  07:12    


 


BUN  22.8 mg/dL (9.0-27.0)   03/08/23  07:12    


 


Creatinine  1.1 mg/dL (0.6-1.5)   03/08/23  07:12    


 


Est GFR (CKD-EPI)AfAm  87.1   (60.0-200.0)   03/08/23  07:12    


 


Est GFR (CKD-EPI)NonAf  75.2   (60.0-200.0)   03/08/23  07:12    


 


BUN/Creatinine Ratio  20.73 Ratio (12.00-20.00)  H  03/08/23  07:12    


 


Glucose  149 mg/dL ()  H  03/08/23  07:12    


 


POC Glucose (mg/dL)  175 mg/dL ()  H  03/08/23  13:12    


 


POC Glu Operater ID  Tae Ugalde   03/08/23  13:12    


 


Estimated Ave Glu mg/dL  152   03/07/23  06:04    


 


Hemoglobin A1c  6.9 % (0.0-6.0)  H  03/07/23  06:04    


 


Plasma Lactic Acid Broderick  1.2 mmol/L (0.7-2.0)   03/06/23  10:04    


 


Calcium  8.4 mg/dL (8.7-10.3)  L  03/08/23  07:12    


 


Magnesium  2.4 mg/dL (1.5-2.4)   03/08/23  07:12    


 


Total Bilirubin  0.20 mg/dL (0.30-1.20)  L  03/08/23  07:12    


 


AST  16 U/L (14-35)   03/08/23  07:12    


 


ALT  12 U/L (10-49)   03/08/23  07:12    


 


Alkaline Phosphatase  55 U/L ()   03/08/23  07:12    


 


Total Creatine Kinase  38 U/L ()  L  03/06/23  10:04    


 


CK-MB (CK-2)  1.6 ng/mL (0.0-2.4)   03/06/23  10:04    


 


CK-MB (CK-2) Rel Index  4.2   03/06/23  10:04    


 


Troponin I  <0.012 ng/mL (0.000-0.034)   03/06/23  21:39    


 


Total Protein  4.9 g/dL (6.2-8.2)  L  03/08/23  07:12    


 


Albumin  3.0 g/dL (3.8-4.9)  L  03/08/23  07:12    


 


Globulin  1.9 g/dL (1.6-3.3)   03/08/23  07:12    


 


Albumin/Globulin Ratio  1.58 g/dL (1.60-3.17)  L  03/08/23  07:12    


 


Urine Color  Yellow   03/06/23  14:43    


 


Urine Appearance  Clear  (Clear)   03/06/23  14:43    


 


Urine pH  6.0  (5.0-8.0)   03/06/23  14:43    


 


Ur Specific Gravity  1.022  (1.001-1.035)   03/06/23  14:43    


 


Urine Protein  1+  (Negative)  H  03/06/23  14:43    


 


Urine Glucose (UA)  Negative  (Negative)   03/06/23  14:43    


 


Urine Ketones  Negative  (Negative)   03/06/23  14:43    


 


Urine Blood  Negative  (Negative)   03/06/23  14:43    


 


Urine Nitrite  Negative  (Negative)   03/06/23  14:43    


 


Urine Bilirubin  Negative  (Negative)   03/06/23  14:43    


 


Urine Urobilinogen  2.0 mg/dL (<2.0)   03/06/23  14:43    


 


Ur Leukocyte Esterase  Negative  (Negative)   03/06/23  14:43    


 


Urine RBC  <1 /hpf (0-5)   03/06/23  14:43    


 


Urine WBC  1 /hpf (0-5)   03/06/23  14:43    


 


Ur Squamous Epith Cells  <1 /hpf (0-4)   03/06/23  14:43    


 


Hyaline Casts  26 /lpf (0-2)  H  03/06/23  14:43    


 


Urine Mucus  Rare /hpf (None)  H  03/06/23  14:43    


 


Lithium  0.8 mmol/L  03/06/23  15:52    


 


Blood Type  O Positive   03/06/23  17:04    


 


Blood Type Confirm  O Positive   03/06/23  10:04    


 


Blood Type Recheck  No Previous Record   03/06/23  17:04    


 


Bld Type Recheck Status  CABO Indicated   03/06/23  17:04    


 


Antibody Screen  NEGATIVE   03/06/23  17:04    


 


Spec Expiration Date  03/09/2023 - 2304 03/06/23  17:04    














IMPRESSIONS: 


Small bowel obstruction


Schizoaffective disorder, depressive type


Obsessive-compulsive disorder


Alcohol use disorder, in sustained remission





PLAN: 


-Continue your medical and surgical management





-At this time patient DOES NOT meet criteria for inpatient psychiatric 

admission.  The patient is currently not presenting with any acute symptoms of 

psychosis or rigoberto.  He is not endorsing any suicidal or homicidal ideation, 

intention, and/or plan.  He is future and goal oriented.  He displays excellent 

insight and judgment.


-Patient properly safety plans with this provider and acknowledges that he would

follow-up with his outpatient [facility] team or return to the hospital should his 

psychiatric symptoms worsen.





-Would recommend the following medication changes/additions: 


Agree with holding Clomipramine and Glycopyrrolate due to anticholinergic 

effects. The patient has tried and failed numerous antipsychotic medications and

is currently on a regimen of Clozaril.  Clozaril is a strongly anticholinergic 

medication however the benefits of the medication appear necessary and 

continuing this medication is recommended at this time.  Clompiramine is 

currently utilized to manage OCD.  To replace clompiramine we will start Cital

opram (least anticholinergic) antidepressant. Concern for discontinuation 

syndrome from abrupt discontinuation of TCA antidepressant as well. 

Glycopyyrolate is used for management of sialorrhea secondary to Clozaril. It is

not pertinent to controlling psychiatric symptoms and would not need replacement

at this time.  


Agree to continue Lamictal and Lithium.





-Patient does not require 1:1 sitter for safety





-Patient is recommended to follow up with his [facility] team in the outpatient 

setting. He is cleared psychiatrically for discharge.





-Psychiatry will follow loosely, please contact with any questions. 








03/08/23 13:56

## 2023-03-09 LAB
ALBUMIN SERPL-MCNC: 2.8 G/DL (ref 3.8–4.9)
ALBUMIN/GLOB SERPL: 1.56 G/DL (ref 1.6–3.17)
ALP SERPL-CCNC: 53 U/L (ref 41–126)
ALT SERPL-CCNC: 10 U/L (ref 10–49)
ANION GAP SERPL CALC-SCNC: 3.1 MMOL/L (ref 10–18)
AST SERPL-CCNC: 15 U/L (ref 14–35)
BUN SERPL-SCNC: 17.2 MG/DL (ref 9–27)
BUN/CREAT SERPL: 17.2 RATIO (ref 12–20)
CALCIUM SPEC-MCNC: 8.4 MG/DL (ref 8.7–10.3)
CHLORIDE SERPL-SCNC: 113 MMOL/L (ref 96–109)
CO2 SERPL-SCNC: 25.9 MMOL/L (ref 20–27.5)
ERYTHROCYTE [DISTWIDTH] IN BLOOD BY AUTOMATED COUNT: 2.98 X 10*6/UL (ref 4.4–5.6)
ERYTHROCYTE [DISTWIDTH] IN BLOOD: 14.4 % (ref 11.5–14.5)
GLOBULIN SER CALC-MCNC: 1.8 G/DL (ref 1.6–3.3)
GLUCOSE BLD-MCNC: 140 MG/DL (ref 70–110)
GLUCOSE BLD-MCNC: 141 MG/DL (ref 70–110)
GLUCOSE BLD-MCNC: 169 MG/DL (ref 70–110)
GLUCOSE BLD-MCNC: 180 MG/DL (ref 70–110)
GLUCOSE BLD-MCNC: 190 MG/DL (ref 70–110)
GLUCOSE SERPL-MCNC: 113 MG/DL (ref 70–110)
HCT VFR BLD AUTO: 27.4 % (ref 39.6–50)
HGB BLD-MCNC: 8.6 G/DL (ref 13–17)
MAGNESIUM SPEC-SCNC: 2.3 MG/DL (ref 1.5–2.4)
MCH RBC QN AUTO: 28.9 PG (ref 27–32)
MCHC RBC AUTO-ENTMCNC: 31.4 G/DL (ref 32–37)
MCV RBC AUTO: 91.9 FL (ref 80–97)
NRBC BLD AUTO-RTO: 0 /100 WBCS (ref 0–0)
PLATELET # BLD AUTO: 217 X 10*3/UL (ref 140–440)
POTASSIUM SERPL-SCNC: 3.8 MMOL/L (ref 3.5–5.5)
PROT SERPL-MCNC: 4.6 G/DL (ref 6.2–8.2)
SODIUM SERPL-SCNC: 142 MMOL/L (ref 135–145)
WBC # BLD AUTO: 8.57 X 10*3/UL (ref 4.5–10)

## 2023-03-09 RX ADMIN — HYDROMORPHONE HYDROCHLORIDE PRN MG: 1 INJECTION, SOLUTION INTRAMUSCULAR; INTRAVENOUS; SUBCUTANEOUS at 21:15

## 2023-03-09 RX ADMIN — METRONIDAZOLE SCH MLS/HR: 500 INJECTION, SOLUTION INTRAVENOUS at 00:33

## 2023-03-09 RX ADMIN — ACETAMINOPHEN SCH MLS/HR: 10 INJECTION, SOLUTION INTRAVENOUS at 21:10

## 2023-03-09 RX ADMIN — PROPRANOLOL HYDROCHLORIDE SCH MG: 80 CAPSULE, EXTENDED RELEASE ORAL at 08:52

## 2023-03-09 RX ADMIN — CITALOPRAM HYDROBROMIDE SCH MG: 10 TABLET ORAL at 08:53

## 2023-03-09 RX ADMIN — ACETAMINOPHEN SCH MLS/HR: 10 INJECTION, SOLUTION INTRAVENOUS at 00:07

## 2023-03-09 RX ADMIN — LEVOTHYROXINE SODIUM SCH MCG: 50 TABLET ORAL at 05:37

## 2023-03-09 RX ADMIN — CEFEPIME HYDROCHLORIDE SCH MLS/HR: 2 INJECTION, POWDER, FOR SOLUTION INTRAVENOUS at 00:08

## 2023-03-09 RX ADMIN — LITHIUM CARBONATE SCH MG: 300 CAPSULE, GELATIN COATED ORAL at 21:12

## 2023-03-09 RX ADMIN — ACETAMINOPHEN SCH MLS/HR: 10 INJECTION, SOLUTION INTRAVENOUS at 16:33

## 2023-03-09 RX ADMIN — METRONIDAZOLE SCH MLS/HR: 500 INJECTION, SOLUTION INTRAVENOUS at 15:17

## 2023-03-09 RX ADMIN — INSULIN ASPART SCH UNIT: 100 INJECTION, SOLUTION INTRAVENOUS; SUBCUTANEOUS at 21:11

## 2023-03-09 RX ADMIN — INSULIN ASPART SCH: 100 INJECTION, SOLUTION INTRAVENOUS; SUBCUTANEOUS at 18:04

## 2023-03-09 RX ADMIN — CEFEPIME HYDROCHLORIDE SCH MLS/HR: 2 INJECTION, POWDER, FOR SOLUTION INTRAVENOUS at 09:00

## 2023-03-09 RX ADMIN — TAMSULOSIN HYDROCHLORIDE SCH MG: 0.4 CAPSULE ORAL at 18:09

## 2023-03-09 RX ADMIN — METRONIDAZOLE SCH MLS/HR: 500 INJECTION, SOLUTION INTRAVENOUS at 08:51

## 2023-03-09 RX ADMIN — SODIUM BICARBONATE SCH MLS/HR: 84 INJECTION, SOLUTION INTRAVENOUS at 03:59

## 2023-03-09 RX ADMIN — PANTOPRAZOLE SODIUM SCH MG: 40 INJECTION, POWDER, FOR SOLUTION INTRAVENOUS at 08:52

## 2023-03-09 RX ADMIN — ACETAMINOPHEN SCH MLS/HR: 10 INJECTION, SOLUTION INTRAVENOUS at 05:37

## 2023-03-09 RX ADMIN — PANTOPRAZOLE SODIUM SCH MG: 40 INJECTION, POWDER, FOR SOLUTION INTRAVENOUS at 21:12

## 2023-03-09 RX ADMIN — HEPARIN SODIUM SCH UNIT: 5000 INJECTION INTRAVENOUS; SUBCUTANEOUS at 08:52

## 2023-03-09 RX ADMIN — SODIUM BICARBONATE SCH: 84 INJECTION, SOLUTION INTRAVENOUS at 00:14

## 2023-03-09 RX ADMIN — HEPARIN SODIUM SCH UNIT: 5000 INJECTION INTRAVENOUS; SUBCUTANEOUS at 21:11

## 2023-03-09 RX ADMIN — POTASSIUM CHLORIDE SCH: 14.9 INJECTION, SOLUTION INTRAVENOUS at 15:19

## 2023-03-09 RX ADMIN — ATORVASTATIN CALCIUM SCH MG: 10 TABLET, FILM COATED ORAL at 21:12

## 2023-03-09 RX ADMIN — HYDROMORPHONE HYDROCHLORIDE PRN MG: 1 INJECTION, SOLUTION INTRAMUSCULAR; INTRAVENOUS; SUBCUTANEOUS at 08:52

## 2023-03-09 RX ADMIN — SODIUM BICARBONATE SCH MLS/HR: 84 INJECTION, SOLUTION INTRAVENOUS at 15:18

## 2023-03-09 RX ADMIN — CEFEPIME HYDROCHLORIDE SCH MLS/HR: 2 INJECTION, POWDER, FOR SOLUTION INTRAVENOUS at 15:18

## 2023-03-09 NOTE — P.PN
Subjective


Progress Note Date: 03/09/23





Hospital course:





Patient is a very pleasant 57-year-old male with a past medical history of type 

II non-insulin-dependent diabetes mellitus, hypertension, hyperlipidemia, GERD, 

schizoaffective disorder, anxiety, bipolar, depression, and previous inguinal 

hernia with repair.  Presented to the emergency department with a chief 

complaint of abdominal/epigastric/chest pain, nausea, vomiting, abdominal 

distention and constipation 2 weeks.  Patient reports vomiting dark black 

coffee-colored emesis and inability to have a bowel movement 2 weeks.  He 

reports pain institute diffuse abdomen radiating up into his epigastric region 

and chest.  He denies having any fevers, chills, palpitations, shortness of 

breath, cough or congestion, hemoptysis, melena, or hematochezia.  Patient 

reports decreased appetite and inability to hold down oral intake.  He does 

report decreased urinary output, but denies any retention or difficulties 

initiating a stream.  Reports concerns of dehydration.  Patient underwent full 

evaluation in the emergency department.  Labs completed and reviewed.  He was 

found to have leukocytosis with WBC count of 18.1 and normocytic anemia with 

hemoglobin of 12.6.  Coagulation profile normal findings.  BMP revealing 

hyponatremia with sodium 132 and hypochloremia with chloride 95 and acute kidney

injury with BUN 59, creatinine 2.11, and GFR of 34 as well as hyperglycemia with

glucose of 252.  Troponin less than 0.012.  EKG showing sinus tachycardia at 102

bpm with T-wave inversion in lateral leads I and aVL, T-wave inversion is new 

finding when compared to previous EKG completed 2/3/20.  Chest x-ray completed 

and report reviewed stating limited inspiration with bilateral consolidation and

right basilar atelectasis or infiltrate.  CT abdomen and pelvis without contrast

completed and radiology report reviewed showing dilated small bowel, 

cecum/ascending colon with transition point within the proximal third of the 

transverse colon with focal narrowing in none distention of the remainder of the

colon, findings possibly representing stricture versus mass, small bowel 

dilation with small bowel feces throughout suggesting delayed transit/fecal 

stasis and dilated stomach and esophagus filled with ingested contents.  

Discussed these  findings with the ED physicianin detail and patient was 

accepted for admission to general surgery unit with telemetry under our services

with consultation to general surgery.  Patient taken for colectomy on 3/7/23.





Physical exam:





Patient seen and fully evaluated at bedside this morning.  He is postoperative 

day 2.  NG tube was discontinued and patient ambulating in the [facility] with 

physical therapy.  He is tolerating a clear liquid diet and denies having any 

episodes of nausea or vomiting but also continues to deny any flatus or bowel 

movements.  Patient reports mild to moderate postoperative pain and remains on 

scheduled Ofirmev and PRN dilaudid.





Vital signs reviewed and stable. 


General: Nontoxic, no distress and appears stated age.  


Derm: Skin warm and dry, normal coloration for ethnicity.


Head: Atraumatic, normocephalic and symmetric.  


Eyes: EOMs intact, no lid lag, and anicteric sclera


Mouth: no lip lesions, mucus membranes moist


Cardiovascular: regular rate and rhythm with normal S1S2, no murmur, positive 

posterior tibial pulses bilaterally, and cap refill < 2 seconds.  


Lungs: Respirations even, regular, and unlabored on room air. Lungs CTA 

bilaterally, no rhonchi, no rales, no wheezing, and no accessory muscle usage. 


Abdominal: Abdomen taught pal distended with diffuse tenderness upon pation, no 

guarding, no appreciable organomegaly


Ext: ROM intact. No gross muscle atrophy, no edema, no contractures


Neuro: Speech clear, face symmetrical and CN II-XII grossly intact with no noted

focal neuro deficits


Psych: Alert and oriented to person, place, time, and situation. Appropriate and

pleasant affect. 





Assessment and Plan of Care:





Hyperchloremic hypernatremia, improving


-Reviewed morning labs.  BMP revealing resolution of hypernatremia with sodium 

decreasing from 146 down to 142 and chloride improving from 116 down to 113.


-0.45% NS decreased down to 75 mL's per hour from previous 125 mL per hour. 


-Order placed for repeat morning BMP to follow up on sodium and chloride results

and additional changes to infusion rate/fluid to be made based upon these 

findings.





Small bowel obstruction, status post colectomy on 3/7/23, postoperative day 2


Normocytic anemia, stable


-Labs completed and reviewed.  CBC showing stable normocytic anemia with 

hemoglobin of 8.6.  No need for transfusion at this time.


-Discussed with general surgery PA and patient to remain on clear liquid diet at

this time.


-Continue with Protonix 40 mg IVP twice daily for GI prophylaxis at this time.


-Order placed for repeat morning CBC to follow up on normocytic anemia to 

continue to monitor for improvement.





EKG changes


-EKG showing sinus tachycardia at 102 bpm with T-wave inversion in lateral leads

I and aVL, T-wave inversion is new finding when compared to previous EKG 

completed 2/3/20 upon personal review and interpretation of both.


-Heart score 3. with risk of MACE 0.9-1.7%.  Troponins were trended overnight 

all resulting at less than 0.0123 draws.  Patient asymptomatic at this time.  

Therefore recommending no need for further cardiac testing or evaluation at this

time.





Diabetes mellitus with hyperglycemia


-Continue glycemic protocol with NovoLog sliding scale every 4 hours while 

patient remains nothing by mouth.  Fasting blood glucose this morning 113.





Schizoaffective disorder


Anxiety and depression


Obsessive compulsive disorder


Bipolar disorder


-Continue patient's home medication regimen with Lamictal, Clozaril and lithium.




-Discontinued Glycopyrrolate and clomipramine.


-Psychiatry following, documentation reviewed, psychiatrist recommending 

starting patient on citalopram 10 mg daily to replace clomipramine and no need 

to replace Glycopyrrolate


-Lithium level therapeutic at 0.8.





Resolved:


Abdominal pain with intractable nausea and vomiting.  Secondary to small bowel 

obstruction.  Resolved.


Acute kidney injury, secondary to dehydration.  Resolved.


Dehydration secondary to intractable nausea and vomiting. Resolved.


Leukocytosis, Resolved.


Hyponatremia, resolved


Hypochloremia, resolved





CODE STATUS: Full code


DVT prophylaxis: SCDs


Discussed with: Patient, RN, and general surgery PA.


Anticipated discharge date: Clinical course to determine


Anticipated discharge place: Home





Patient was seen independently by nurse practitioner.





This document was prepared using Dragon dictation software.  Please allow for 

errors in transcription while rare they do occur.





Jb Blackmon NP rendered care for this patient independently, reviewed the 

findings and plan as documented in the note above.  I did not physically speak 

with or examine the patient on this date. 





Objective





- Vital Signs


Vital signs: 


                                   Vital Signs











Temp  97.6 F   03/09/23 08:00


 


Pulse  107 H  03/09/23 08:00


 


Resp  16   03/09/23 08:00


 


BP  118/83   03/09/23 08:00


 


Pulse Ox  97   03/09/23 08:00


 


FiO2      








                                 Intake & Output











 03/08/23 03/09/23 03/09/23





 18:59 06:59 18:59


 


Intake Total 2200  


 


Output Total 950 800 


 


Balance 1250 -800 


 


Intake:   


 


    


 


    Cefepime 2 gm In Sodium 100  





    Chloride 0.9% 100 ml @ 25   





    mls/hr IVPB Q8HR [facility] Rx#   





    :[id number]   


 


    metroNIDAZOLE-NS  200  





    mg In Saline 1 100ml.bag   





    @ 100 mls/hr IVPB Q8HR   





    [facility] Rx#:[id number]   


 


  Intake, IV Titration 1900  





  Amount   


 


    ACETAMINOPHEN IV (For   





    ) 1,000 mg In Empty Bag 1   





    bag @ 400 mls/hr IVPB   





    Q6HR [facility] Rx#:[id number]   


 


    Sodium Chloride 0.45% 1, 700  





    000 ml @ 125 mls/hr IV .   





    Q8H [facility] Rx#:[id number]   


 


    Sodium Chloride 0.9% 1, 1000  





    000 ml @ 999 mls/hr IV .   





    Q1H1M [facility] Rx#:[id number]   


 


Output:   


 


  Gastric Drainage 150  


 


  Urine 800 800 


 


Other:   


 


  Voiding Method Indwelling Catheter Indwelling Catheter 














- Labs


CBC & Chem 7: 


                                 03/10/23 07:28





                                 03/10/23 07:28


Labs: 


                  Abnormal Lab Results - Last 24 Hours (Table)











  03/08/23 03/08/23 03/08/23 Range/Units





  07:12 13:12 17:33 


 


RBC     (4.40-5.60)  X 10*6/uL


 


Hgb     (13.0-17.0)  g/dL


 


Hct     (39.6-50.0)  %


 


MCHC     (32.0-37.0)  g/dL


 


Sodium  146 H    (135-145)  mmol/L


 


Chloride  116 H    ()  mmol/L


 


Anion Gap  9.60 L    (10.00-18.00)  mmol/L


 


BUN/Creatinine Ratio  20.73 H    (12.00-20.00)  Ratio


 


Glucose  149 H    ()  mg/dL


 


POC Glucose (mg/dL)   175 H  159 H  ()  mg/dL


 


Calcium  8.4 L    (8.7-10.3)  mg/dL


 


Total Bilirubin  0.20 L    (0.30-1.20)  mg/dL


 


Total Protein  4.9 L    (6.2-8.2)  g/dL


 


Albumin  3.0 L    (3.8-4.9)  g/dL


 


Albumin/Globulin Ratio  1.58 L    (1.60-3.17)  g/dL














  03/08/23 03/09/23 03/09/23 Range/Units





  21:12 04:51 04:51 


 


RBC   2.98 L   (4.40-5.60)  X 10*6/uL


 


Hgb   8.6 L   (13.0-17.0)  g/dL


 


Hct   27.4 L   (39.6-50.0)  %


 


MCHC   31.4 L   (32.0-37.0)  g/dL


 


Sodium     (135-145)  mmol/L


 


Chloride    113 H  ()  mmol/L


 


Anion Gap    3.10 L  (10.00-18.00)  mmol/L


 


BUN/Creatinine Ratio     (12.00-20.00)  Ratio


 


Glucose    113 H  ()  mg/dL


 


POC Glucose (mg/dL)  221 H    ()  mg/dL


 


Calcium    8.4 L  (8.7-10.3)  mg/dL


 


Total Bilirubin    0.20 L  (0.30-1.20)  mg/dL


 


Total Protein    4.6 L  (6.2-8.2)  g/dL


 


Albumin    2.8 L  (3.8-4.9)  g/dL


 


Albumin/Globulin Ratio    1.56 L  (1.60-3.17)  g/dL














  03/09/23 03/09/23 Range/Units





  05:46 07:26 


 


RBC    (4.40-5.60)  X 10*6/uL


 


Hgb    (13.0-17.0)  g/dL


 


Hct    (39.6-50.0)  %


 


MCHC    (32.0-37.0)  g/dL


 


Sodium    (135-145)  mmol/L


 


Chloride    ()  mmol/L


 


Anion Gap    (10.00-18.00)  mmol/L


 


BUN/Creatinine Ratio    (12.00-20.00)  Ratio


 


Glucose    ()  mg/dL


 


POC Glucose (mg/dL)  140 H  190 H  ()  mg/dL


 


Calcium    (8.7-10.3)  mg/dL


 


Total Bilirubin    (0.30-1.20)  mg/dL


 


Total Protein    (6.2-8.2)  g/dL


 


Albumin    (3.8-4.9)  g/dL


 


Albumin/Globulin Ratio    (1.60-3.17)  g/dL

## 2023-03-09 NOTE — P.PN
Subjective


Progress Note Date: 03/09/23





CHIEF COMPLAINT: Colonic obstruction





HISTORY OF PRESENT ILLNESS: Patient is postop day #2 status post right colectomy

and partial omentectomy for possible stricture of the transverse colon and 

massive dilated proximal transverse right colon and small bowel.  Patient does 

complain of pain in the incision site.  NG tube was discontinued.  He has been 

started on clear liquid diet.  Joaquin catheter removed.  He denies any bowel 

activity.  Denies any nausea or vomiting.  Afebrile.  Patient has had mild tac

hycardia.  Heart rate this afternoon his improved at 76.  WBC is 8.57 Hgb 8.6 

platelets 217 sounds 142 potassium 3.8 creatinine 1.0





PHYSICAL EXAM: 


VITAL SIGNS: Reviewed.


GENERAL: Well-developed in no acute distress. 


HEENT:  No sclera icterus. Extraocular movements grossly intact.  Moist buccal 

mucosa. Head is atraumatic, normocephalic. 


ABDOMEN:  Softer than yesterday.  Distended.  Prevana wound VAC in place and is 

clean, dry and intact


NEUROLOGIC: Alert and oriented. Cranial nerves II through XII grossly intact.





ASSESSMENT: 


1.  Massively dilated proximal transverse right colon and small bowel with 

possible stricture of the transverse colon status post right colectomy and 

partial omentectomy








PLAN: 


-Continue clear liquid diet


-Encouraged patient to increase Activity level


-Restart IV Tylenol for pain control


-Continue IV fluids


-Encouraged patient to use incentive spirometer


-GI prophylaxis Protonix and DVT prophylaxis subcu heparin





Physician Assistant note has been reviewed by physician. Signing provider agrees

with the documented findings, assessment, and plan of care. 





Objective





- Vital Signs


Vital signs: 


                                   Vital Signs











Temp  97.6 F   03/09/23 13:58


 


Pulse  76   03/09/23 13:58


 


Resp  16   03/09/23 13:58


 


BP  100/69   03/09/23 13:58


 


Pulse Ox  98   03/09/23 13:58


 


FiO2      








                                 Intake & Output











 03/08/23 03/09/23 03/09/23





 18:59 06:59 18:59


 


Intake Total 2200  321


 


Output Total 950 800 


 


Balance 1250 -800 321


 


Intake:   


 


    


 


    Cefepime 2 gm In Sodium 100  





    Chloride 0.9% 100 ml @ 25   





    mls/hr IVPB Q8HR [facility] Rx#   





    :[id number]   


 


    metroNIDAZOLE-NS  200  





    mg In Saline 1 100ml.bag   





    @ 100 mls/hr IVPB Q8HR   





    [facility] Rx#:[id number]   


 


  Intake, IV Titration 1900  





  Amount   


 


    ACETAMINOPHEN IV (For   





    ) 1,000 mg In Empty Bag 1   





    bag @ 400 mls/hr IVPB   





    Q6HR [facility] Rx#:[id number]   


 


    Sodium Chloride 0.45% 1, 700  





    000 ml @ 125 mls/hr IV .   





    Q8H [facility] Rx#:[id number]   


 


    Sodium Chloride 0.9% 1, 1000  





    000 ml @ 999 mls/hr IV .   





    Q1H1M [facility] Rx#:[id number]   


 


  Oral   321


 


Output:   


 


  Gastric Drainage 150  


 


  Urine 800 800 


 


Other:   


 


  Voiding Method Indwelling Catheter Indwelling Catheter 














- Labs


CBC & Chem 7: 


                                 03/09/23 04:51





                                 03/09/23 04:51


Labs: 


                  Abnormal Lab Results - Last 24 Hours (Table)











  03/08/23 03/08/23 03/09/23 Range/Units





  17:33 21:12 04:51 


 


RBC    2.98 L  (4.40-5.60)  X 10*6/uL


 


Hgb    8.6 L  (13.0-17.0)  g/dL


 


Hct    27.4 L  (39.6-50.0)  %


 


MCHC    31.4 L  (32.0-37.0)  g/dL


 


Chloride     ()  mmol/L


 


Anion Gap     (10.00-18.00)  mmol/L


 


Glucose     ()  mg/dL


 


POC Glucose (mg/dL)  159 H  221 H   ()  mg/dL


 


Calcium     (8.7-10.3)  mg/dL


 


Total Bilirubin     (0.30-1.20)  mg/dL


 


Total Protein     (6.2-8.2)  g/dL


 


Albumin     (3.8-4.9)  g/dL


 


Albumin/Globulin Ratio     (1.60-3.17)  g/dL














  03/09/23 03/09/23 03/09/23 Range/Units





  04:51 05:46 07:26 


 


RBC     (4.40-5.60)  X 10*6/uL


 


Hgb     (13.0-17.0)  g/dL


 


Hct     (39.6-50.0)  %


 


MCHC     (32.0-37.0)  g/dL


 


Chloride  113 H    ()  mmol/L


 


Anion Gap  3.10 L    (10.00-18.00)  mmol/L


 


Glucose  113 H    ()  mg/dL


 


POC Glucose (mg/dL)   140 H  190 H  ()  mg/dL


 


Calcium  8.4 L    (8.7-10.3)  mg/dL


 


Total Bilirubin  0.20 L    (0.30-1.20)  mg/dL


 


Total Protein  4.6 L    (6.2-8.2)  g/dL


 


Albumin  2.8 L    (3.8-4.9)  g/dL


 


Albumin/Globulin Ratio  1.56 L    (1.60-3.17)  g/dL














  03/09/23 Range/Units





  12:38 


 


RBC   (4.40-5.60)  X 10*6/uL


 


Hgb   (13.0-17.0)  g/dL


 


Hct   (39.6-50.0)  %


 


MCHC   (32.0-37.0)  g/dL


 


Chloride   ()  mmol/L


 


Anion Gap   (10.00-18.00)  mmol/L


 


Glucose   ()  mg/dL


 


POC Glucose (mg/dL)  169 H  ()  mg/dL


 


Calcium   (8.7-10.3)  mg/dL


 


Total Bilirubin   (0.30-1.20)  mg/dL


 


Total Protein   (6.2-8.2)  g/dL


 


Albumin   (3.8-4.9)  g/dL


 


Albumin/Globulin Ratio   (1.60-3.17)  g/dL

## 2023-03-10 LAB
ALBUMIN SERPL-MCNC: 2.4 G/DL (ref 3.5–5)
ALBUMIN/GLOB SERPL: 1.1 {RATIO}
ALP SERPL-CCNC: 55 U/L (ref 38–126)
ALT SERPL-CCNC: 13 U/L (ref 4–49)
ANION GAP SERPL CALC-SCNC: 3 MMOL/L
AST SERPL-CCNC: 18 U/L (ref 17–59)
BUN SERPL-SCNC: 14 MG/DL (ref 9–20)
CALCIUM SPEC-MCNC: 7.9 MG/DL (ref 8.4–10.2)
CHLORIDE SERPL-SCNC: 111 MMOL/L (ref 98–107)
CO2 SERPL-SCNC: 23 MMOL/L (ref 22–30)
ERYTHROCYTE [DISTWIDTH] IN BLOOD BY AUTOMATED COUNT: 3 X 10*6/UL (ref 4.4–5.6)
ERYTHROCYTE [DISTWIDTH] IN BLOOD: 14.4 % (ref 11.5–14.5)
GLOBULIN SER CALC-MCNC: 2.1 G/DL
GLUCOSE BLD-MCNC: 100 MG/DL (ref 70–110)
GLUCOSE BLD-MCNC: 103 MG/DL (ref 70–110)
GLUCOSE BLD-MCNC: 104 MG/DL (ref 70–110)
GLUCOSE BLD-MCNC: 148 MG/DL (ref 70–110)
GLUCOSE SERPL-MCNC: 91 MG/DL (ref 74–99)
HCT VFR BLD AUTO: 27.5 % (ref 39.6–50)
HGB BLD-MCNC: 8.5 G/DL (ref 13–17)
MAGNESIUM SPEC-SCNC: 2.3 MG/DL (ref 1.6–2.3)
MCH RBC QN AUTO: 28.3 PG (ref 27–32)
MCHC RBC AUTO-ENTMCNC: 30.9 G/DL (ref 32–37)
MCV RBC AUTO: 91.7 FL (ref 80–97)
NRBC BLD AUTO-RTO: 0 /100 WBCS (ref 0–0)
PLATELET # BLD AUTO: 244 X 10*3/UL (ref 140–440)
POTASSIUM SERPL-SCNC: 3.5 MMOL/L (ref 3.5–5.1)
PROT SERPL-MCNC: 4.5 G/DL (ref 6.3–8.2)
SODIUM SERPL-SCNC: 137 MMOL/L (ref 137–145)
WBC # BLD AUTO: 9.77 X 10*3/UL (ref 4.5–10)

## 2023-03-10 RX ADMIN — METRONIDAZOLE SCH MLS/HR: 500 INJECTION, SOLUTION INTRAVENOUS at 08:37

## 2023-03-10 RX ADMIN — HEPARIN SODIUM SCH UNIT: 5000 INJECTION INTRAVENOUS; SUBCUTANEOUS at 20:30

## 2023-03-10 RX ADMIN — INSULIN ASPART SCH: 100 INJECTION, SOLUTION INTRAVENOUS; SUBCUTANEOUS at 17:31

## 2023-03-10 RX ADMIN — SODIUM BICARBONATE SCH: 84 INJECTION, SOLUTION INTRAVENOUS at 02:18

## 2023-03-10 RX ADMIN — CEFEPIME HYDROCHLORIDE SCH MLS/HR: 2 INJECTION, POWDER, FOR SOLUTION INTRAVENOUS at 00:31

## 2023-03-10 RX ADMIN — ACETAMINOPHEN SCH MLS/HR: 10 INJECTION, SOLUTION INTRAVENOUS at 03:34

## 2023-03-10 RX ADMIN — PANTOPRAZOLE SODIUM SCH MG: 40 INJECTION, POWDER, FOR SOLUTION INTRAVENOUS at 08:16

## 2023-03-10 RX ADMIN — INSULIN ASPART SCH: 100 INJECTION, SOLUTION INTRAVENOUS; SUBCUTANEOUS at 07:47

## 2023-03-10 RX ADMIN — ATORVASTATIN CALCIUM SCH MG: 10 TABLET, FILM COATED ORAL at 20:28

## 2023-03-10 RX ADMIN — ACETAMINOPHEN SCH MLS/HR: 10 INJECTION, SOLUTION INTRAVENOUS at 08:16

## 2023-03-10 RX ADMIN — METRONIDAZOLE SCH MLS/HR: 500 INJECTION, SOLUTION INTRAVENOUS at 15:56

## 2023-03-10 RX ADMIN — METRONIDAZOLE SCH MLS/HR: 500 INJECTION, SOLUTION INTRAVENOUS at 00:32

## 2023-03-10 RX ADMIN — PROPRANOLOL HYDROCHLORIDE SCH MG: 80 CAPSULE, EXTENDED RELEASE ORAL at 08:17

## 2023-03-10 RX ADMIN — POTASSIUM CHLORIDE SCH: 14.9 INJECTION, SOLUTION INTRAVENOUS at 15:28

## 2023-03-10 RX ADMIN — TAMSULOSIN HYDROCHLORIDE SCH MG: 0.4 CAPSULE ORAL at 08:40

## 2023-03-10 RX ADMIN — CITALOPRAM HYDROBROMIDE SCH MG: 10 TABLET ORAL at 08:15

## 2023-03-10 RX ADMIN — CEFEPIME HYDROCHLORIDE SCH MLS/HR: 2 INJECTION, POWDER, FOR SOLUTION INTRAVENOUS at 08:37

## 2023-03-10 RX ADMIN — HEPARIN SODIUM SCH UNIT: 5000 INJECTION INTRAVENOUS; SUBCUTANEOUS at 08:16

## 2023-03-10 RX ADMIN — DOCUSATE SODIUM SCH MG: 100 CAPSULE, LIQUID FILLED ORAL at 20:28

## 2023-03-10 RX ADMIN — INSULIN ASPART SCH: 100 INJECTION, SOLUTION INTRAVENOUS; SUBCUTANEOUS at 12:16

## 2023-03-10 RX ADMIN — CEFEPIME HYDROCHLORIDE SCH MLS/HR: 2 INJECTION, POWDER, FOR SOLUTION INTRAVENOUS at 15:56

## 2023-03-10 RX ADMIN — LEVOTHYROXINE SODIUM SCH MCG: 50 TABLET ORAL at 05:41

## 2023-03-10 RX ADMIN — LITHIUM CARBONATE SCH MG: 300 CAPSULE, GELATIN COATED ORAL at 20:29

## 2023-03-10 RX ADMIN — SODIUM BICARBONATE SCH MLS/HR: 84 INJECTION, SOLUTION INTRAVENOUS at 15:56

## 2023-03-10 RX ADMIN — PANTOPRAZOLE SODIUM SCH MG: 40 INJECTION, POWDER, FOR SOLUTION INTRAVENOUS at 20:30

## 2023-03-10 NOTE — P.PN
Subjective


Progress Note Date: 03/10/23





CHIEF COMPLAINT: Colonic obstruction





HISTORY OF PRESENT ILLNESS: Patient is postop day #3 status post right colectomy

and partial omentectomy for possible stricture of the transverse colon and 

massive dilated proximal transverse right colon and small bowel.  Patient 

complains of pain at incision site.  Denies any nausea or vomiting.  Still no 

bowel activity.  Afebrile.  Tachycardia has resolved.  WBC is 9.77 hemoglobin 

8.5 platelets 244 sons 137 potassium 3.5 creatinine 1.07





Patient seen and examined with Dr. Mcgowan





PHYSICAL EXAM: 


VITAL SIGNS: Reviewed.


GENERAL: Well-developed in no acute distress. 


HEENT:  No sclera icterus. Extraocular movements grossly intact.  Moist buccal 

mucosa. Head is atraumatic, normocephalic. 


ABDOMEN:  Soft.  Distended.  Tender at incision site.  Prevana wound VAC in 

place and is clean, dry and intact


NEUROLOGIC: Alert and oriented. Cranial nerves II through XII grossly intact.





ASSESSMENT: 


1.  Massively dilated proximal transverse right colon and small bowel with 

possible stricture of the transverse colon status post right colectomy and 

partial omentectomy








PLAN: 


-Downgrade diet nothing by mouth except for ice chips until bowel activity 

occurs


-Encouraged patient to increase Activity level


-Consult PT OT


-[place] added for po pain meds


-Add colace


-Continue IV fluids


-Encouraged patient to use incentive spirometer


-GI prophylaxis Protonix and DVT prophylaxis subcu heparin





Physician Assistant note has been reviewed by physician. Signing provider agrees

with the documented findings, assessment, and plan of care. 





Objective





- Vital Signs


Vital signs: 


                                   Vital Signs











Temp  97.9 F   03/10/23 07:20


 


Pulse  84   03/10/23 07:20


 


Resp  16   03/10/23 07:50


 


BP  112/72   03/10/23 07:20


 


Pulse Ox  93 L  03/10/23 07:50


 


FiO2      








                                 Intake & Output











 03/09/23 03/10/23 03/10/23





 18:59 06:59 18:59


 


Intake Total 321  


 


Output Total 350 1100 400


 


Balance -29 -1100 -400


 


Intake:   


 


  Oral 321  


 


Output:   


 


  Urine 350 1100 400


 


Other:   


 


  Voiding Method  Indwelling Catheter 














- Labs


CBC & Chem 7: 


                                 03/10/23 07:28





                                 03/10/23 07:28


Labs: 


                  Abnormal Lab Results - Last 24 Hours (Table)











  03/09/23 03/09/23 03/09/23 Range/Units





  12:38 17:20 20:13 


 


Chloride     ()  mmol/L


 


POC Glucose (mg/dL)  169 H  141 H  180 H  ()  mg/dL


 


Calcium     (8.4-10.2)  mg/dL


 


Total Protein     (6.3-8.2)  g/dL


 


Albumin     (3.5-5.0)  g/dL














  03/10/23 Range/Units





  07:28 


 


Chloride  111 H  ()  mmol/L


 


POC Glucose (mg/dL)   ()  mg/dL


 


Calcium  7.9 L  (8.4-10.2)  mg/dL


 


Total Protein  4.5 L  (6.3-8.2)  g/dL


 


Albumin  2.4 L  (3.5-5.0)  g/dL

## 2023-03-10 NOTE — P.PN
Subjective


Progress Note Date: 03/10/23





Hospital course:





Patient is a very pleasant 57-year-old male with a past medical history of type 

II non-insulin-dependent diabetes mellitus, hypertension, hyperlipidemia, GERD, 

schizoaffective disorder, anxiety, bipolar, depression, and previous inguinal 

hernia with repair.  Presented to the emergency department with a chief 

complaint of abdominal/epigastric/chest pain, nausea, vomiting, abdominal 

distention and constipation 2 weeks.  Patient reports vomiting dark black 

coffee-colored emesis and inability to have a bowel movement 2 weeks.  He 

reports pain institute diffuse abdomen radiating up into his epigastric region 

and chest.  He denies having any fevers, chills, palpitations, shortness of 

breath, cough or congestion, hemoptysis, melena, or hematochezia.  Patient 

reports decreased appetite and inability to hold down oral intake.  He does 

report decreased urinary output, but denies any retention or difficulties 

initiating a stream.  Reports concerns of dehydration.  Patient underwent full 

evaluation in the emergency department.  Labs completed and reviewed.  He was 

found to have leukocytosis with WBC count of 18.1 and normocytic anemia with 

hemoglobin of 12.6.  Coagulation profile normal findings.  BMP revealing 

hyponatremia with sodium 132 and hypochloremia with chloride 95 and acute kidney

injury with BUN 59, creatinine 2.11, and GFR of 34 as well as hyperglycemia with

glucose of 252.  Troponin less than 0.012.  EKG showing sinus tachycardia at 102

bpm with T-wave inversion in lateral leads I and aVL, T-wave inversion is new 

finding when compared to previous EKG completed 2/3/20.  Chest x-ray completed 

and report reviewed stating limited inspiration with bilateral consolidation and

right basilar atelectasis or infiltrate.  CT abdomen and pelvis without contrast

completed and radiology report reviewed showing dilated small bowel, 

cecum/ascending colon with transition point within the proximal third of the 

transverse colon with focal narrowing in none distention of the remainder of the

colon, findings possibly representing stricture versus mass, small bowel 

dilation with small bowel feces throughout suggesting delayed transit/fecal 

stasis and dilated stomach and esophagus filled with ingested contents.  

Discussed these  findings with the ED physicianin detail and patient was 

accepted for admission to general surgery unit with telemetry under our services

with consultation to general surgery.  Patient taken for colectomy on 3/7/23.





Physical exam:





Patient seen and fully evaluated at bedside this morning.  He is postoperative 

day 3.  Patient was sitting up in chair at bedside.  He continues to report mild

postoperative pain.  He denies having any nausea or vomiting.  Patient denies 

passing flatus or having bowel movement.  Discussed plan of care with general 

surgery PA stating they are going to downgrade patient from clear liquids back 

to ice chips only until bowel activity occurs.








Vital signs reviewed and stable. 


General: Nontoxic, no distress and appears stated age.  


Derm: Skin warm and dry, normal coloration for ethnicity.


Head: Atraumatic, normocephalic and symmetric.  


Eyes: EOMs intact, no lid lag, and anicteric sclera


Mouth: no lip lesions, mucus membranes moist


Cardiovascular: regular rate and rhythm with normal S1S2, no murmur, positive 

posterior tibial pulses bilaterally, and cap refill < 2 seconds.  


Lungs: Respirations even, regular, and unlabored on room air. Lungs CTA 

bilaterally, no rhonchi, no rales, no wheezing, and no accessory muscle usage. 


Abdominal: Abdomen soft and slightly distended, wound VAC in place.  Minimal 

bowel sounds noted.


Ext: ROM intact. No gross muscle atrophy, no edema, no contractures


Neuro: Speech clear, face symmetrical and CN II-XII grossly intact with no noted

focal neuro deficits


Psych: Alert and oriented to person, place, time, and situation. Appropriate and

pleasant affect. 





Assessment and Plan of Care:





Hyperchloremic hypernatremia, improving


-Reviewed morning labs.  BMP revealing resolution of hypernatremia with sodium 

decreasing from 146 down to 142 and chloride improving from 116 down to 113.


-0.45% NS decreased down to 75 mL's per hour from previous 125 mL per hour. 


-Order placed for repeat morning BMP to follow up on sodium and chloride results

and additional changes to infusion rate/fluid to be made based upon these 

findings.





Small bowel obstruction, status post colectomy on 3/7/23, postoperative day 3


Postoperative blood loss anemia, expected finding and stable


-Labs completed and reviewed.  CBC showing stable normocytic anemia with 

hemoglobin of 8.5.  No need for transfusion at this time.


I reviewed the documentation as provided by the KAMAR above, who is the original 

author of this note.  I agree with the documented assessment and plan, with the 

following changes: none


-Discussed plan of care with general surgery PA stating they are going to 

downgrade patient from clear liquids back to ice chips only until bowel activity

occurs.


-Continue with Protonix 40 mg IVP twice daily for GI prophylaxis at this time.


-Order placed for repeat morning CBC to follow up on normocytic anemia to 

continue to monitor for improvement.





EKG changes


-EKG showing sinus tachycardia at 102 bpm with T-wave inversion in lateral leads

I and aVL, T-wave inversion is new finding when compared to previous EKG 

completed 2/3/20 upon personal review and interpretation of both.


-Heart score 3. with risk of MACE 0.9-1.7%.  Troponins were trended overnight 

all resulting at less than 0.0123 draws.  Patient asymptomatic at this time.  

Therefore recommending no need for further cardiac testing or evaluation at this

time.





Diabetes mellitus with hyperglycemia


-Continue glycemic protocol with NovoLog sliding scale every 4 hours while 

patient remains nothing by mouth.  Fasting blood glucose this morning 113.





Schizoaffective disorder


Anxiety and depression


Obsessive compulsive disorder


Bipolar disorder


-Continue patient's home medication regimen with Lamictal, Clozaril and lithium.




-Discontinued Glycopyrrolate and clomipramine.


-Psychiatry following, documentation reviewed, psychiatrist placed patient on 

citalopram 10 mg daily to replace clomipramine and no need to replace 

Glycopyrrolate


-Lithium level therapeutic at 0.8.





Resolved:


Abdominal pain with intractable nausea and vomiting.  Secondary to small bowel 

obstruction.  Resolved.


Acute kidney injury, secondary to dehydration.  Resolved.


Dehydration secondary to intractable nausea and vomiting. Resolved.


Leukocytosis, Resolved.


Hyponatremia, resolved


Hypochloremia, resolved





CODE STATUS: Full code


DVT prophylaxis: SCDs


Discussed with: Patient, RN, and general surgery PA.


Anticipated discharge date: Clinical course to determine


Anticipated discharge place: Home





Patient was seen independently by nurse practitioner.





This document was prepared using Dragon dictation software.  Please allow for 

errors in transcription while rare they do occur.








I reviewed the documentation as provided by the KAMAR above, who is the original 

author of this note.  I agree with the documented assessment and plan, with the 

following changes: none





Objective





- Vital Signs


Vital signs: 


                                   Vital Signs











Temp  97.9 F   03/10/23 07:20


 


Pulse  84   03/10/23 07:20


 


Resp  16   03/10/23 07:50


 


BP  112/72   03/10/23 07:20


 


Pulse Ox  93 L  03/10/23 07:50


 


FiO2      








                                 Intake & Output











 03/09/23 03/10/23 03/10/23





 18:59 06:59 18:59


 


Intake Total 321  


 


Output Total 350 1100 400


 


Balance -29 -1100 -400


 


Intake:   


 


  Oral 321  


 


Output:   


 


  Urine 350 1100 400


 


Other:   


 


  Voiding Method  Indwelling Catheter 














- Labs


CBC & Chem 7: 


                                 03/10/23 07:28





                                 03/10/23 07:28


Labs: 


                  Abnormal Lab Results - Last 24 Hours (Table)











  03/09/23 03/09/23 03/09/23 Range/Units





  12:38 17:20 20:13 


 


Chloride     ()  mmol/L


 


POC Glucose (mg/dL)  169 H  141 H  180 H  ()  mg/dL


 


Calcium     (8.4-10.2)  mg/dL


 


Total Protein     (6.3-8.2)  g/dL


 


Albumin     (3.5-5.0)  g/dL














  03/10/23 Range/Units





  07:28 


 


Chloride  111 H  ()  mmol/L


 


POC Glucose (mg/dL)   ()  mg/dL


 


Calcium  7.9 L  (8.4-10.2)  mg/dL


 


Total Protein  4.5 L  (6.3-8.2)  g/dL


 


Albumin  2.4 L  (3.5-5.0)  g/dL

## 2023-03-10 NOTE — CDI
Documentation Clarification Form



Date: 03/10/2023

From: Sheridan Ying RN,CCDS

Phone:[phone] 

MRN: [id number]

Admit Date: 3/6/2023 1:14:00 PM

Patient Name: Ovi Vicente

Visit Number: [id number]

Discharge Date:  





ATTENTION: The Clinical Documentation Specialists (CDI) and [facility] Coding Staff 
appreciate your assistance in clarifying documentation. Please respond to the 
clarification below the line at the bottom and electronically sign. The CDI & 
[facility] Coding staff will review the response and follow-up if needed. Please note: 
Queries are made part of the Legal Health Record. If you have any questions, 
please contact the author of this message via ITS.



Dr. Avery Ovalle



Unspecified postoperative blood loss anemia is documented in the progress note 
on 03/10/23.  Additional specificity regarding the acuity of anemia is 
requested.





History/Risk Factors: Diabetes mellitus, hypertension, Hyperlipidemia, 
Schizoaffective disorder Inguinal hernia 



Clinical indicators: 57-year-old male with complaints of abdominal, epigastric, 
chest pain with nausea and vomiting. CT abdomen and pelvis report showing 
dilated small bowel, cecum/ascending colon with transition point within the 
proximal third of the transverse colon with focal narrowing nondistention of the
remainder of the colon. Small bowel dilation. Dilates stomach and esophagus 
flied with ingested contents.



3/7 Procedure: Right colectomy. Partial omentectomy



3/6 HGB 12.6 HCT 36.9

3/7 HGB 10.0 HCT 31.4

3/8 HGB 9.9   HCT 30.4

3/9 HGB 8.6 HCT 27.5 

3/10 HGB 8.5  HCT 27.5



Treatment:        

Monitor CBC Daily

Protonix 40 MG IVP BID



Please clarify the type and acuity of postoperative blood loss anemia:





[  ] Acute blood loss anemia, expected 

[  ] Unable to determine

[  ] Other, please specify ___________



(Template Last Revised: February 2021)

___________________________________________________________________________

Acute Postoperative blood loss anemia, expected finding and stable



Dictated By:  

Jb Blackmon



Signed By:  

<Electronically signed by Jb VARGAS>

03/12/23 1507

<Electronically signed by Tracy Rea MD>

03/13/23 0812

<Electronically signed by Tracy Rea MD>

MTDD

## 2023-03-11 LAB
ALBUMIN SERPL-MCNC: 2.5 G/DL (ref 3.5–5)
ALBUMIN/GLOB SERPL: 1 {RATIO}
ALP SERPL-CCNC: 58 U/L (ref 38–126)
ALT SERPL-CCNC: 15 U/L (ref 4–49)
ANION GAP SERPL CALC-SCNC: 9 MMOL/L
AST SERPL-CCNC: 25 U/L (ref 17–59)
BUN SERPL-SCNC: 11 MG/DL (ref 9–20)
CALCIUM SPEC-MCNC: 8.1 MG/DL (ref 8.4–10.2)
CHLORIDE SERPL-SCNC: 120 MMOL/L (ref 98–107)
CO2 SERPL-SCNC: 18 MMOL/L (ref 22–30)
ERYTHROCYTE [DISTWIDTH] IN BLOOD BY AUTOMATED COUNT: 3.21 M/UL (ref 4.3–5.9)
ERYTHROCYTE [DISTWIDTH] IN BLOOD: 14.3 % (ref 11.5–15.5)
GLOBULIN SER CALC-MCNC: 2.4 G/DL
GLUCOSE BLD-MCNC: 100 MG/DL (ref 70–110)
GLUCOSE BLD-MCNC: 114 MG/DL (ref 70–110)
GLUCOSE BLD-MCNC: 119 MG/DL (ref 70–110)
GLUCOSE BLD-MCNC: 92 MG/DL (ref 70–110)
GLUCOSE SERPL-MCNC: 95 MG/DL (ref 74–99)
HCT VFR BLD AUTO: 29.1 % (ref 39–53)
HGB BLD-MCNC: 9.3 GM/DL (ref 13–17.5)
MAGNESIUM SPEC-SCNC: 2.4 MG/DL (ref 1.6–2.3)
MCH RBC QN AUTO: 28.8 PG (ref 25–35)
MCHC RBC AUTO-ENTMCNC: 31.9 G/DL (ref 31–37)
MCV RBC AUTO: 90.4 FL (ref 80–100)
PLATELET # BLD AUTO: 282 K/UL (ref 150–450)
POTASSIUM SERPL-SCNC: 3.5 MMOL/L (ref 3.5–5.1)
PROT SERPL-MCNC: 4.9 G/DL (ref 6.3–8.2)
SODIUM SERPL-SCNC: 147 MMOL/L (ref 137–145)
WBC # BLD AUTO: 9.6 K/UL (ref 3.8–10.6)

## 2023-03-11 RX ADMIN — POTASSIUM CHLORIDE SCH MLS/HR: 14.9 INJECTION, SOLUTION INTRAVENOUS at 20:28

## 2023-03-11 RX ADMIN — CEFEPIME HYDROCHLORIDE SCH MLS/HR: 2 INJECTION, POWDER, FOR SOLUTION INTRAVENOUS at 00:18

## 2023-03-11 RX ADMIN — METRONIDAZOLE SCH MLS/HR: 500 INJECTION, SOLUTION INTRAVENOUS at 16:15

## 2023-03-11 RX ADMIN — PANTOPRAZOLE SODIUM SCH MG: 40 INJECTION, POWDER, FOR SOLUTION INTRAVENOUS at 20:20

## 2023-03-11 RX ADMIN — DOCUSATE SODIUM SCH MG: 100 CAPSULE, LIQUID FILLED ORAL at 20:20

## 2023-03-11 RX ADMIN — METRONIDAZOLE SCH MLS/HR: 500 INJECTION, SOLUTION INTRAVENOUS at 23:52

## 2023-03-11 RX ADMIN — INSULIN ASPART SCH: 100 INJECTION, SOLUTION INTRAVENOUS; SUBCUTANEOUS at 13:47

## 2023-03-11 RX ADMIN — CEFEPIME HYDROCHLORIDE SCH MLS/HR: 2 INJECTION, POWDER, FOR SOLUTION INTRAVENOUS at 23:51

## 2023-03-11 RX ADMIN — TAMSULOSIN HYDROCHLORIDE SCH MG: 0.4 CAPSULE ORAL at 09:12

## 2023-03-11 RX ADMIN — HEPARIN SODIUM SCH UNIT: 5000 INJECTION INTRAVENOUS; SUBCUTANEOUS at 20:20

## 2023-03-11 RX ADMIN — LITHIUM CARBONATE SCH MG: 300 CAPSULE, GELATIN COATED ORAL at 20:20

## 2023-03-11 RX ADMIN — METRONIDAZOLE SCH MLS/HR: 500 INJECTION, SOLUTION INTRAVENOUS at 09:21

## 2023-03-11 RX ADMIN — LEVOTHYROXINE SODIUM SCH MCG: 50 TABLET ORAL at 06:17

## 2023-03-11 RX ADMIN — INSULIN ASPART SCH: 100 INJECTION, SOLUTION INTRAVENOUS; SUBCUTANEOUS at 20:25

## 2023-03-11 RX ADMIN — ATORVASTATIN CALCIUM SCH MG: 10 TABLET, FILM COATED ORAL at 20:20

## 2023-03-11 RX ADMIN — PANTOPRAZOLE SODIUM SCH MG: 40 INJECTION, POWDER, FOR SOLUTION INTRAVENOUS at 09:13

## 2023-03-11 RX ADMIN — INSULIN ASPART SCH: 100 INJECTION, SOLUTION INTRAVENOUS; SUBCUTANEOUS at 17:10

## 2023-03-11 RX ADMIN — POTASSIUM CHLORIDE SCH: 14.9 INJECTION, SOLUTION INTRAVENOUS at 13:48

## 2023-03-11 RX ADMIN — INSULIN ASPART SCH: 100 INJECTION, SOLUTION INTRAVENOUS; SUBCUTANEOUS at 00:11

## 2023-03-11 RX ADMIN — HEPARIN SODIUM SCH UNIT: 5000 INJECTION INTRAVENOUS; SUBCUTANEOUS at 09:13

## 2023-03-11 RX ADMIN — SODIUM BICARBONATE SCH MLS/HR: 84 INJECTION, SOLUTION INTRAVENOUS at 16:16

## 2023-03-11 RX ADMIN — PROPRANOLOL HYDROCHLORIDE SCH MG: 80 CAPSULE, EXTENDED RELEASE ORAL at 09:13

## 2023-03-11 RX ADMIN — INSULIN ASPART SCH: 100 INJECTION, SOLUTION INTRAVENOUS; SUBCUTANEOUS at 08:04

## 2023-03-11 RX ADMIN — METRONIDAZOLE SCH MLS/HR: 500 INJECTION, SOLUTION INTRAVENOUS at 00:18

## 2023-03-11 RX ADMIN — CEFEPIME HYDROCHLORIDE SCH MLS/HR: 2 INJECTION, POWDER, FOR SOLUTION INTRAVENOUS at 16:17

## 2023-03-11 RX ADMIN — CEFEPIME HYDROCHLORIDE SCH MLS/HR: 2 INJECTION, POWDER, FOR SOLUTION INTRAVENOUS at 08:36

## 2023-03-11 RX ADMIN — CITALOPRAM HYDROBROMIDE SCH MG: 10 TABLET ORAL at 09:13

## 2023-03-11 RX ADMIN — DOCUSATE SODIUM SCH MG: 100 CAPSULE, LIQUID FILLED ORAL at 09:12

## 2023-03-11 RX ADMIN — SODIUM BICARBONATE SCH MLS/HR: 84 INJECTION, SOLUTION INTRAVENOUS at 00:20

## 2023-03-11 NOTE — P.PN
Subjective


Progress Note Date: 03/11/23














CHIEF COMPLAINT: Bowel obstruction





HISTORY OF PRESENT ILLNESS: The patient is a 57-year-old male status post 

colectomy 03/07/2023 for colectomy due to bowel obstruction.  He is resting 

comfortably.  No new complaints.





ROS: No reports of nausea and vomiting.  No fevers or chills.  No new chest 

pain.  No productive sputum.  Morbid obesity excess calories, BMI 35.0





PHYSICAL EXAM: 


VITAL SIGNS: Reviewed


CONSTITUTIONAL:  Well developed and in no acute distress. 


EYES:  Conjuctivae without sclera icterus. Extraocular movements grossly intact.




HEAD, EARS, NOSE, THROAT: Moist buccal mucosa. Head is atraumatic, 

normocephalic. Hears conversational speech. No nasal drainage.


RESPIRATORY:  Non-labored respirations and equal bilateral excursions.


CARDIOVASCULAR:  Palpable 2+ radial pulses.  


ABDOMEN: No peritonitis.  Dressing intact.


MUSCULOSKELETAL:  No gross deformity of the lower extremities noted.  No 

clubbing.  No cyanosis.


SKIN: Good skin turgor. Well perfused. 


NEUROLOGIC: Cranial nerves II through XII grossly intact.  No focal or 

lateralizing signs. 


PSYCH:  Lethargic.





CLINICAL LABS: Reviewed.  WBC normal.  Hemoglobin 9.3, low, anemia





ASSESSMENT: 


1.  Large bowel obstruction status post colectomy


2.  Chronic iron deficiency anemia


3.  Morbid obesity due to excess calories, BMI 35.0





PLAN: 


1.  Monitor hemoglobin for anemia


2.  Monitor for bowel function.


3.  Abdominal x-rays ordered.





Objective





- Vital Signs


Vital signs: 


                                   Vital Signs











Temp  98.8 F   03/11/23 12:12


 


Pulse  90   03/11/23 12:12


 


Resp  18   03/11/23 12:12


 


BP  111/69   03/11/23 12:12


 


Pulse Ox  91 L  03/11/23 12:12


 


FiO2      








                                 Intake & Output











 03/11/23 03/11/23 03/12/23





 06:59 18:59 07:59


 


Intake Total 100 0 


 


Output Total 3600 2800 


 


Balance -3500 -2800 


 


Intake:   


 


  Oral 100 0 


 


Output:   


 


  Urine 3600 2800 


 


Other:   


 


  Voiding Method Indwelling Catheter Indwelling Catheter 














- Labs


CBC & Chem 7: 


                                 03/11/23 10:01





                                 03/11/23 10:01


Labs: 


                  Abnormal Lab Results - Last 24 Hours (Table)











  03/11/23 03/11/23 03/11/23 Range/Units





  10:01 10:01 16:54 


 


RBC  3.21 L    (4.30-5.90)  m/uL


 


Hgb  9.3 L    (13.0-17.5)  gm/dL


 


Hct  29.1 L    (39.0-53.0)  %


 


Sodium   147 H   (137-145)  mmol/L


 


Chloride   120 H   ()  mmol/L


 


Carbon Dioxide   18 L   (22-30)  mmol/L


 


POC Glucose (mg/dL)    114 H  ()  mg/dL


 


Calcium   8.1 L   (8.4-10.2)  mg/dL


 


Magnesium   2.4 H   (1.6-2.3)  mg/dL


 


Total Protein   4.9 L   (6.3-8.2)  g/dL


 


Albumin   2.5 L   (3.5-5.0)  g/dL














  03/11/23 Range/Units





  20:02 


 


RBC   (4.30-5.90)  m/uL


 


Hgb   (13.0-17.5)  gm/dL


 


Hct   (39.0-53.0)  %


 


Sodium   (137-145)  mmol/L


 


Chloride   ()  mmol/L


 


Carbon Dioxide   (22-30)  mmol/L


 


POC Glucose (mg/dL)  119 H  ()  mg/dL


 


Calcium   (8.4-10.2)  mg/dL


 


Magnesium   (1.6-2.3)  mg/dL


 


Total Protein   (6.3-8.2)  g/dL


 


Albumin   (3.5-5.0)  g/dL

## 2023-03-11 NOTE — P.PN
Subjective


Progress Note Date: 03/11/23





Hospital course:





Patient is a very pleasant 57-year-old male with a past medical history of type 

II non-insulin-dependent diabetes mellitus, hypertension, hyperlipidemia, GERD, 

schizoaffective disorder, anxiety, bipolar, depression, and previous inguinal 

hernia with repair.  Presented to the emergency department with a chief 

complaint of abdominal/epigastric/chest pain, nausea, vomiting, abdominal 

distention and constipation 2 weeks.  Patient reports vomiting dark black 

coffee-colored emesis and inability to have a bowel movement 2 weeks.  He 

reports pain institute diffuse abdomen radiating up into his epigastric region 

and chest.  He denies having any fevers, chills, palpitations, shortness of 

breath, cough or congestion, hemoptysis, melena, or hematochezia.  Patient 

reports decreased appetite and inability to hold down oral intake.  He does 

report decreased urinary output, but denies any retention or difficulties 

initiating a stream.  Reports concerns of dehydration.  Patient underwent full 

evaluation in the emergency department.  Labs completed and reviewed.  He was 

found to have leukocytosis with WBC count of 18.1 and normocytic anemia with 

hemoglobin of 12.6.  Coagulation profile normal findings.  BMP revealing 

hyponatremia with sodium 132 and hypochloremia with chloride 95 and acute kidney

injury with BUN 59, creatinine 2.11, and GFR of 34 as well as hyperglycemia with

glucose of 252.  Troponin less than 0.012.  EKG showing sinus tachycardia at 102

bpm with T-wave inversion in lateral leads I and aVL, T-wave inversion is new 

finding when compared to previous EKG completed 2/3/20.  Chest x-ray completed 

and report reviewed stating limited inspiration with bilateral consolidation and

right basilar atelectasis or infiltrate.  CT abdomen and pelvis without contrast

completed and radiology report reviewed showing dilated small bowel, 

cecum/ascending colon with transition point within the proximal third of the 

transverse colon with focal narrowing in none distention of the remainder of the

colon, findings possibly representing stricture versus mass, small bowel 

dilation with small bowel feces throughout suggesting delayed transit/fecal 

stasis and dilated stomach and esophagus filled with ingested contents.  

Discussed these  findings with the ED physicianin detail and patient was 

accepted for admission to general surgery unit with telemetry under our services

with consultation to general surgery.  Patient taken for colectomy on 3/7/23.





Physical exam:





Patient seen and fully evaluated at bedside this morning.  He is postoperative 

day 4   Patient continues to have reports of mild postoperative pain/discomfort.

 Patient continues to have abdominal distention and denies having any flatus or 

bowel movements at this time.





Vital signs reviewed and stable. 


General: Nontoxic, no distress and appears stated age.  


Derm: Skin warm and dry, normal coloration for ethnicity.


Head: Atraumatic, normocephalic and symmetric.  


Eyes: EOMs intact, no lid lag, and anicteric sclera


Mouth: no lip lesions, mucus membranes moist


Cardiovascular: regular rate and rhythm with normal S1S2, no murmur, positive 

posterior tibial pulses bilaterally, and cap refill < 2 seconds.  


Lungs: Respirations even, regular, and unlabored on room air. Lungs CTA 

bilaterally, no rhonchi, no rales, no wheezing, and no accessory muscle usage. 


Abdominal: Abdomen soft and slightly distended, wound VAC in place.


Ext: ROM intact. No gross muscle atrophy, no edema, no contractures


Neuro: Speech clear, face symmetrical and CN II-XII grossly intact with no noted

focal neuro deficits


Psych: Alert and oriented to person, place, time, and situation. Appropriate and

pleasant affect. 





Assessment and Plan of Care:





Hyperchloremic hypernatremia, improving


-Reviewed morning labs.  BMP hypernatremia with sodium 147 and hyperchloremia 

with chloride of 120.


-0.45% NS discontinued and patient started on D5 0.45% normal saline at 75 mL's 

per hour as blood glucose only 95 as well.


-Order placed for repeat morning BMP to follow up on sodium and chloride results

and additional changes to infusion rate/fluid to be made based upon these 

findings.


-Order placed for point-of-care glucose every 4 hours and to remain every 4 

hours until no longer NPO. 





Small bowel obstruction, status post right colectomy and partial omentectomy on 

3/7/23, postoperative day 4


Dilated proximal transverse right colon


Acute Postoperative blood loss anemia, expected finding and stable


-Labs completed and reviewed.  CBC showing stable normocytic anemia with 

hemoglobin of 9.3.  No need for transfusion at this time.


-Gen. surgery following, reviewed documentation in chart stating pt to remain 

NPO until return of bowel function


-Continue with Protonix 40 mg IVP twice daily for GI prophylaxis at this time.


-Order placed for repeat morning CBC to follow up on normocytic anemia to 

continue to monitor for improvement.





EKG changes


-EKG showing sinus tachycardia at 102 bpm with T-wave inversion in lateral leads

I and aVL, T-wave inversion is new finding when compared to previous EKG 

completed 2/3/20 upon personal review and interpretation of both.


-Heart score 3. with risk of MACE 0.9-1.7%.  Troponins were trended overnight 

all resulting at less than 0.0123 draws.  Patient asymptomatic at this time.  

Therefore recommending no need for further cardiac testing or evaluation at this

time.





Diabetes mellitus with hyperglycemia


-Continue glycemic protocol with NovoLog sliding scale every 4 hours while 

patient remains nothing by mouth.  Fasting blood glucose this morning 113.





Schizoaffective disorder


Anxiety and depression


Obsessive compulsive disorder


Bipolar disorder


-Continue patient's home medication regimen with Lamictal, Clozaril and lithium.




-Discontinued Glycopyrrolate and clomipramine.


-Psychiatry following, documentation reviewed, psychiatrist placed patient on 

citalopram 10 mg daily to replace clomipramine and no need to replace 

Glycopyrrolate


-Lithium level therapeutic at 0.8.





Resolved:


Acute kidney injury, secondary to dehydration.  Resolved.


Dehydration secondary to intractable nausea and vomiting. Resolved.


Leukocytosis, Resolved.


Hyponatremia, resolved


Hypochloremia, resolved








CODE STATUS: Full code


DVT prophylaxis: SCDs


Discussed with: Patient and RN


Anticipated discharge date: Clinical course to determine


Anticipated discharge place: Home





Patient was seen independently by nurse practitioner.





This document was prepared using Dragon dictation software.  Please allow for 

errors in transcription while rare they do occur.








I reviewed the documentation as provided by the KAMAR above, who is the original 

author of this note.  I agree with the documented assessment and plan, with the 

following changes: none





Objective





- Vital Signs


Vital signs: 


                                   Vital Signs











Temp  98.2 F   03/11/23 07:24


 


Pulse  94   03/11/23 07:24


 


Resp  16   03/11/23 07:24


 


BP  121/73   03/11/23 07:24


 


Pulse Ox  95   03/11/23 07:55


 


FiO2      








                                 Intake & Output











 03/10/23 03/11/23 03/11/23





 18:59 06:59 18:59


 


Intake Total  100 


 


Output Total 1050 3600 


 


Balance -1050 -3500 


 


Weight 104.326 kg  


 


Intake:   


 


  Oral  100 


 


Output:   


 


  Urine 1050 3600 


 


Other:   


 


  Voiding Method Indwelling Catheter Indwelling Catheter 














- Labs


CBC & Chem 7: 


                                 03/11/23 10:01





                                 03/11/23 10:01


Labs: 


                  Abnormal Lab Results - Last 24 Hours (Table)











  03/10/23 03/10/23 Range/Units





  07:28 11:37 


 


RBC  3.00 L   (4.40-5.60)  X 10*6/uL


 


Hgb  8.5 L   (13.0-17.0)  g/dL


 


Hct  27.5 L   (39.6-50.0)  %


 


MCHC  30.9 L   (32.0-37.0)  g/dL


 


POC Glucose (mg/dL)   148 H  ()  mg/dL

## 2023-03-12 LAB
ALBUMIN SERPL-MCNC: 2.8 G/DL (ref 3.8–4.9)
ALBUMIN/GLOB SERPL: 1.33 G/DL (ref 1.6–3.17)
ALP SERPL-CCNC: 59 U/L (ref 41–126)
ALT SERPL-CCNC: 20 U/L (ref 10–49)
ANION GAP SERPL CALC-SCNC: 9.5 MMOL/L (ref 10–18)
AST SERPL-CCNC: 39 U/L (ref 14–35)
BUN SERPL-SCNC: 13.7 MG/DL (ref 9–27)
BUN/CREAT SERPL: 12.23 RATIO (ref 12–20)
CALCIUM SPEC-MCNC: 8.6 MG/DL (ref 8.7–10.3)
CHLORIDE SERPL-SCNC: 121 MMOL/L (ref 96–109)
CO2 SERPL-SCNC: 19.6 MMOL/L (ref 20–27.5)
ERYTHROCYTE [DISTWIDTH] IN BLOOD BY AUTOMATED COUNT: 2.97 X 10*6/UL (ref 4.4–5.6)
ERYTHROCYTE [DISTWIDTH] IN BLOOD: 14.6 % (ref 11.5–14.5)
GLOBULIN SER CALC-MCNC: 2.1 G/DL (ref 1.6–3.3)
GLUCOSE BLD-MCNC: 129 MG/DL (ref 70–110)
GLUCOSE BLD-MCNC: 129 MG/DL (ref 70–110)
GLUCOSE BLD-MCNC: 148 MG/DL (ref 70–110)
GLUCOSE BLD-MCNC: 158 MG/DL (ref 70–110)
GLUCOSE BLD-MCNC: 184 MG/DL (ref 70–110)
GLUCOSE BLD-MCNC: 205 MG/DL (ref 70–110)
GLUCOSE SERPL-MCNC: 135 MG/DL (ref 70–110)
HCT VFR BLD AUTO: 27.7 % (ref 39.6–50)
HGB BLD-MCNC: 8.7 G/DL (ref 13–17)
LITHIUM SERPL-MCNC: 0.7 MMOL/L
MAGNESIUM SPEC-SCNC: 2.4 MG/DL (ref 1.5–2.4)
MCH RBC QN AUTO: 29.3 PG (ref 27–32)
MCHC RBC AUTO-ENTMCNC: 31.4 G/DL (ref 32–37)
MCV RBC AUTO: 93.3 FL (ref 80–97)
NRBC BLD AUTO-RTO: 0.2 /100 WBCS (ref 0–0)
PLATELET # BLD AUTO: 316 X 10*3/UL (ref 140–440)
POTASSIUM SERPL-SCNC: 3.5 MMOL/L (ref 3.5–5.5)
PROT SERPL-MCNC: 5 G/DL (ref 6.2–8.2)
SODIUM SERPL-SCNC: 147 MMOL/L (ref 137–145)
SODIUM SERPL-SCNC: 150 MMOL/L (ref 135–145)
WBC # BLD AUTO: 11.1 X 10*3/UL (ref 4.5–10)

## 2023-03-12 RX ADMIN — INSULIN ASPART SCH UNIT: 100 INJECTION, SOLUTION INTRAVENOUS; SUBCUTANEOUS at 22:19

## 2023-03-12 RX ADMIN — LEVOTHYROXINE SODIUM SCH MCG: 50 TABLET ORAL at 05:17

## 2023-03-12 RX ADMIN — CEFEPIME HYDROCHLORIDE SCH MLS/HR: 2 INJECTION, POWDER, FOR SOLUTION INTRAVENOUS at 10:28

## 2023-03-12 RX ADMIN — DOCUSATE SODIUM SCH MG: 100 CAPSULE, LIQUID FILLED ORAL at 09:09

## 2023-03-12 RX ADMIN — INSULIN ASPART SCH UNIT: 100 INJECTION, SOLUTION INTRAVENOUS; SUBCUTANEOUS at 12:31

## 2023-03-12 RX ADMIN — CEFEPIME HYDROCHLORIDE SCH MLS/HR: 2 INJECTION, POWDER, FOR SOLUTION INTRAVENOUS at 16:38

## 2023-03-12 RX ADMIN — PANTOPRAZOLE SODIUM SCH MG: 40 INJECTION, POWDER, FOR SOLUTION INTRAVENOUS at 22:18

## 2023-03-12 RX ADMIN — PANTOPRAZOLE SODIUM SCH MG: 40 INJECTION, POWDER, FOR SOLUTION INTRAVENOUS at 10:40

## 2023-03-12 RX ADMIN — HEPARIN SODIUM SCH UNIT: 5000 INJECTION INTRAVENOUS; SUBCUTANEOUS at 22:19

## 2023-03-12 RX ADMIN — ATORVASTATIN CALCIUM SCH MG: 10 TABLET, FILM COATED ORAL at 22:17

## 2023-03-12 RX ADMIN — INSULIN ASPART SCH UNIT: 100 INJECTION, SOLUTION INTRAVENOUS; SUBCUTANEOUS at 17:30

## 2023-03-12 RX ADMIN — TAMSULOSIN HYDROCHLORIDE SCH MG: 0.4 CAPSULE ORAL at 09:09

## 2023-03-12 RX ADMIN — DOCUSATE SODIUM SCH MG: 100 CAPSULE, LIQUID FILLED ORAL at 22:17

## 2023-03-12 RX ADMIN — METRONIDAZOLE SCH MLS/HR: 500 INJECTION, SOLUTION INTRAVENOUS at 16:37

## 2023-03-12 RX ADMIN — POTASSIUM CHLORIDE SCH: 14.9 INJECTION, SOLUTION INTRAVENOUS at 14:39

## 2023-03-12 RX ADMIN — PROPRANOLOL HYDROCHLORIDE SCH MG: 80 CAPSULE, EXTENDED RELEASE ORAL at 09:10

## 2023-03-12 RX ADMIN — CITALOPRAM HYDROBROMIDE SCH MG: 10 TABLET ORAL at 09:09

## 2023-03-12 RX ADMIN — INSULIN ASPART SCH: 100 INJECTION, SOLUTION INTRAVENOUS; SUBCUTANEOUS at 07:42

## 2023-03-12 RX ADMIN — POTASSIUM CHLORIDE SCH MLS/HR: 14.9 INJECTION, SOLUTION INTRAVENOUS at 10:41

## 2023-03-12 RX ADMIN — HEPARIN SODIUM SCH UNIT: 5000 INJECTION INTRAVENOUS; SUBCUTANEOUS at 09:09

## 2023-03-12 RX ADMIN — METRONIDAZOLE SCH MLS/HR: 500 INJECTION, SOLUTION INTRAVENOUS at 07:20

## 2023-03-12 NOTE — P.PN
Subjective


Progress Note Date: 03/12/23





Hospital course:





Patient is a very pleasant 57-year-old male with a past medical history of type 

II non-insulin-dependent diabetes mellitus, hypertension, hyperlipidemia, GERD, 

schizoaffective disorder, anxiety, bipolar, depression, and previous inguinal 

hernia with repair.  Presented to the emergency department with a chief 

complaint of abdominal/epigastric/chest pain, nausea, vomiting, abdominal 

distention and constipation 2 weeks.  Patient reports vomiting dark black 

coffee-colored emesis and inability to have a bowel movement 2 weeks.  He 

reports pain institute diffuse abdomen radiating up into his epigastric region 

and chest.  He denies having any fevers, chills, palpitations, shortness of 

breath, cough or congestion, hemoptysis, melena, or hematochezia.  Patient 

reports decreased appetite and inability to hold down oral intake.  He does 

report decreased urinary output, but denies any retention or difficulties 

initiating a stream.  Reports concerns of dehydration.  Patient underwent full 

evaluation in the emergency department.  Labs completed and reviewed.  He was 

found to have leukocytosis with WBC count of 18.1 and normocytic anemia with 

hemoglobin of 12.6.  Coagulation profile normal findings.  BMP revealing 

hyponatremia with sodium 132 and hypochloremia with chloride 95 and acute kidney

injury with BUN 59, creatinine 2.11, and GFR of 34 as well as hyperglycemia with

glucose of 252.  Troponin less than 0.012.  EKG showing sinus tachycardia at 102

bpm with T-wave inversion in lateral leads I and aVL, T-wave inversion is new 

finding when compared to previous EKG completed 2/3/20.  Chest x-ray completed 

and report reviewed stating limited inspiration with bilateral consolidation and

right basilar atelectasis or infiltrate.  CT abdomen and pelvis without contrast

completed and radiology report reviewed showing dilated small bowel, 

cecum/ascending colon with transition point within the proximal third of the 

transverse colon with focal narrowing in none distention of the remainder of the

colon, findings possibly representing stricture versus mass, small bowel 

dilation with small bowel feces throughout suggesting delayed transit/fecal 

stasis and dilated stomach and esophagus filled with ingested contents.  

Discussed these  findings with the ED physicianin detail and patient was 

accepted for admission to general surgery unit with telemetry under our services

with consultation to general surgery.  Patient taken for colectomy on 3/7/23.  

Awaiting for return of bowel function.  Repeat abdominal x-ray completed on 

3/12/23 reviewed and radiology report showing nonspecific bowel gas pattern 

without radiographic evidence for acute process with persistent few gaseous 

dilated loops of bowel remaining. 





Physical exam:





Patient seen and fully evaluated at bedside this morning.  He is postoperative 

day 5  patient was sleeping and easily awoken via verbal stimuli.  Patient remai

ns NPO with ice chips only.  Patient continues to deny passing any flatus or 

having any bowel movements at this time.  Abdominal x-ray was reordered and 

radiology report reviewed showing nonspecific bowel gas pattern without 

radiographic evidence for acute process with persistent few gaseous dilated 

loops of bowel remaining.  Patient has had significant urinary output over the 

past 24 hours with a documented 7450 mL.  Morning labs revealing worsening 

hyperchloremic hypernatremia with sodium of 115 chloride of 121.





Vital signs reviewed and stable. 


General: Nontoxic, no distress and appears stated age.  


Derm: Skin warm and dry, normal coloration for ethnicity.


Head: Atraumatic, normocephalic and symmetric.  


Eyes: EOMs intact, no lid lag, and anicteric sclera


Mouth: no lip lesions, mucus membranes moist


Cardiovascular: regular rate and rhythm with normal S1S2, no murmur, positive 

posterior tibial pulses bilaterally, and cap refill < 2 seconds.  


Lungs: Respirations even, regular, and unlabored on room air. Lungs CTA 

bilaterally, no rhonchi, no rales, no wheezing, and no accessory muscle usage. 


Abdominal: Abdomen soft and slightly distended, dressing intact midline and 

wound VAC in place.


Ext: ROM intact. No gross muscle atrophy, no edema, no contractures


Neuro: Speech clear, face symmetrical and CN II-XII grossly intact with no noted

focal neuro deficits


Psych: Alert and oriented to person, place, time, and situation. Appropriate and

pleasant affect. 





Assessment and Plan of Care:





Hyperchloremic hypernatremia, worsening


-Morning labs reviewed showing worsening hyperchloremic hypernatremia with 

sodium of 150 and chloride of 121, blood glucose normal findings at 135 and 

renal function remains stable with BUN of 13.7, creatinine 1.1, and GFR of 73.6.


-Urinary output over the past 24 hours significantly increased.  Documented 

urinary output over the past 24 hours was 7450 mL.


-Pt has a 2.2 L free water deficit.  Discontinued D5 0.45% and started patient 

on D5W at 125 mL's per hour.


-Concerns for possible diabetes insipidus as patient is a diabetic and on 

lithium.


-Lithium discontinued at this time.


-Order placed for stat lithium level, TSH, urine sodium, and urine osmolality


-Order placed for consult to nephrologist. Called and discussed plan of care 

with Dr. Edwards nephrologist, he was in agreement with D5W with 125 mL's per hour

and instructed once urine osmolality has resulted and urinary output remains 

elevated he recommends administering DDAVP 2 g subcu 1 dose and repeat sodium 

level every 6 hours.


-Continue point-of-care glucose every 4 hours until no longer NPO.


-Order placed for repeat sodium levels every 6 hours and we will repeat BMP 

tomorrow morning.





Small bowel obstruction, status post right colectomy and partial omentectomy on 

3/7/23, postoperative day 5


Dilated proximal transverse right colon


Acute Postoperative blood loss anemia, expected finding and stable


-Labs completed and reviewed.  CBC showing stable normocytic anemia with 

hemoglobin of 8.7, mild leukocytosis with WBC count of 11.10.  No need for 

transfusion at this time.


-Order placed for 2 view abdominal x-ray this morning, and followed up on 

radiology report stating nonspecific bowel gas pattern without radiographic 

evidence for acute process with persistent few gaseous dilated loops of bowel 

remaining. 


-Gen. surgery following and discussed plan of care with Dr. Mae. Pt to 

continue NPO status with ice chips only.


-Continue with Protonix 40 mg IVP twice daily for GI prophylaxis at this time.


-Order placed for repeat morning CBC to follow up on normocytic anemia to 

continue to monitor for improvement/resolution and follow up with mild 

leukocytosis.


-Patient to continue with IV antibiotics cefepime and Flagyl


-Management of post surgical wound/wound VAC per general surgery team.





EKG changes


-EKG showing sinus tachycardia at 102 bpm with T-wave inversion in lateral leads

I and aVL, T-wave inversion is new finding when compared to previous EKG 

completed 2/3/20 upon personal review and interpretation of both.


-Heart score 3. with risk of MACE 0.9-1.7%.  Troponins were trended overnight 

all resulting at less than 0.0123 draws.  Patient asymptomatic at this time.  

Therefore recommending no need for further cardiac testing or evaluation at this

time.





Diabetes mellitus with hyperglycemia


-Continue glycemic protocol with NovoLog sliding scale every 4 hours while 

patient remains nothing by mouth.  Fasting blood glucose this morning 135.





Schizoaffective disorder


Anxiety and depression


Obsessive compulsive disorder


Bipolar disorder


-Continue patient's home medication regimen with Lamictal and Clozaril.  Lithium

discontinued secondary to concerns of diabetes insipidus.


-Discontinued Glycopyrrolate and clomipramine.


-Psychiatry following, documentation reviewed, psychiatrist placed patient on 

citalopram 10 mg daily to replace clomipramine and no need to replace 

Glycopyrrolate


-Lithium level therapeutic at 0.8.





Resolved:


Acute kidney injury, secondary to dehydration.  Resolved.


Hyponatremia, resolved


Hypochloremia, resolved








CODE STATUS: Full code


DVT prophylaxis: SCDs


Discussed with: Patient, nephrologist, general surgeon and RN


Anticipated discharge date: Clinical course to determine


Anticipated discharge place: Home





Patient was seen independently by nurse practitioner.





This document was prepared using Dragon dictation software.  Please allow for 

errors in transcription while rare they do occur.








I reviewed the documentation as provided by the KAMAR above, who is the original 

author of this note.  I agree with the documented assessment and plan, with the 

following changes: none





Objective





- Vital Signs


Vital signs: 


                                   Vital Signs











Temp  97.8 F   03/12/23 07:24


 


Pulse  93   03/12/23 07:24


 


Resp  18   03/12/23 07:24


 


BP  134/73   03/12/23 07:24


 


Pulse Ox  96   03/12/23 07:54


 


FiO2      








                                 Intake & Output











 03/11/23 03/12/23 03/12/23





 17:59 06:59 18:59


 


Intake Total   


 


Output Total   


 


Balance   


 


Intake:   


 


  Oral   


 


Output:   


 


  Urine   


 


Other:   


 


  Voiding Method   














- Labs


CBC & Chem 7: 


                                 03/13/23 05:58





                                 03/13/23 05:58


Labs: 


                  Abnormal Lab Results - Last 24 Hours (Table)











  03/11/23 03/11/23 03/11/23 Range/Units





  10:01 10:01 16:54 


 


WBC     (4.50-10.00)  X 10*3/uL


 


RBC  3.21 L    (4.30-5.90)  m/uL


 


Hgb  9.3 L    (13.0-17.5)  gm/dL


 


Hct  29.1 L    (39.0-53.0)  %


 


MCHC     (32.0-37.0)  g/dL


 


RDW     (11.5-14.5)  %


 


MPV     (9.5-12.2)  fL


 


Absolute Nucleated RBC     (0.00-0.00)  X 10*3/uL


 


NRBC/100 WBC Diff     (0.0-0.0)  /100 WBCS


 


Sodium   147 H   (137-145)  mmol/L


 


Chloride   120 H   ()  mmol/L


 


Carbon Dioxide   18 L   (22-30)  mmol/L


 


POC Glucose (mg/dL)    114 H  ()  mg/dL


 


Calcium   8.1 L   (8.4-10.2)  mg/dL


 


Magnesium   2.4 H   (1.6-2.3)  mg/dL


 


Total Protein   4.9 L   (6.3-8.2)  g/dL


 


Albumin   2.5 L   (3.5-5.0)  g/dL














  03/11/23 03/12/23 03/12/23 Range/Units





  20:02 01:14 05:20 


 


WBC     (4.50-10.00)  X 10*3/uL


 


RBC     (4.30-5.90)  m/uL


 


Hgb     (13.0-17.5)  gm/dL


 


Hct     (39.0-53.0)  %


 


MCHC     (32.0-37.0)  g/dL


 


RDW     (11.5-14.5)  %


 


MPV     (9.5-12.2)  fL


 


Absolute Nucleated RBC     (0.00-0.00)  X 10*3/uL


 


NRBC/100 WBC Diff     (0.0-0.0)  /100 WBCS


 


Sodium     (137-145)  mmol/L


 


Chloride     ()  mmol/L


 


Carbon Dioxide     (22-30)  mmol/L


 


POC Glucose (mg/dL)  119 H  129 H  129 H  ()  mg/dL


 


Calcium     (8.4-10.2)  mg/dL


 


Magnesium     (1.6-2.3)  mg/dL


 


Total Protein     (6.3-8.2)  g/dL


 


Albumin     (3.5-5.0)  g/dL














  03/12/23 03/12/23 Range/Units





  06:07 07:28 


 


WBC  11.10 H   (4.50-10.00)  X 10*3/uL


 


RBC  2.97 L   (4.30-5.90)  m/uL


 


Hgb  8.7 L   (13.0-17.5)  gm/dL


 


Hct  27.7 L   (39.0-53.0)  %


 


MCHC  31.4 L   (32.0-37.0)  g/dL


 


RDW  14.6 H   (11.5-14.5)  %


 


MPV  9.4 L   (9.5-12.2)  fL


 


Absolute Nucleated RBC  0.02 H   (0.00-0.00)  X 10*3/uL


 


NRBC/100 WBC Diff  0.2 H   (0.0-0.0)  /100 WBCS


 


Sodium    (137-145)  mmol/L


 


Chloride    ()  mmol/L


 


Carbon Dioxide    (22-30)  mmol/L


 


POC Glucose (mg/dL)   148 H  ()  mg/dL


 


Calcium    (8.4-10.2)  mg/dL


 


Magnesium    (1.6-2.3)  mg/dL


 


Total Protein    (6.3-8.2)  g/dL


 


Albumin    (3.5-5.0)  g/dL

## 2023-03-12 NOTE — P.PN
Subjective


Progress Note Date: 03/12/23














CHIEF COMPLAINT: Bowel obstruction





HISTORY OF PRESENT ILLNESS: The patient is a 57-year-old male status post 

colectomy 03/07/2023 for colectomy due to bowel obstruction.  He denies moderate

abdominal pain. He has an appetite.





ROS:  No fevers or chills.  No new chest pain.  No productive sputum.  Morbid 

obesity excess calories, BMI 35.0





PHYSICAL EXAM: 


VITAL SIGNS: Reviewed


CONSTITUTIONAL:  Well developed and in no acute distress. 


EYES:  Conjuctivae without sclera icterus. Extraocular movements grossly intact.




HEAD, EARS, NOSE, THROAT: Moist buccal mucosa. Head is atraumatic, 

normocephalic. Hears conversational speech. No nasal drainage.


RESPIRATORY:  Non-labored respirations and equal bilateral excursions.


CARDIOVASCULAR:  Palpable 2+ radial pulses.  


ABDOMEN: No peritonitis.  Midline dressing intact. Abdominal binder present. 

Expected incisional tenderness along midline and left lower quadrant. 


MUSCULOSKELETAL:  No gross deformity of the lower extremities noted.  No 

clubbing.  No cyanosis.


SKIN: Good skin turgor. Well perfused. 


NEUROLOGIC: Cranial nerves II through XII grossly intact.  No focal or 

lateralizing signs. 


PSYCH:  Lethargic.





CLINICAL LABS: Reviewed.  WBC elevated, 11.1. Hemoglobin down 9.3 to 8.7





ASSESSMENT: 


1.  Large bowel obstruction status post colectomy


2.  Chronic iron deficiency anemia


3.  Morbid obesity due to excess calories, BMI 35.0





PLAN: 


1.  May start full liquid diet


2.  Monitor WBC. 





Objective





- Vital Signs


Vital signs: 


                                   Vital Signs











Temp  98.2 F   03/12/23 16:00


 


Pulse  77   03/12/23 16:00


 


Resp  14   03/12/23 16:00


 


BP  131/76   03/12/23 16:00


 


Pulse Ox  97   03/12/23 16:00


 


FiO2      








                                 Intake & Output











 03/12/23 03/12/23 03/13/23





 06:59 18:59 06:59


 


Intake Total  480 


 


Output Total  2525 


 


Balance  -2045 


 


Intake:   


 


  Oral  480 


 


Output:   


 


  Urine  2525 


 


Other:   


 


  Voiding Method  Indwelling Catheter 














- Labs


CBC & Chem 7: 


                                 03/12/23 06:07





                                 03/12/23 18:24


Labs: 


                  Abnormal Lab Results - Last 24 Hours (Table)











  03/11/23 03/12/23 03/12/23 Range/Units





  20:02 01:14 05:20 


 


WBC     (4.50-10.00)  X 10*3/uL


 


RBC     (4.40-5.60)  X 10*6/uL


 


Hgb     (13.0-17.0)  g/dL


 


Hct     (39.6-50.0)  %


 


MCHC     (32.0-37.0)  g/dL


 


RDW     (11.5-14.5)  %


 


MPV     (9.5-12.2)  fL


 


Absolute Nucleated RBC     (0.00-0.00)  X 10*3/uL


 


NRBC/100 WBC Diff     (0.0-0.0)  /100 WBCS


 


Sodium     (135-145)  mmol/L


 


Chloride     ()  mmol/L


 


Carbon Dioxide     (20.0-27.5)  mmol/L


 


Anion Gap     (10.00-18.00)  mmol/L


 


Glucose     ()  mg/dL


 


POC Glucose (mg/dL)  119 H  129 H  129 H  ()  mg/dL


 


Calcium     (8.7-10.3)  mg/dL


 


Total Bilirubin     (0.30-1.20)  mg/dL


 


AST     (14-35)  U/L


 


Total Protein     (6.2-8.2)  g/dL


 


Albumin     (3.8-4.9)  g/dL


 


Albumin/Globulin Ratio     (1.60-3.17)  g/dL














  03/12/23 03/12/23 03/12/23 Range/Units





  06:07 06:07 07:28 


 


WBC  11.10 H    (4.50-10.00)  X 10*3/uL


 


RBC  2.97 L    (4.40-5.60)  X 10*6/uL


 


Hgb  8.7 L    (13.0-17.0)  g/dL


 


Hct  27.7 L    (39.6-50.0)  %


 


MCHC  31.4 L    (32.0-37.0)  g/dL


 


RDW  14.6 H    (11.5-14.5)  %


 


MPV  9.4 L    (9.5-12.2)  fL


 


Absolute Nucleated RBC  0.02 H    (0.00-0.00)  X 10*3/uL


 


NRBC/100 WBC Diff  0.2 H    (0.0-0.0)  /100 WBCS


 


Sodium   150 H   (135-145)  mmol/L


 


Chloride   121 H   ()  mmol/L


 


Carbon Dioxide   19.6 L   (20.0-27.5)  mmol/L


 


Anion Gap   9.50 L   (10.00-18.00)  mmol/L


 


Glucose   135 H   ()  mg/dL


 


POC Glucose (mg/dL)    148 H  ()  mg/dL


 


Calcium   8.6 L   (8.7-10.3)  mg/dL


 


Total Bilirubin   <0.15 L   (0.30-1.20)  mg/dL


 


AST   39 H   (14-35)  U/L


 


Total Protein   5.0 L   (6.2-8.2)  g/dL


 


Albumin   2.8 L   (3.8-4.9)  g/dL


 


Albumin/Globulin Ratio   1.33 L   (1.60-3.17)  g/dL














  03/12/23 03/12/23 03/12/23 Range/Units





  11:16 14:28 16:27 


 


WBC     (4.50-10.00)  X 10*3/uL


 


RBC     (4.40-5.60)  X 10*6/uL


 


Hgb     (13.0-17.0)  g/dL


 


Hct     (39.6-50.0)  %


 


MCHC     (32.0-37.0)  g/dL


 


RDW     (11.5-14.5)  %


 


MPV     (9.5-12.2)  fL


 


Absolute Nucleated RBC     (0.00-0.00)  X 10*3/uL


 


NRBC/100 WBC Diff     (0.0-0.0)  /100 WBCS


 


Sodium   147 H   (135-145)  mmol/L


 


Chloride     ()  mmol/L


 


Carbon Dioxide     (20.0-27.5)  mmol/L


 


Anion Gap     (10.00-18.00)  mmol/L


 


Glucose     ()  mg/dL


 


POC Glucose (mg/dL)  158 H   184 H  ()  mg/dL


 


Calcium     (8.7-10.3)  mg/dL


 


Total Bilirubin     (0.30-1.20)  mg/dL


 


AST     (14-35)  U/L


 


Total Protein     (6.2-8.2)  g/dL


 


Albumin     (3.8-4.9)  g/dL


 


Albumin/Globulin Ratio     (1.60-3.17)  g/dL














  03/12/23 Range/Units





  18:24 


 


WBC   (4.50-10.00)  X 10*3/uL


 


RBC   (4.40-5.60)  X 10*6/uL


 


Hgb   (13.0-17.0)  g/dL


 


Hct   (39.6-50.0)  %


 


MCHC   (32.0-37.0)  g/dL


 


RDW   (11.5-14.5)  %


 


MPV   (9.5-12.2)  fL


 


Absolute Nucleated RBC   (0.00-0.00)  X 10*3/uL


 


NRBC/100 WBC Diff   (0.0-0.0)  /100 WBCS


 


Sodium  146 H  (135-145)  mmol/L


 


Chloride   ()  mmol/L


 


Carbon Dioxide   (20.0-27.5)  mmol/L


 


Anion Gap   (10.00-18.00)  mmol/L


 


Glucose   ()  mg/dL


 


POC Glucose (mg/dL)   ()  mg/dL


 


Calcium   (8.7-10.3)  mg/dL


 


Total Bilirubin   (0.30-1.20)  mg/dL


 


AST   (14-35)  U/L


 


Total Protein   (6.2-8.2)  g/dL


 


Albumin   (3.8-4.9)  g/dL


 


Albumin/Globulin Ratio   (1.60-3.17)  g/dL

## 2023-03-12 NOTE — XR
EXAMINATION TYPE: XR abdomen 2V

 

DATE OF EXAM: 3/12/2023 10:16 AM

 

INDICATION: 

Patient age:Male;  57 years old; 

Reason for study: evaluate post op colectomy; 

 

COMPARISON: 623

 

TECHNIQUE: Two views of the abdomen were obtained.

 

FINDINGS: Skin staples project over the right abdomen. Overall the films underpenetrated no radiopaqu
e foreign bodies. 

No abnormal calcifications. No obvious pneumoperitoneum visualized. Bowel gas pattern is nonspecific.
 There remains a few gaseous dilated loops of bowel. Surgical clips are seen in the right pelvis.

 

IMPRESSION:

Nonspecific bowel gas pattern without radiographic evidence for acute process with persistent few gas
eous dilated loops of bowel.

## 2023-03-13 LAB
ALBUMIN SERPL-MCNC: 2.6 G/DL (ref 3.5–5)
ALP SERPL-CCNC: 68 U/L (ref 38–126)
ALT SERPL-CCNC: 30 U/L (ref 4–49)
ANION GAP SERPL CALC-SCNC: 7 MMOL/L
AST SERPL-CCNC: 49 U/L (ref 17–59)
BUN SERPL-SCNC: 7 MG/DL (ref 9–20)
CALCIUM SPEC-MCNC: 8.1 MG/DL (ref 8.4–10.2)
CHLORIDE SERPL-SCNC: 113 MMOL/L (ref 98–107)
CO2 SERPL-SCNC: 24 MMOL/L (ref 22–30)
ERYTHROCYTE [DISTWIDTH] IN BLOOD BY AUTOMATED COUNT: 3.11 M/UL (ref 4.3–5.9)
ERYTHROCYTE [DISTWIDTH] IN BLOOD: 14.5 % (ref 11.5–15.5)
GLUCOSE BLD-MCNC: 162 MG/DL (ref 70–110)
GLUCOSE BLD-MCNC: 163 MG/DL (ref 70–110)
GLUCOSE BLD-MCNC: 177 MG/DL (ref 70–110)
GLUCOSE BLD-MCNC: 177 MG/DL (ref 70–110)
GLUCOSE BLD-MCNC: 205 MG/DL (ref 70–110)
GLUCOSE BLD-MCNC: 242 MG/DL (ref 70–110)
GLUCOSE BLD-MCNC: 261 MG/DL (ref 70–110)
GLUCOSE SERPL-MCNC: 153 MG/DL (ref 74–99)
HCT VFR BLD AUTO: 27.5 % (ref 39–53)
HGB BLD-MCNC: 9.2 GM/DL (ref 13–17.5)
MAGNESIUM SPEC-SCNC: 2.2 MG/DL (ref 1.6–2.3)
MCH RBC QN AUTO: 29.6 PG (ref 25–35)
MCHC RBC AUTO-ENTMCNC: 33.5 G/DL (ref 31–37)
MCV RBC AUTO: 88.4 FL (ref 80–100)
PLATELET # BLD AUTO: 303 K/UL (ref 150–450)
POTASSIUM SERPL-SCNC: 3 MMOL/L (ref 3.5–5.1)
PROT SERPL-MCNC: 5.1 G/DL (ref 6.3–8.2)
SODIUM SERPL-SCNC: 144 MMOL/L (ref 137–145)
WBC # BLD AUTO: 8.8 K/UL (ref 3.8–10.6)

## 2023-03-13 RX ADMIN — CITALOPRAM HYDROBROMIDE SCH MG: 10 TABLET ORAL at 17:01

## 2023-03-13 RX ADMIN — METRONIDAZOLE SCH MLS/HR: 500 INJECTION, SOLUTION INTRAVENOUS at 00:35

## 2023-03-13 RX ADMIN — PANTOPRAZOLE SODIUM SCH MG: 40 INJECTION, POWDER, FOR SOLUTION INTRAVENOUS at 20:26

## 2023-03-13 RX ADMIN — POTASSIUM CHLORIDE SCH MLS/HR: 14.9 INJECTION, SOLUTION INTRAVENOUS at 17:02

## 2023-03-13 RX ADMIN — PANTOPRAZOLE SODIUM SCH MG: 40 INJECTION, POWDER, FOR SOLUTION INTRAVENOUS at 09:01

## 2023-03-13 RX ADMIN — TAMSULOSIN HYDROCHLORIDE SCH MG: 0.4 CAPSULE ORAL at 09:01

## 2023-03-13 RX ADMIN — POTASSIUM CHLORIDE SCH MEQ: 20 TABLET, EXTENDED RELEASE ORAL at 09:05

## 2023-03-13 RX ADMIN — LACTULOSE SCH GM: 20 SOLUTION ORAL at 12:29

## 2023-03-13 RX ADMIN — CEFEPIME HYDROCHLORIDE SCH MLS/HR: 2 INJECTION, POWDER, FOR SOLUTION INTRAVENOUS at 00:34

## 2023-03-13 RX ADMIN — POTASSIUM CHLORIDE SCH MEQ: 20 TABLET, EXTENDED RELEASE ORAL at 12:29

## 2023-03-13 RX ADMIN — DOCUSATE SODIUM SCH MG: 100 CAPSULE, LIQUID FILLED ORAL at 09:01

## 2023-03-13 RX ADMIN — LACTULOSE SCH GM: 20 SOLUTION ORAL at 20:29

## 2023-03-13 RX ADMIN — METRONIDAZOLE SCH MLS/HR: 500 INJECTION, SOLUTION INTRAVENOUS at 08:58

## 2023-03-13 RX ADMIN — PROPRANOLOL HYDROCHLORIDE SCH MG: 80 CAPSULE, EXTENDED RELEASE ORAL at 09:03

## 2023-03-13 RX ADMIN — HEPARIN SODIUM SCH UNIT: 5000 INJECTION INTRAVENOUS; SUBCUTANEOUS at 09:01

## 2023-03-13 RX ADMIN — HEPARIN SODIUM SCH UNIT: 5000 INJECTION INTRAVENOUS; SUBCUTANEOUS at 20:28

## 2023-03-13 RX ADMIN — LEVOTHYROXINE SODIUM SCH MCG: 50 TABLET ORAL at 06:27

## 2023-03-13 RX ADMIN — INSULIN ASPART SCH UNIT: 100 INJECTION, SOLUTION INTRAVENOUS; SUBCUTANEOUS at 06:35

## 2023-03-13 RX ADMIN — CEFEPIME HYDROCHLORIDE SCH MLS/HR: 2 INJECTION, POWDER, FOR SOLUTION INTRAVENOUS at 08:57

## 2023-03-13 RX ADMIN — INSULIN ASPART SCH UNIT: 100 INJECTION, SOLUTION INTRAVENOUS; SUBCUTANEOUS at 20:28

## 2023-03-13 RX ADMIN — METRONIDAZOLE SCH: 500 INJECTION, SOLUTION INTRAVENOUS at 17:03

## 2023-03-13 RX ADMIN — CEFEPIME HYDROCHLORIDE SCH: 2 INJECTION, POWDER, FOR SOLUTION INTRAVENOUS at 17:03

## 2023-03-13 RX ADMIN — DOCUSATE SODIUM SCH MG: 100 CAPSULE, LIQUID FILLED ORAL at 20:26

## 2023-03-13 RX ADMIN — ATORVASTATIN CALCIUM SCH MG: 10 TABLET, FILM COATED ORAL at 20:26

## 2023-03-13 RX ADMIN — INSULIN ASPART SCH UNIT: 100 INJECTION, SOLUTION INTRAVENOUS; SUBCUTANEOUS at 12:29

## 2023-03-13 RX ADMIN — INSULIN ASPART SCH UNIT: 100 INJECTION, SOLUTION INTRAVENOUS; SUBCUTANEOUS at 17:01

## 2023-03-13 NOTE — P.PN
Progress Note - Text


Progress Note Date: 03/12/23


Interval history: 


Patient was seen resting in bed and was directable and agreeable to speak with 

writer. He is somewhat disheveled. He oriented to person, place, month/year/day,

and situation. At this time patient denies any suicidal or homicidal ideation, 

intent or plan. Denies any auditory or visual hallucinations. He reports he has 

been on Clozapine for several years and it's the only thing that works for him. 

He follows with [facility] Dr. Osorio. He reports chronic auditory hallucinations, but 

he has learned to ignore them. He denies visual hallucinations. 





Mental status exam: 


General Appearance: Patient appears to be stated age is alert, directable, and 

cooperative.


Behavior: No agitated behavior. Patient is calm and directable.


Speech: Patient's speech is fluent and non-pressured. 


Mood/Affect: Mood is good, affect is congruent and constricted. 


Suicidality/Homicidality: Patient denies having any suicidal or homicidal 

ideation intent or plan.  


Perceptions: He reports chronic auditory hallucinations, but he has learned to 

ignore them. He denies visual hallucinations. 


Though content/process: No delusional thought content on my assessment, and 

thought process is organized.


Memory and concentration: AOX3, grossly intact for the purposes of this session


Judgment and insight: average





IMPRESSIONS: 


Small bowel obstruction


Schizoaffective disorder, depressive type


Obsessive-compulsive disorder


Alcohol use disorder, in sustained remission





PLAN: 


-Continue your medical and surgical management


-At this time patient DOES NOT meet criteria for inpatient psychiatric 

admission. The patient is currently not presenting with any acute symptoms of 

psychosis or rigoberto. He is not endorsing any suicidal or homicidal ideation, 

intention, and/or plan.  


- Clozapine level ordered for Tuesday morning. Please follow results. 


- Depending on Clozapine level, consider lowering Clozapine from 150 mg QHS to 

25 mg QAM/100 mg QHS. 


- Agree with Dr. Velez's recommendations from 3/8/23:


"Agree with holding Clomipramine and Glycopyrrolate due to anticholinergic 

effects. The patient has tried and failed numerous antipsychotic medications and

is currently on a regimen of Clozaril.  Clozaril is a strongly anticholinergic 

medication however the benefits of the medication appear necessary and 

continuing this medication is recommended at this time.  Clompiramine is 

currently utilized to manage OCD.  To replace clompiramine we will start 

Citalopram (least anticholinergic) antidepressant. Concern for discontinuation 

syndrome from abrupt discontinuation of TCA antidepressant as well. 

Glycopyyrolate is used for management of sialorrhea secondary to Clozaril. It is

not pertinent to controlling psychiatric symptoms and would not need replacement

at this time.  


Agree to continue Lamictal and Lithium."


-In addition to the recommendations above, please note the following 

recommendations from the FDA regarding Clozapine prescribing:


--Avoid co-prescribing clozapine with other anticholinergic medicines that can 

cause gastrointestinal hypomotility.


--Consider prophylactic use of laxatives.


--Encourage appropriate hydration, physical activity, and foods that are high in

fiber.


-Patient does not require 1:1 sitter for safety


-Patient is recommended to follow up with his [facility] team in the outpatient 

setting. He is cleared psychiatrically for discharge.


-Psychiatry will follow loosely, please contact with any questions.

## 2023-03-13 NOTE — P.PN
Subjective


Progress Note Date: 03/13/23





Hospital course:





Patient is a very pleasant 57-year-old male with a past medical history of type 

II non-insulin-dependent diabetes mellitus, hypertension, hyperlipidemia, GERD, 

schizoaffective disorder, anxiety, bipolar, depression, and previous inguinal 

hernia with repair.  Presented to the emergency department with a chief 

complaint of abdominal/epigastric/chest pain, nausea, vomiting, abdominal 

distention and constipation 2 weeks.  Patient reports vomiting dark black 

coffee-colored emesis and inability to have a bowel movement 2 weeks.  He 

reports pain institute diffuse abdomen radiating up into his epigastric region 

and chest.  He denies having any fevers, chills, palpitations, shortness of 

breath, cough or congestion, hemoptysis, melena, or hematochezia.  Patient 

reports decreased appetite and inability to hold down oral intake.  He does 

report decreased urinary output, but denies any retention or difficulties 

initiating a stream.  Reports concerns of dehydration.  Patient underwent full 

evaluation in the emergency department.  Labs completed and reviewed.  He was 

found to have leukocytosis with WBC count of 18.1 and normocytic anemia with 

hemoglobin of 12.6.  Coagulation profile normal findings.  BMP revealing 

hyponatremia with sodium 132 and hypochloremia with chloride 95 and acute kidney

injury with BUN 59, creatinine 2.11, and GFR of 34 as well as hyperglycemia with

glucose of 252.  Troponin less than 0.012.  EKG showing sinus tachycardia at 102

bpm with T-wave inversion in lateral leads I and aVL, T-wave inversion is new 

finding when compared to previous EKG completed 2/3/20.  Chest x-ray completed 

and report reviewed stating limited inspiration with bilateral consolidation and

right basilar atelectasis or infiltrate.  CT abdomen and pelvis without contrast

completed and radiology report reviewed showing dilated small bowel, 

cecum/ascending colon with transition point within the proximal third of the 

transverse colon with focal narrowing in none distention of the remainder of the

colon, findings possibly representing stricture versus mass, small bowel 

dilation with small bowel feces throughout suggesting delayed transit/fecal 

stasis and dilated stomach and esophagus filled with ingested contents.  

Discussed these  findings with the ED physicianin detail and patient was 

accepted for admission to general surgery unit with telemetry under our services

with consultation to general surgery.  Patient taken for colectomy on 3/7/23.  

Awaiting for return of bowel function.  Repeat abdominal x-ray completed on 

3/12/23 reviewed and radiology report showing nonspecific bowel gas pattern 

without radiographic evidence for acute process with persistent few gaseous 

dilated loops of bowel remaining. 





Physical exam:





Patient seen and fully evaluated at bedside this morning.  He is postoperative 

day 6  patient much more awake today.  He was advanced to a full liquid diet thi

s morning.  He reports passing flatus but denies having any bowel movements at 

this time.  Patient reports mild abdominal pain/discomfort but denies having any

nausea or vomiting.





Vital signs reviewed and stable. 


General: Nontoxic, no distress and appears stated age.  


Derm: Skin warm and dry, normal coloration for ethnicity.


Head: Atraumatic, normocephalic and symmetric.  


Eyes: EOMs intact, no lid lag, and anicteric sclera


Mouth: no lip lesions, mucus membranes moist


Cardiovascular: regular rate and rhythm with normal S1S2, no murmur, positive 

posterior tibial pulses bilaterally, and cap refill < 2 seconds.  


Lungs: Respirations even, regular, and unlabored on room air. Lungs CTA bilate

rally, no rhonchi, no rales, no wheezing, and no accessory muscle usage. 


Abdominal: Abdomen soft and slightly distended, dressing intact midline and 

wound VAC in place.


Ext: ROM intact. No gross muscle atrophy, no edema, no contractures


Neuro: Speech clear, face symmetrical and CN II-XII grossly intact with no noted

focal neuro deficits


Psych: Alert and oriented to person, place, time, and situation. Appropriate and

pleasant affect. 





Assessment and Plan of Care:





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


Hypokalemia


-Morning labs reviewed showing.  BMP revealing resolution of hypernatremia.  

Sodium was monitored every 6 hours decreasing from 150 down to 148 and this 

morning it is 144 with D5W infusion.


-Patient did receive 1 dose of DDAVP and had successful decrease in urinary 

output to 4525 mL over the past 24 hours..


-Nephrology following, recommending continuing of D5W and repeating a second 

dose of DDAVP.


-Order placed for K-dur 40 mEq 1 dose for morning potassium of 3.0.


-Lithium remains discontinued and reconsulted psychiatry at this time for 

assistance with medication recommendations for replacement.


-Followed up on repeat lithium level and it was therapeutic at 0.7.  Urine 

osmolality was 215 and random urine sodium was 93.


-Continue point-of-care glucose every 4 hours 


-Order placed for repeat BMP tomorrow morning.





Small bowel obstruction, status post right colectomy and partial omentectomy on 

3/7/23, postoperative day 6


Dilated proximal transverse right colon


Acute Postoperative blood loss anemia, expected finding and stable


-Labs completed and reviewed.  CBC showing stable normocytic anemia with 

hemoglobin of 9.2 and resolution of leukocytosis with WBC count of 8.8. No need 

for transfusion at this time.


-Gen. surgery following and discussed plan of care with general surgery PA 

stating patient to remain on full liquids until has bowel movement and starting 

patient on lactulose 30 mg twice daily.


-Continue with Protonix 40 mg IVP twice daily for GI prophylaxis at this time.


-Order placed for repeat morning CBC to follow up on normocytic anemia to 

continue to monitor for improvement/resolution and follow up with mild 

leukocytosis.


-Patient completed 7 day course of IV antibiotics cefepime and Flagyl on 3/12/23


-Management of post surgical wound/wound VAC per general surgery team.





EKG changes


-EKG showing sinus tachycardia at 102 bpm with T-wave inversion in lateral leads

I and aVL, T-wave inversion is new finding when compared to previous EKG 

completed 2/3/20 upon personal review and interpretation of both.


-Heart score 3. with risk of MACE 0.9-1.7%.  Troponins were trended overnight 

all resulting at less than 0.0123 draws.  Patient asymptomatic at this time.  

Therefore recommending no need for further cardiac testing or evaluation at this

time.





Diabetes mellitus with hyperglycemia


-Continue glycemic protocol with NovoLog sliding scale every 4 hours while 

patient remains nothing by mouth.  The glucose levels have ranged from 148-205 

over the past 24 hours.





Schizoaffective disorder


Anxiety and depression


Obsessive compulsive disorder


Bipolar disorder


-Continue patient's home medication regimen with Lamictal and Clozaril.  Lithium

discontinued secondary to development of diabetes insipidus.


-Discontinued Glycopyrrolate and clomipramine.


-Psychiatry following, documentation reviewed, psychiatrist placed patient on 

citalopram 10 mg daily to replace clomipramine and no need to replace 

Glycopyrrolate


-Lithium level therapeutic at 0.8 with repeat lithium level of 0.7.





Resolved:


Acute kidney injury, secondary to dehydration.  Resolved.


Hyponatremia, resolved


Hypochloremia, resolved








CODE STATUS: Full code


DVT prophylaxis: SCDs


Discussed with: Patient, nephrologist, general surgeon PA and RN


Anticipated discharge date: Clinical course to determine


Anticipated discharge place: Home





Patient was seen independently by nurse practitioner.





This document was prepared using Dragon dictation software.  Please allow for 

errors in transcription while rare they do occur.








I reviewed the documentation as provided by the KAMAR above, who is the original 

author of this note.  I agree with the documented assessment and plan, with the 

following changes: none





Objective





- Vital Signs


Vital signs: 


                                   Vital Signs











Temp  97.9 F   03/13/23 04:00


 


Pulse  79   03/13/23 04:00


 


Resp  18   03/13/23 04:00


 


BP  142/81   03/13/23 04:00


 


Pulse Ox  93 L  03/13/23 04:00


 


FiO2      








                                 Intake & Output











 03/12/23 03/13/23 03/13/23





 18:59 06:59 18:59


 


Intake Total 480 2700 


 


Output Total 2525 2000 


 


Balance -2045 700 


 


Weight  97.5 kg 


 


Intake:   


 


  IV  200 


 


    Cefepime 2 gm In Sodium  100 





    Chloride 0.9% 100 ml @ 25   





    mls/hr IVPB Q8HR [facility] Rx#   





    :[id number]   


 


    metroNIDAZOLE-NS   100 





    mg In Saline 1 100ml.bag   





    @ 100 mls/hr IVPB Q8HR   





    [facility] Rx#:[id number]   


 


  Oral 480 2500 


 


Output:   


 


  Urine 2525 2000 


 


Other:   


 


  Voiding Method Indwelling Catheter Indwelling Catheter 














- Labs


CBC & Chem 7: 


                                 03/14/23 06:20





                                 03/14/23 06:20


Labs: 


                  Abnormal Lab Results - Last 24 Hours (Table)











  03/12/23 03/12/23 03/12/23 Range/Units





  06:07 11:16 14:28 


 


RBC     (4.30-5.90)  m/uL


 


Hgb     (13.0-17.5)  gm/dL


 


Hct     (39.0-53.0)  %


 


Sodium  150 H   147 H  (135-145)  mmol/L


 


Potassium     (3.5-5.1)  mmol/L


 


Chloride  121 H    ()  mmol/L


 


Carbon Dioxide  19.6 L    (20.0-27.5)  mmol/L


 


Anion Gap  9.50 L    (10.00-18.00)  mmol/L


 


BUN     (9-20)  mg/dL


 


Glucose  135 H    ()  mg/dL


 


POC Glucose (mg/dL)   158 H   ()  mg/dL


 


Calcium  8.6 L    (8.7-10.3)  mg/dL


 


Total Bilirubin  <0.15 L    (0.30-1.20)  mg/dL


 


AST  39 H    (14-35)  U/L


 


Total Protein  5.0 L    (6.2-8.2)  g/dL


 


Albumin  2.8 L    (3.8-4.9)  g/dL


 


Albumin/Globulin Ratio  1.33 L    (1.60-3.17)  g/dL














  03/12/23 03/12/23 03/12/23 Range/Units





  16:27 18:24 20:47 


 


RBC     (4.30-5.90)  m/uL


 


Hgb     (13.0-17.5)  gm/dL


 


Hct     (39.0-53.0)  %


 


Sodium   146 H   (135-145)  mmol/L


 


Potassium     (3.5-5.1)  mmol/L


 


Chloride     ()  mmol/L


 


Carbon Dioxide     (20.0-27.5)  mmol/L


 


Anion Gap     (10.00-18.00)  mmol/L


 


BUN     (9-20)  mg/dL


 


Glucose     ()  mg/dL


 


POC Glucose (mg/dL)  184 H   205 H  ()  mg/dL


 


Calcium     (8.7-10.3)  mg/dL


 


Total Bilirubin     (0.30-1.20)  mg/dL


 


AST     (14-35)  U/L


 


Total Protein     (6.2-8.2)  g/dL


 


Albumin     (3.8-4.9)  g/dL


 


Albumin/Globulin Ratio     (1.60-3.17)  g/dL














  03/13/23 03/13/23 03/13/23 Range/Units





  00:45 04:09 05:58 


 


RBC    3.11 L  (4.30-5.90)  m/uL


 


Hgb    9.2 L  (13.0-17.5)  gm/dL


 


Hct    27.5 L  (39.0-53.0)  %


 


Sodium     (135-145)  mmol/L


 


Potassium     (3.5-5.1)  mmol/L


 


Chloride     ()  mmol/L


 


Carbon Dioxide     (20.0-27.5)  mmol/L


 


Anion Gap     (10.00-18.00)  mmol/L


 


BUN     (9-20)  mg/dL


 


Glucose     ()  mg/dL


 


POC Glucose (mg/dL)  162 H  163 H   ()  mg/dL


 


Calcium     (8.7-10.3)  mg/dL


 


Total Bilirubin     (0.30-1.20)  mg/dL


 


AST     (14-35)  U/L


 


Total Protein     (6.2-8.2)  g/dL


 


Albumin     (3.8-4.9)  g/dL


 


Albumin/Globulin Ratio     (1.60-3.17)  g/dL














  03/13/23 03/13/23 Range/Units





  05:58 06:31 


 


RBC    (4.30-5.90)  m/uL


 


Hgb    (13.0-17.5)  gm/dL


 


Hct    (39.0-53.0)  %


 


Sodium    (135-145)  mmol/L


 


Potassium  3.0 L   (3.5-5.1)  mmol/L


 


Chloride  113 H   ()  mmol/L


 


Carbon Dioxide    (20.0-27.5)  mmol/L


 


Anion Gap    (10.00-18.00)  mmol/L


 


BUN  7 L   (9-20)  mg/dL


 


Glucose  153 H   ()  mg/dL


 


POC Glucose (mg/dL)   177 H  ()  mg/dL


 


Calcium  8.1 L   (8.7-10.3)  mg/dL


 


Total Bilirubin    (0.30-1.20)  mg/dL


 


AST    (14-35)  U/L


 


Total Protein  5.1 L   (6.2-8.2)  g/dL


 


Albumin  2.6 L   (3.8-4.9)  g/dL


 


Albumin/Globulin Ratio    (1.60-3.17)  g/dL

## 2023-03-13 NOTE — P.NPCON
History of Present Illness





- Reason for Consult


hypernatremia





- History of Present Illness





Patient is a 57 yr old male with h/o HTN, DM type 2, admitted with c/o abdominal

pain. Pt is s/p right colectomy and omentectomy on 3/7/23


Patient was maintained on lithium as well.


Post operatively patient had polyuria with up to 7 L of urine documented for 24 

hrs, Sodium was also elevated.


Received 1 dose of DDAVP yesterday and maintained on D5W.


Sodium is 144 from 150


Cr 0.7, 24 hr UOP 4.5L last 24 hrs.


No recent hypotension noted.





Review of Systems





as per HPI





Past Medical History


Past Medical History: Diabetes Mellitus, GERD/Reflux, Hyperlipidemia, 

Hypertension, Sleep Apnea/CPAP/BIPAP


Additional Past Medical History / Comment(s): Family history of premature 

coronary artery disease. OCD.


History of Any Multi-Drug Resistant Organisms: None Reported


Past Surgical History: Hernia Repair


Past Anesthesia/Blood Transfusion Reactions: No Reported Reaction


Past Psychological History: Anxiety, Bipolar, Depression, Schizoaffective 

Disorder


Smoking Status: Never smoker


Past Alcohol Use History: None Reported


Past Drug Use History: None Reported





- Past Family History


  ** Father


Family Medical History: Congestive Heart Failure (CHF), Coronary Artery Disease 

(CAD), Diabetes Mellitus





  ** Mother


Family Medical History: Cancer





Medications and Allergies


                                Home Medications











 Medication  Instructions  Recorded  Confirmed  Type


 


Glycopyrrolate [Robinul Forte] 2 mg PO BID 30 Days #60 tab 02/17/20 03/06/23 Rx


 


Levothyroxine Sodium [Synthroid] 50 mcg PO DAILY 30 Days #30 tab 02/17/20 03/06/23 Rx


 


Lithium Carbonate 1,200 mg PO HS 30 Days #120 cap 02/17/20 03/06/23 Rx


 


Rosuvastatin Calcium [Crestor] 5 mg PO HS 30 Days #30 tab 02/17/20 03/06/23 Rx


 


cloZAPine [Clozaril] 150 mg PO HS 30 Days #45 tab 02/17/20 03/06/23 Rx


 


Cetirizine HCl [Zyrtec] 10 mg PO DAILY 05/04/21 03/06/23 History


 


Meclizine [Antivert] 25 mg PO BID 05/04/21 03/06/23 History


 


clomiPRAMINE [Anafranil] 100 mg PO BID 05/04/21 03/06/23 History


 


lamoTRIgine [LaMICtal] 150 mg PO BID 05/04/21 03/06/23 History


 


Linagliptin [Tradjenta] 5 mg PO DAILY 03/06/23 03/06/23 History


 


Omeprazole [PriLOSEC] 40 mg PO DAILY 03/06/23 03/06/23 History


 


Propranolol HCl [Propranolol HCl 80 mg PO DAILY 03/06/23 03/06/23 History





ER]    


 


amLODIPine [Norvasc] 10 mg PO DAILY 03/06/23 03/06/23 History








                                    Allergies











Allergy/AdvReac Type Severity Reaction Status Date / Time


 


Penicillins Allergy  Unknown Verified 03/06/23 12:13





   Childhood  


 


risperidone [From Risperdal] Allergy  Unknown Verified 03/06/23 12:13


 


divalproex sodium AdvReac  Anxiety, Verified 03/06/23 12:13





[From Depakote]   weight gain  














Physical Exam


Vitals: 


                                   Vital Signs











  Temp Pulse Resp BP BP Pulse Ox


 


 03/13/23 12:00  97.6 F  77  20   111/73  99


 


 03/13/23 09:00       96


 


 03/13/23 08:00  98 F  87  20  114/76   98


 


 03/13/23 04:00  97.9 F  79  18  142/81   93 L


 


 03/13/23 00:00   71  15  130/87   98


 


 03/12/23 20:00  97.6 F  80  16  138/80   96








                                Intake and Output











 03/13/23 03/13/23 03/13/23





 06:59 14:59 22:59


 


Intake Total 2700 600 


 


Output Total 2000 2000 


 


Balance 700 -1400 


 


Intake:   


 


    


 


    Cefepime 2 gm In Sodium 100  





    Chloride 0.9% 100 ml @ 25   





    mls/hr IVPB Q8HR [facility] Rx#   





    :[id number]   


 


    metroNIDAZOLE-NS  100  





    mg In Saline 1 100ml.bag   





    @ 100 mls/hr IVPB Q8HR   





    [facility] Rx#:[id number]   


 


  Oral 2500 600 


 


Output:   


 


  Urine 2000 2000 


 


Other:   


 


  Voiding Method Indwelling Catheter Indwelling Catheter Indwelling Catheter


 


  # Bowel Movements  1 


 


  Weight 97.5 kg 97.5 kg 














Awake, comfortable, no acute distress.


Lungs are clear


CVS S1 and S2


Abdomen is soft, mild tenderness


Lower extremities show no edema


CNS exam is grossly intact.





Results





- Lab Results


                             Most recent lab results











Calcium  8.1 mg/dL (8.4-10.2)  L  03/13/23  05:58    


 


Magnesium  2.2 mg/dL (1.6-2.3)   03/13/23  05:58    














                                 03/13/23 05:58





                                 03/13/23 05:58





Assessment and Plan


Assessment: 





1. Hypernatremia associated with polyuria and free water deficit most likely 

secondary to nephrogenic DI. Also a component of hyperglycemia. Improved with 1 

dose of DDAVP. Lithium is appropriately discontinued. Sodium has improved.


2. S/p omentectomy and right colectomy on 3/7/23


3. Hypokalemia, s/p replacement


4. Bipolar disorder on lithium, currently on hold.


Plan: 





Continue D5W. Repeat another dose of DDAVP.


Repeat sodium this evening.


Control blood sugars

## 2023-03-13 NOTE — P.PN
Subjective


Progress Note Date: 03/13/23





CHIEF COMPLAINT: Colonic obstruction





HISTORY OF PRESENT ILLNESS: Patient is postop day #6 status post right colectomy

and partial omentectomy for possible stricture of the transverse colon and 

massive dilated proximal transverse right colon and small bowel.  Patient lying 

comfortably.  He is having flatus.  He reports his pain is controlled.  Denies 

any nausea or vomiting.  He's currently on a low fiber diet.  Afebrile.  WBC did

go down from 11-8.8 over 9.2 platelets 303 Na 144 potassium 3.0 creatinine 0.76 

and magnesium 2.2 abdominal x-ray nonspecific bowel gas pattern without x-ray 

evidence for acute process with persistent few gas shows dilated loops of bowel.

 Nephrology consulted for possible diabetes insipidus.





Patient seen and examined with Dr. Mcgowan





PHYSICAL EXAM: 


VITAL SIGNS: Reviewed.


GENERAL: Well-developed in no acute distress. 


HEENT:  No sclera icterus. Extraocular movements grossly intact.  Moist buccal 

mucosa. Head is atraumatic, normocephalic. 


ABDOMEN:  Soft.  Distended.  nontender Prevana wound VAC in place and is clean, 

dry and intact


NEUROLOGIC: lethargic but arousable





ASSESSMENT: 


1.  Massively dilated proximal transverse right colon and small bowel with pos

sible stricture of the transverse colon status post right colectomy and partial 

omentectomy


2.  Hypokalemia





PLAN: 


-Downgrade diet to full liquids until patient has BM


-Ordered lactulose 30ml BID


-Medicine replacing potassium


-Encouraged patient to ambulate


-Consult placed for  for possible ECF placement


-Continue supportive care


-Encouraged patient to use incentive spirometer


-GI prophylaxis Protonix and DVT prophylaxis subcu heparin





Physician Assistant note has been reviewed by physician. Signing provider agrees

with the documented findings, assessment, and plan of care. 





Objective





- Vital Signs


Vital signs: 


                                   Vital Signs











Temp  97.9 F   03/13/23 04:00


 


Pulse  79   03/13/23 04:00


 


Resp  18   03/13/23 04:00


 


BP  142/81   03/13/23 04:00


 


Pulse Ox  96   03/13/23 09:00


 


FiO2      








                                 Intake & Output











 03/12/23 03/13/23 03/13/23





 18:59 06:59 18:59


 


Intake Total 480 2700 120


 


Output Total 2525 2000 


 


Balance -2045 700 120


 


Weight  97.5 kg 


 


Intake:   


 


  IV  200 


 


    Cefepime 2 gm In Sodium  100 





    Chloride 0.9% 100 ml @ 25   





    mls/hr IVPB Q8HR [facility] Rx#   





    :[id number]   


 


    metroNIDAZOLE-NS   100 





    mg In Saline 1 100ml.bag   





    @ 100 mls/hr IVPB Q8HR   





    [facility] Rx#:[id number]   


 


  Oral 480 2500 120


 


Output:   


 


  Urine 2525 2000 


 


Other:   


 


  Voiding Method Indwelling Catheter Indwelling Catheter 














- Labs


CBC & Chem 7: 


                                 03/13/23 05:58





                                 03/13/23 05:58


Labs: 


                  Abnormal Lab Results - Last 24 Hours (Table)











  03/12/23 03/12/23 03/12/23 Range/Units





  11:16 14:28 16:27 


 


RBC     (4.30-5.90)  m/uL


 


Hgb     (13.0-17.5)  gm/dL


 


Hct     (39.0-53.0)  %


 


Sodium   147 H   (137-145)  mmol/L


 


Potassium     (3.5-5.1)  mmol/L


 


Chloride     ()  mmol/L


 


BUN     (9-20)  mg/dL


 


Glucose     (74-99)  mg/dL


 


POC Glucose (mg/dL)  158 H   184 H  ()  mg/dL


 


Calcium     (8.4-10.2)  mg/dL


 


Total Protein     (6.3-8.2)  g/dL


 


Albumin     (3.5-5.0)  g/dL














  03/12/23 03/12/23 03/13/23 Range/Units





  18:24 20:47 00:45 


 


RBC     (4.30-5.90)  m/uL


 


Hgb     (13.0-17.5)  gm/dL


 


Hct     (39.0-53.0)  %


 


Sodium  146 H    (137-145)  mmol/L


 


Potassium     (3.5-5.1)  mmol/L


 


Chloride     ()  mmol/L


 


BUN     (9-20)  mg/dL


 


Glucose     (74-99)  mg/dL


 


POC Glucose (mg/dL)   205 H  162 H  ()  mg/dL


 


Calcium     (8.4-10.2)  mg/dL


 


Total Protein     (6.3-8.2)  g/dL


 


Albumin     (3.5-5.0)  g/dL














  03/13/23 03/13/23 03/13/23 Range/Units





  04:09 05:58 05:58 


 


RBC   3.11 L   (4.30-5.90)  m/uL


 


Hgb   9.2 L   (13.0-17.5)  gm/dL


 


Hct   27.5 L   (39.0-53.0)  %


 


Sodium     (137-145)  mmol/L


 


Potassium    3.0 L  (3.5-5.1)  mmol/L


 


Chloride    113 H  ()  mmol/L


 


BUN    7 L  (9-20)  mg/dL


 


Glucose    153 H  (74-99)  mg/dL


 


POC Glucose (mg/dL)  163 H    ()  mg/dL


 


Calcium    8.1 L  (8.4-10.2)  mg/dL


 


Total Protein    5.1 L  (6.3-8.2)  g/dL


 


Albumin    2.6 L  (3.5-5.0)  g/dL














  03/13/23 Range/Units





  06:31 


 


RBC   (4.30-5.90)  m/uL


 


Hgb   (13.0-17.5)  gm/dL


 


Hct   (39.0-53.0)  %


 


Sodium   (137-145)  mmol/L


 


Potassium   (3.5-5.1)  mmol/L


 


Chloride   ()  mmol/L


 


BUN   (9-20)  mg/dL


 


Glucose   (74-99)  mg/dL


 


POC Glucose (mg/dL)  177 H  ()  mg/dL


 


Calcium   (8.4-10.2)  mg/dL


 


Total Protein   (6.3-8.2)  g/dL


 


Albumin   (3.5-5.0)  g/dL

## 2023-03-14 LAB
ALBUMIN SERPL-MCNC: 2.4 G/DL (ref 3.5–5)
ALP SERPL-CCNC: 62 U/L (ref 38–126)
ALT SERPL-CCNC: 25 U/L (ref 4–49)
ANION GAP SERPL CALC-SCNC: 5 MMOL/L
AST SERPL-CCNC: 26 U/L (ref 17–59)
BUN SERPL-SCNC: 7 MG/DL (ref 9–20)
CALCIUM SPEC-MCNC: 8.2 MG/DL (ref 8.4–10.2)
CHLORIDE SERPL-SCNC: 115 MMOL/L (ref 98–107)
CO2 SERPL-SCNC: 22 MMOL/L (ref 22–30)
ERYTHROCYTE [DISTWIDTH] IN BLOOD BY AUTOMATED COUNT: 3.07 M/UL (ref 4.3–5.9)
ERYTHROCYTE [DISTWIDTH] IN BLOOD: 14.7 % (ref 11.5–15.5)
GLUCOSE BLD-MCNC: 165 MG/DL (ref 70–110)
GLUCOSE BLD-MCNC: 197 MG/DL (ref 70–110)
GLUCOSE BLD-MCNC: 213 MG/DL (ref 70–110)
GLUCOSE BLD-MCNC: 220 MG/DL (ref 70–110)
GLUCOSE SERPL-MCNC: 135 MG/DL (ref 74–99)
HCT VFR BLD AUTO: 27.3 % (ref 39–53)
HGB BLD-MCNC: 8.9 GM/DL (ref 13–17.5)
MAGNESIUM SPEC-SCNC: 2.1 MG/DL (ref 1.6–2.3)
MCH RBC QN AUTO: 28.9 PG (ref 25–35)
MCHC RBC AUTO-ENTMCNC: 32.6 G/DL (ref 31–37)
MCV RBC AUTO: 88.7 FL (ref 80–100)
PLATELET # BLD AUTO: 322 K/UL (ref 150–450)
POTASSIUM SERPL-SCNC: 3.6 MMOL/L (ref 3.5–5.1)
PROT SERPL-MCNC: 4.7 G/DL (ref 6.3–8.2)
SODIUM SERPL-SCNC: 142 MMOL/L (ref 137–145)
WBC # BLD AUTO: 16.7 K/UL (ref 3.8–10.6)

## 2023-03-14 RX ADMIN — PANTOPRAZOLE SODIUM SCH MG: 40 INJECTION, POWDER, FOR SOLUTION INTRAVENOUS at 20:52

## 2023-03-14 RX ADMIN — PROPRANOLOL HYDROCHLORIDE SCH MG: 80 CAPSULE, EXTENDED RELEASE ORAL at 08:48

## 2023-03-14 RX ADMIN — INSULIN ASPART SCH UNIT: 100 INJECTION, SOLUTION INTRAVENOUS; SUBCUTANEOUS at 12:19

## 2023-03-14 RX ADMIN — DOCUSATE SODIUM SCH MG: 100 CAPSULE, LIQUID FILLED ORAL at 08:48

## 2023-03-14 RX ADMIN — POTASSIUM CHLORIDE SCH: 14.9 INJECTION, SOLUTION INTRAVENOUS at 17:06

## 2023-03-14 RX ADMIN — HEPARIN SODIUM SCH UNIT: 5000 INJECTION INTRAVENOUS; SUBCUTANEOUS at 20:51

## 2023-03-14 RX ADMIN — CEFEPIME HYDROCHLORIDE SCH MLS/HR: 2 INJECTION, POWDER, FOR SOLUTION INTRAVENOUS at 17:50

## 2023-03-14 RX ADMIN — INSULIN ASPART SCH UNIT: 100 INJECTION, SOLUTION INTRAVENOUS; SUBCUTANEOUS at 16:49

## 2023-03-14 RX ADMIN — PANTOPRAZOLE SODIUM SCH MG: 40 INJECTION, POWDER, FOR SOLUTION INTRAVENOUS at 08:48

## 2023-03-14 RX ADMIN — LACTULOSE SCH GM: 20 SOLUTION ORAL at 20:52

## 2023-03-14 RX ADMIN — DOCUSATE SODIUM SCH MG: 100 CAPSULE, LIQUID FILLED ORAL at 20:52

## 2023-03-14 RX ADMIN — CITALOPRAM HYDROBROMIDE SCH MG: 10 TABLET ORAL at 08:48

## 2023-03-14 RX ADMIN — TAMSULOSIN HYDROCHLORIDE SCH MG: 0.4 CAPSULE ORAL at 08:48

## 2023-03-14 RX ADMIN — HEPARIN SODIUM SCH UNIT: 5000 INJECTION INTRAVENOUS; SUBCUTANEOUS at 08:48

## 2023-03-14 RX ADMIN — INSULIN ASPART SCH UNIT: 100 INJECTION, SOLUTION INTRAVENOUS; SUBCUTANEOUS at 20:52

## 2023-03-14 RX ADMIN — METRONIDAZOLE SCH MLS/HR: 500 INJECTION, SOLUTION INTRAVENOUS at 23:41

## 2023-03-14 RX ADMIN — ATORVASTATIN CALCIUM SCH MG: 10 TABLET, FILM COATED ORAL at 20:52

## 2023-03-14 RX ADMIN — METRONIDAZOLE SCH MLS/HR: 500 INJECTION, SOLUTION INTRAVENOUS at 16:49

## 2023-03-14 RX ADMIN — INSULIN ASPART SCH: 100 INJECTION, SOLUTION INTRAVENOUS; SUBCUTANEOUS at 05:55

## 2023-03-14 RX ADMIN — LACTULOSE SCH: 20 SOLUTION ORAL at 08:49

## 2023-03-14 RX ADMIN — LEVOTHYROXINE SODIUM SCH MCG: 50 TABLET ORAL at 05:57

## 2023-03-14 NOTE — P.PN
Progress Note - Text


Progress Note Date: 03/14/23





Interval history: 


Patient was seen resting in bed and was directable and agreeable to speak with 

writer. Currently the patient is reporting he is feeling better. He states he 

has been passing "a lot" of stool. He states he wants to be able to participate 

in more physical therapy and expresses a desire to walk around as it helps him 

feel better. He is not endorsing any suicidal or homicidal ideation, intention, 

and/or plan. He is not reporting any paranoia or other delusions. He continues 

to experience auditory hallucinations that are demeaning to him and at times 

commands him however he is able to ignore this. He is agreeable to titration of 

his medication due to the discontinuation of his lithium. He expresses no 

concerns to this provider.  





Mental status exam: 


General Appearance: Patient appears to be stated age is alert, directable, and 

cooperative.


Behavior: No agitated behavior. Patient is calm and directable.


Speech: Patient's speech is fluent and non-pressured. 


Mood/Affect: Mood is good, affect is congruent and at times bright.


Suicidality/Homicidality: Patient denies having any suicidal or homicidal 

ideation intent or plan.  


Perceptions: He reports chronic auditory hallucinations, but he has learned to 

ignore them. He denies visual hallucinations. 


Though content/process: No delusional thought content on my assessment, and 

thought process is organized.


Memory and concentration: AOX3, grossly intact for the purposes of this session


Judgment and insight: average





                                   Vital Signs











Temp  98.1 F   03/14/23 12:00


 


Pulse  70   03/14/23 12:00


 


Resp  14   03/14/23 12:00


 


BP  115/77   03/14/23 12:00


 


Pulse Ox  98   03/14/23 12:00


 


FiO2      








                                 Intake & Output











 03/13/23 03/14/23 03/14/23





 18:59 06:59 18:59


 


Intake Total 1880  1200


 


Output Total 2000 450 350


 


Balance -120 -450 850


 


Weight 97.5 kg 94 kg 


 


Intake:   


 


    


 


    Cefepime 2 gm In Sodium 100  





    Chloride 0.9% 100 ml @ 25   





    mls/hr IVPB Q8HR [facility] Rx#   





    :[id number]   


 


    metroNIDAZOLE-NS  100  





    mg In Saline 1 100ml.bag   





    @ 100 mls/hr IVPB Q8HR   





    [facility] Rx#:[id number]   


 


  Intake, IV Titration 900  160





  Amount   


 


    Dextrose 5% in Water 1, 900  





    000 ml @ 125 mls/hr IV .   





    Q8H [facility] Rx#:[id number]   


 


    Lactated Ringers 1,000 ml   160





    @ 0 mls/hr IV .[facility]   





    [facility] Rx#:[id number]   


 


  Oral 780  1040


 


Output:   


 


  Urine 2000 450 350


 


Other:   


 


  Voiding Method Indwelling Catheter Urinal Urinal


 


  # Voids 1  


 


  # Bowel Movements 1 2 








                       Laboratory Results - Last 24 Hours











  03/13/23 03/13/23 03/13/23





  16:30 19:45 23:47


 


WBC   


 


RBC   


 


Hgb   


 


Hct   


 


MCV   


 


MCH   


 


MCHC   


 


RDW   


 


Plt Count   


 


MPV   


 


Hypochromasia   


 


Poikilocytosis   


 


Sodium   


 


Potassium   


 


Chloride   


 


Carbon Dioxide   


 


Anion Gap   


 


BUN   


 


Creatinine   


 


Est GFR (CKD-EPI)AfAm   


 


Est GFR (CKD-EPI)NonAf   


 


Glucose   


 


POC Glucose (mg/dL)  242 H  205 H  177 H


 


POC Glu Operater ID  Schoenberg, Donna Balwinski, Robert Edgerton, Brooke


 


Calcium   


 


Magnesium   


 


Total Bilirubin   


 


AST   


 


ALT   


 


Alkaline Phosphatase   


 


Total Protein   


 


Albumin   














  03/14/23 03/14/23 03/14/23





  05:51 06:20 06:20


 


WBC   16.7 H 


 


RBC   3.07 L 


 


Hgb   8.9 L 


 


Hct   27.3 L 


 


MCV   88.7 


 


MCH   28.9 


 


MCHC   32.6 


 


RDW   14.7 


 


Plt Count   322 


 


MPV   6.8 


 


Hypochromasia   Slight 


 


Poikilocytosis   Slight 


 


Sodium    142


 


Potassium    3.6


 


Chloride    115 H


 


Carbon Dioxide    22


 


Anion Gap    5


 


BUN    7 L


 


Creatinine    0.93


 


Est GFR (CKD-EPI)AfAm    >90


 


Est GFR (CKD-EPI)NonAf    >90


 


Glucose    135 H


 


POC Glucose (mg/dL)  165 H  


 


POC Glu Operater ID  Emery Ivy  


 


Calcium    8.2 L


 


Magnesium    2.1


 


Total Bilirubin    0.2


 


AST    26


 


ALT    25


 


Alkaline Phosphatase    62


 


Total Protein    4.7 L


 


Albumin    2.4 L














  03/14/23





  11:42


 


WBC 


 


RBC 


 


Hgb 


 


Hct 


 


MCV 


 


MCH 


 


MCHC 


 


RDW 


 


Plt Count 


 


MPV 


 


Hypochromasia 


 


Poikilocytosis 


 


Sodium 


 


Potassium 


 


Chloride 


 


Carbon Dioxide 


 


Anion Gap 


 


BUN 


 


Creatinine 


 


Est GFR (CKD-EPI)AfAm 


 


Est GFR (CKD-EPI)NonAf 


 


Glucose 


 


POC Glucose (mg/dL)  197 H


 


POC Glu Operater ID  Kathleen Ornelas


 


Calcium 


 


Magnesium 


 


Total Bilirubin 


 


AST 


 


ALT 


 


Alkaline Phosphatase 


 


Total Protein 


 


Albumin 











IMPRESSIONS: 


Small bowel obstruction


Schizoaffective disorder, depressive type


Obsessive-compulsive disorder


Alcohol use disorder, in sustained remission





PLAN: 


-Continue your medical and surgical management


-At this time patient DOES NOT meet criteria for inpatient psychiatric 

admission. The patient is currently not presenting with any acute symptoms of 

psychosis or rigoberto. He is not endorsing any suicidal or homicidal ideation, 

intention, and/or plan.  


- Increase Clozapine to 175 mg at bedtime after review of Clozapine levels. 

Patient is also passing stool. This is also to address discontinuation of 

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


- Continue Lamictal


- Increase Citalopram to 20 mg daily for depression/OCD/anxiety. 


-In addition to the recommendations above, please note the following 

recommendations from the FDA regarding Clozapine prescribing:


-Encourage appropriate hydration, physical activity, and foods that are high in 

fiber.


-Patient does not require 1:1 sitter for safety


-Patient is recommended to follow up with his [facility] team in the outpatient se

tting. He is cleared psychiatrically for discharge.


-Psychiatry will follow loosely, please contact with any questions.

## 2023-03-14 NOTE — P.PN
Subjective





Patient is seen for follow-up for hyponatremia secondary to diabetes insipidus, 

currently improved with discontinuation of lithium and 2 doses of DDAVP.


Status post D5W, currently off of IV fluids.


Sodium is at 142 today.





Objective





- Vital Signs


Vital signs: 


                                   Vital Signs











Temp  97.8 F   03/14/23 08:46


 


Pulse  50 L  03/14/23 08:46


 


Resp  14   03/14/23 08:46


 


BP  98/67   03/14/23 08:46


 


Pulse Ox  98   03/14/23 08:46


 


FiO2      








                                 Intake & Output











 03/13/23 03/14/23 03/14/23





 18:59 06:59 18:59


 


Intake Total 1880  1200


 


Output Total 2000 450 


 


Balance -120 -450 1200


 


Weight 97.5 kg 94 kg 


 


Intake:   


 


    


 


    Cefepime 2 gm In Sodium 100  





    Chloride 0.9% 100 ml @ 25   





    mls/hr IVPB Q8HR [facility] Rx#   





    :[id number]   


 


    metroNIDAZOLE-NS  100  





    mg In Saline 1 100ml.bag   





    @ 100 mls/hr IVPB Q8HR   





    [facility] Rx#:[id number]   


 


  Intake, IV Titration 900  160





  Amount   


 


    Dextrose 5% in Water 1, 900  





    000 ml @ 125 mls/hr IV .   





    [facility]H [facility] Rx#:[id number]   


 


    Lactated Ringers 1,000 ml   160





    @ 0 mls/hr IV .[facility]   





    [facility] Rx#:[id number]   


 


  Oral 780  1040


 


Output:   


 


  Urine 2000 450 


 


Other:   


 


  Voiding Method Indwelling Catheter Urinal Urinal


 


  # Voids 1  


 


  # Bowel Movements 1 2 














- Exam





Awake, comfortable, no acute distress.


Lungs are clear


CVS S1 and S2


Abdomen is soft, mild tenderness


Lower extremities show no edema


CNS exam is grossly intact.





- Labs


CBC & Chem 7: 


                                 03/14/23 06:20





                                 03/14/23 06:20


Labs: 


                  Abnormal Lab Results - Last 24 Hours (Table)











  03/13/23 03/13/23 03/13/23 Range/Units





  11:54 16:30 19:45 


 


WBC     (3.8-10.6)  k/uL


 


RBC     (4.30-5.90)  m/uL


 


Hgb     (13.0-17.5)  gm/dL


 


Hct     (39.0-53.0)  %


 


Chloride     ()  mmol/L


 


BUN     (9-20)  mg/dL


 


Glucose     (74-99)  mg/dL


 


POC Glucose (mg/dL)  261 H  242 H  205 H  ()  mg/dL


 


Calcium     (8.4-10.2)  mg/dL


 


Total Protein     (6.3-8.2)  g/dL


 


Albumin     (3.5-5.0)  g/dL














  03/13/23 03/14/23 03/14/23 Range/Units





  23:47 05:51 06:20 


 


WBC    16.7 H  (3.8-10.6)  k/uL


 


RBC    3.07 L  (4.30-5.90)  m/uL


 


Hgb    8.9 L  (13.0-17.5)  gm/dL


 


Hct    27.3 L  (39.0-53.0)  %


 


Chloride     ()  mmol/L


 


BUN     (9-20)  mg/dL


 


Glucose     (74-99)  mg/dL


 


POC Glucose (mg/dL)  177 H  165 H   ()  mg/dL


 


Calcium     (8.4-10.2)  mg/dL


 


Total Protein     (6.3-8.2)  g/dL


 


Albumin     (3.5-5.0)  g/dL














  03/14/23 Range/Units





  06:20 


 


WBC   (3.8-10.6)  k/uL


 


RBC   (4.30-5.90)  m/uL


 


Hgb   (13.0-17.5)  gm/dL


 


Hct   (39.0-53.0)  %


 


Chloride  115 H  ()  mmol/L


 


BUN  7 L  (9-20)  mg/dL


 


Glucose  135 H  (74-99)  mg/dL


 


POC Glucose (mg/dL)   ()  mg/dL


 


Calcium  8.2 L  (8.4-10.2)  mg/dL


 


Total Protein  4.7 L  (6.3-8.2)  g/dL


 


Albumin  2.4 L  (3.5-5.0)  g/dL














Assessment and Plan


Assessment: 





1. Hypernatremia associated with polyuria and free water deficit secondary to 

nephrogenic DI. Also a component of hyperglycemia. Improved with 2 doses of 

DDAVP. Lithium is appropriately discontinued. Sodium has improved.


2. S/p omentectomy and right colectomy on 3/7/23


3. Hypokalemia, s/p replacement


4. Bipolar disorder on lithium, currently on hold.


Plan: 





Continue off of lithium


Hold DDAVP today


Repeat sodium in a.m.


Encourage increased oral intake, particularly fluids


Control blood sugars

## 2023-03-14 NOTE — P.PN
Subjective


Progress Note Date: 03/14/23





Hospital course:





Patient is a very pleasant 57-year-old male with a past medical history of type 

II non-insulin-dependent diabetes mellitus, hypertension, hyperlipidemia, GERD, 

schizoaffective disorder, anxiety, bipolar, depression, and previous inguinal 

hernia with repair.  Presented to the emergency department with a chief 

complaint of abdominal/epigastric/chest pain, nausea, vomiting, abdominal 

distention and constipation 2 weeks.  Patient reports vomiting dark black 

coffee-colored emesis and inability to have a bowel movement 2 weeks.  He 

reports pain institute diffuse abdomen radiating up into his epigastric region 

and chest.  He denies having any fevers, chills, palpitations, shortness of 

breath, cough or congestion, hemoptysis, melena, or hematochezia.  Patient 

reports decreased appetite and inability to hold down oral intake.  He does 

report decreased urinary output, but denies any retention or difficulties 

initiating a stream.  Reports concerns of dehydration.  Patient underwent full 

evaluation in the emergency department.  Labs completed and reviewed.  He was 

found to have leukocytosis with WBC count of 18.1 and normocytic anemia with 

hemoglobin of 12.6.  Coagulation profile normal findings.  BMP revealing 

hyponatremia with sodium 132 and hypochloremia with chloride 95 and acute kidney

injury with BUN 59, creatinine 2.11, and GFR of 34 as well as hyperglycemia with

glucose of 252.  Troponin less than 0.012.  EKG showing sinus tachycardia at 102

bpm with T-wave inversion in lateral leads I and aVL, T-wave inversion is new 

finding when compared to previous EKG completed 2/3/20.  Chest x-ray completed 

and report reviewed stating limited inspiration with bilateral consolidation and

right basilar atelectasis or infiltrate.  CT abdomen and pelvis without contrast

completed and radiology report reviewed showing dilated small bowel, 

cecum/ascending colon with transition point within the proximal third of the 

transverse colon with focal narrowing in none distention of the remainder of the

colon, findings possibly representing stricture versus mass, small bowel 

dilation with small bowel feces throughout suggesting delayed transit/fecal 

stasis and dilated stomach and esophagus filled with ingested contents.  

Discussed these  findings with the ED physicianin detail and patient was 

accepted for admission to general surgery unit with telemetry under our services

with consultation to general surgery.  Patient taken for colectomy on 3/7/23.  

Awaiting for return of bowel function.  Repeat abdominal x-ray completed on 

3/12/23 reviewed and radiology report showing nonspecific bowel gas pattern 

without radiographic evidence for acute process with persistent few gaseous 

dilated loops of bowel remaining. 





Physical exam:





Patient seen and fully evaluated at bedside this morning.  He is postoperative 

day 7.  Patient was sitting up in the chair, he is tolerating a full liquid diet

.  Patient reports passing flatus and reports 2-3 bowel movements since being 

placed on lactulose. 





Vital signs reviewed and stable. 


General: Nontoxic, no distress and appears stated age.  


Derm: Skin warm and dry, normal coloration for ethnicity.


Head: Atraumatic, normocephalic and symmetric.  


Eyes: EOMs intact, no lid lag, and anicteric sclera


Mouth: no lip lesions, mucus membranes moist


Cardiovascular: regular rate and rhythm with normal S1S2, no murmur, positive 

posterior tibial pulses bilaterally, and cap refill < 2 seconds.  


Lungs: Respirations even, regular, and unlabored on room air. Lungs CTA 

bilaterally, no rhonchi, no rales, no wheezing, and no accessory muscle usage. 


Abdominal: Abdomen soft and slightly distended, dressing intact midline and 

wound VAC in place.


Ext: ROM intact. No gross muscle atrophy, no edema, no contractures


Neuro: Speech clear, face symmetrical and CN II-XII grossly intact with no noted

focal neuro deficits


Psych: Alert and oriented to person, place, time, and situation. Appropriate and

pleasant affect. 





Assessment and Plan of Care:





Diabetes insipidus


-Morning labs reviewed.  CBC showing leukocytosis with WBC count of 16.7 and 

normocytic anemia with hemoglobin of 8.9.  BMP showing hyperchloremia with 

chloride of 115


-Patient has received a total of 2 doses of DDAVP and had successful decrease in

urinary output to 2450 mL over the past 24 hours..


-Nephrology following and discussed plan of care with Dr. Melendez.  She is 

recommending holding off on DDAVP today and monitoring how urinary output 

responds and to repeat sodium level tomorrow morning.  


-Lithium remains discontinued and reconsulted psychiatry for assistance with 

medication recommendations for replacement of lithium.


-Continued close monitoring of glucose levels with meals and at bedtime and 

continue NovoLog sliding scale to maintain tight glycemic control.


-Order placed for repeat BMP tomorrow morning to follow up on sodium levels.


-Continue with strict I's and O's for close monitoring of urinary output.





Small bowel obstruction, status post right colectomy and partial omentectomy on 

3/7/23, postoperative day 7


Dilated proximal transverse right colon


Acute Postoperative blood loss anemia, expected finding and stable


Leukocytosis


-CBC showing leukocytosis with WBC count of 16.7 and normocytic anemia with 

hemoglobin of 8.9.


-Patient completed 7 day course of IV antibiotics cefepime and Flagyl on 3/12/23

and on 3/14/23 WBC count increased to 16.7 from previous 8.8.


-Discussed with general surgery PA and it was decided to restart patient on IV 

antibiotics pending further results.


-Patient to remain on full liquid diet and advance as recommended by general 

surgery team.


-Continue with Protonix 40 mg IVP twice daily for GI prophylaxis at this time.


-Order placed for repeat morning CBC to follow up on normocytic anemia and 

leukocytosis..


-Management of post surgical wound/wound VAC per general surgery team.





EKG changes


-EKG showing sinus tachycardia at 102 bpm with T-wave inversion in lateral leads

I and aVL, T-wave inversion is new finding when compared to previous EKG 

completed 2/3/20 upon personal review and interpretation of both.


-Heart score 3. with risk of MACE 0.9-1.7%.  Troponins were trended overnight 

all resulting at less than 0.0123 draws.  Patient asymptomatic at this time.  

Therefore recommending no need for further cardiac testing or evaluation at this

time.





Diabetes mellitus with hyperglycemia


-Continue glycemic protocol with NovoLog sliding scale every 4 hours while 

patient remains nothing by mouth.  The glucose levels have ranged from 148-205 

over the past 24 hours.





Schizoaffective disorder


Anxiety and depression


Obsessive compulsive disorder


Bipolar disorder


-Continue patient's home medication regimen with Lamictal and Clozaril.  Lithium

discontinued secondary to development of diabetes insipidus. Glycopyrrolate and 

clomipramine were discontinued secondary to small bowel obstruction.


-Psychiatry following for medication management and recommendations since 

discontinuation of Glycopyrrolate, clomipramine, and lithium.


-Lithium level therapeutic at 0.8 with repeat lithium level of 0.7.











Resolved:


Acute kidney injury, secondary to dehydration.  Resolved.


Hypokalemia, resolved


Hyponatremia, resolved


Hypochloremia, resolved


Hypernatremia, resolved 


Hyperchloremia, resolved








CODE STATUS: Full code


DVT prophylaxis: SCDs


Discussed with: Patient, nephrologist, general surgeon PA and RN


Anticipated discharge date: Clinical course to determine


Anticipated discharge place: Home





Patient was seen independently by nurse practitioner.





This document was prepared using Dragon dictation software.  Please allow for 

errors in transcription while rare they do occur.











I reviewed the documentation as provided by the KAMAR above, who is the original 

author of this note.  I agree with the documented assessment and plan, with the 

following changes: none





Objective





- Vital Signs


Vital signs: 


                                   Vital Signs











Temp  97.8 F   03/14/23 08:46


 


Pulse  50 L  03/14/23 08:46


 


Resp  14   03/14/23 08:46


 


BP  98/67   03/14/23 08:46


 


Pulse Ox  98   03/14/23 08:46


 


FiO2      








                                 Intake & Output











 03/13/23 03/14/23 03/14/23





 18:59 06:59 18:59


 


Intake Total 1880  240


 


Output Total 2000 450 


 


Balance -120 -450 240


 


Weight 97.5 kg 94 kg 


 


Intake:   


 


    


 


    Cefepime 2 gm In Sodium 100  





    Chloride 0.9% 100 ml @ 25   





    mls/hr IVPB Q8HR [facility] Rx#   





    :[id number]   


 


    metroNIDAZOLE-NS  100  





    mg In Saline 1 100ml.bag   





    @ 100 mls/hr IVPB Q8HR   





    [facility] Rx#:[id number]   


 


  Intake, IV Titration 900  





  Amount   


 


    Dextrose 5% in Water 1, 900  





    000 ml @ 125 mls/hr IV .   





    [facility]H [facility] Rx#:[id number]   


 


  Oral 780  240


 


Output:   


 


  Urine 2000 450 


 


Other:   


 


  Voiding Method Indwelling Catheter Urinal 


 


  # Voids 1  


 


  # Bowel Movements 1 2 














- Labs


CBC & Chem 7: 


                                 03/14/23 06:20





                                 03/14/23 06:20


Labs: 


                  Abnormal Lab Results - Last 24 Hours (Table)











  03/13/23 03/13/23 03/13/23 Range/Units





  11:54 16:30 19:45 


 


WBC     (3.8-10.6)  k/uL


 


RBC     (4.30-5.90)  m/uL


 


Hgb     (13.0-17.5)  gm/dL


 


Hct     (39.0-53.0)  %


 


Chloride     ()  mmol/L


 


BUN     (9-20)  mg/dL


 


Glucose     (74-99)  mg/dL


 


POC Glucose (mg/dL)  261 H  242 H  205 H  ()  mg/dL


 


Calcium     (8.4-10.2)  mg/dL


 


Total Protein     (6.3-8.2)  g/dL


 


Albumin     (3.5-5.0)  g/dL














  03/13/23 03/14/23 03/14/23 Range/Units





  23:47 05:51 06:20 


 


WBC    16.7 H  (3.8-10.6)  k/uL


 


RBC    3.07 L  (4.30-5.90)  m/uL


 


Hgb    8.9 L  (13.0-17.5)  gm/dL


 


Hct    27.3 L  (39.0-53.0)  %


 


Chloride     ()  mmol/L


 


BUN     (9-20)  mg/dL


 


Glucose     (74-99)  mg/dL


 


POC Glucose (mg/dL)  177 H  165 H   ()  mg/dL


 


Calcium     (8.4-10.2)  mg/dL


 


Total Protein     (6.3-8.2)  g/dL


 


Albumin     (3.5-5.0)  g/dL














  03/14/23 Range/Units





  06:20 


 


WBC   (3.8-10.6)  k/uL


 


RBC   (4.30-5.90)  m/uL


 


Hgb   (13.0-17.5)  gm/dL


 


Hct   (39.0-53.0)  %


 


Chloride  115 H  ()  mmol/L


 


BUN  7 L  (9-20)  mg/dL


 


Glucose  135 H  (74-99)  mg/dL


 


POC Glucose (mg/dL)   ()  mg/dL


 


Calcium  8.2 L  (8.4-10.2)  mg/dL


 


Total Protein  4.7 L  (6.3-8.2)  g/dL


 


Albumin  2.4 L  (3.5-5.0)  g/dL

## 2023-03-15 LAB
ALBUMIN SERPL-MCNC: 2.9 G/DL (ref 3.5–5)
ALP SERPL-CCNC: 76 U/L (ref 38–126)
ALT SERPL-CCNC: 23 U/L (ref 4–49)
ANION GAP SERPL CALC-SCNC: 11 MMOL/L
AST SERPL-CCNC: 22 U/L (ref 17–59)
BASOPHILS # BLD AUTO: 0.1 K/UL (ref 0–0.2)
BASOPHILS NFR BLD AUTO: 0 %
BUN SERPL-SCNC: 5 MG/DL (ref 9–20)
CALCIUM SPEC-MCNC: 8.5 MG/DL (ref 8.4–10.2)
CHLORIDE SERPL-SCNC: 114 MMOL/L (ref 98–107)
CLOZAPINE SERPL-MCNC: 538 NG/ML (ref 200–700)
CO2 SERPL-SCNC: 20 MMOL/L (ref 22–30)
EOSINOPHIL # BLD AUTO: 0 K/UL (ref 0–0.7)
EOSINOPHIL NFR BLD AUTO: 0 %
ERYTHROCYTE [DISTWIDTH] IN BLOOD BY AUTOMATED COUNT: 3.38 M/UL (ref 4.3–5.9)
ERYTHROCYTE [DISTWIDTH] IN BLOOD: 15 % (ref 11.5–15.5)
GLUCOSE BLD-MCNC: 160 MG/DL (ref 70–110)
GLUCOSE BLD-MCNC: 220 MG/DL (ref 70–110)
GLUCOSE BLD-MCNC: 256 MG/DL (ref 70–110)
GLUCOSE SERPL-MCNC: 163 MG/DL (ref 74–99)
HCT VFR BLD AUTO: 31 % (ref 39–53)
HGB BLD-MCNC: 9.9 GM/DL (ref 13–17.5)
LYMPHOCYTES # SPEC AUTO: 1 K/UL (ref 1–4.8)
LYMPHOCYTES NFR SPEC AUTO: 7 %
MAGNESIUM SPEC-SCNC: 2.1 MG/DL (ref 1.6–2.3)
MCH RBC QN AUTO: 29.4 PG (ref 25–35)
MCHC RBC AUTO-ENTMCNC: 32.1 G/DL (ref 31–37)
MCV RBC AUTO: 91.4 FL (ref 80–100)
MONOCYTES # BLD AUTO: 0.5 K/UL (ref 0–1)
MONOCYTES NFR BLD AUTO: 4 %
NEUTROPHILS # BLD AUTO: 11.8 K/UL (ref 1.3–7.7)
NEUTROPHILS NFR BLD AUTO: 88 %
PLATELET # BLD AUTO: 312 K/UL (ref 150–450)
POTASSIUM SERPL-SCNC: 3.3 MMOL/L (ref 3.5–5.1)
PROT SERPL-MCNC: 5.5 G/DL (ref 6.3–8.2)
SODIUM SERPL-SCNC: 145 MMOL/L (ref 137–145)
WBC # BLD AUTO: 13.5 K/UL (ref 3.8–10.6)

## 2023-03-15 RX ADMIN — INSULIN ASPART SCH: 100 INJECTION, SOLUTION INTRAVENOUS; SUBCUTANEOUS at 06:14

## 2023-03-15 RX ADMIN — DESMOPRESSIN ACETATE SCH MG: 0.2 TABLET ORAL at 22:03

## 2023-03-15 RX ADMIN — INSULIN ASPART SCH UNIT: 100 INJECTION, SOLUTION INTRAVENOUS; SUBCUTANEOUS at 12:31

## 2023-03-15 RX ADMIN — INSULIN ASPART SCH UNIT: 100 INJECTION, SOLUTION INTRAVENOUS; SUBCUTANEOUS at 22:01

## 2023-03-15 RX ADMIN — CITALOPRAM HYDROBROMIDE SCH MG: 20 TABLET ORAL at 09:55

## 2023-03-15 RX ADMIN — ATORVASTATIN CALCIUM SCH MG: 10 TABLET, FILM COATED ORAL at 22:01

## 2023-03-15 RX ADMIN — LEVOTHYROXINE SODIUM SCH MCG: 50 TABLET ORAL at 06:16

## 2023-03-15 RX ADMIN — DESMOPRESSIN ACETATE SCH MG: 0.2 TABLET ORAL at 11:38

## 2023-03-15 RX ADMIN — CEFEPIME HYDROCHLORIDE SCH MLS/HR: 2 INJECTION, POWDER, FOR SOLUTION INTRAVENOUS at 10:10

## 2023-03-15 RX ADMIN — CEFEPIME HYDROCHLORIDE SCH MLS/HR: 2 INJECTION, POWDER, FOR SOLUTION INTRAVENOUS at 23:53

## 2023-03-15 RX ADMIN — HEPARIN SODIUM SCH UNIT: 5000 INJECTION INTRAVENOUS; SUBCUTANEOUS at 22:00

## 2023-03-15 RX ADMIN — CEFEPIME HYDROCHLORIDE SCH MLS/HR: 2 INJECTION, POWDER, FOR SOLUTION INTRAVENOUS at 01:02

## 2023-03-15 RX ADMIN — POTASSIUM CHLORIDE SCH MLS/HR: 14.9 INJECTION, SOLUTION INTRAVENOUS at 16:08

## 2023-03-15 RX ADMIN — HEPARIN SODIUM SCH UNIT: 5000 INJECTION INTRAVENOUS; SUBCUTANEOUS at 09:56

## 2023-03-15 RX ADMIN — DOCUSATE SODIUM SCH MG: 100 CAPSULE, LIQUID FILLED ORAL at 22:01

## 2023-03-15 RX ADMIN — INSULIN ASPART SCH UNIT: 100 INJECTION, SOLUTION INTRAVENOUS; SUBCUTANEOUS at 18:09

## 2023-03-15 RX ADMIN — TAMSULOSIN HYDROCHLORIDE SCH MG: 0.4 CAPSULE ORAL at 09:54

## 2023-03-15 RX ADMIN — PANTOPRAZOLE SODIUM SCH MG: 40 INJECTION, POWDER, FOR SOLUTION INTRAVENOUS at 22:00

## 2023-03-15 RX ADMIN — PROPRANOLOL HYDROCHLORIDE SCH MG: 80 CAPSULE, EXTENDED RELEASE ORAL at 09:54

## 2023-03-15 RX ADMIN — LACTULOSE SCH: 20 SOLUTION ORAL at 09:56

## 2023-03-15 RX ADMIN — PANTOPRAZOLE SODIUM SCH MG: 40 INJECTION, POWDER, FOR SOLUTION INTRAVENOUS at 10:10

## 2023-03-15 RX ADMIN — LACTULOSE SCH GM: 20 SOLUTION ORAL at 22:00

## 2023-03-15 RX ADMIN — METRONIDAZOLE SCH MLS/HR: 500 INJECTION, SOLUTION INTRAVENOUS at 10:09

## 2023-03-15 RX ADMIN — CEFEPIME HYDROCHLORIDE SCH MLS/HR: 2 INJECTION, POWDER, FOR SOLUTION INTRAVENOUS at 16:07

## 2023-03-15 RX ADMIN — DOCUSATE SODIUM SCH MG: 100 CAPSULE, LIQUID FILLED ORAL at 09:55

## 2023-03-15 NOTE — P.PN
Subjective


Progress Note Date: 03/15/23





CHIEF COMPLAINT: Colonic obstruction





HISTORY OF PRESENT ILLNESS: Patient is postop day #8 status post right colectomy

and partial omentectomy for possible stricture of the transverse colon and 

massive dilated proximal transverse right colon and small bowel.  Patient is 

sitting up in bed comfortably.  He is awake and alert.  His stools are having 

more formed pieces.  He reports his pain is controlled.  He denies any nausea or

vomiting.  He is asking if possible for has been of diet.  Patient reports that 

he wants to ambulate.  Afebrile.  Heart rate 102 WBC is down from 16.7-13.5.  

Antibiotics were restarted yesterday.  Hgb 9.9 platelets 312 sodium is 145 

potassium is 3.3 creatinine 0.86





Patient seen and examined with Dr. Mcgowan





PHYSICAL EXAM: 


VITAL SIGNS: Reviewed.


GENERAL: Well-developed in no acute distress. 


HEENT:  No sclera icterus. Extraocular movements grossly intact.  Moist buccal 

mucosa. Head is atraumatic, normocephalic. 


ABDOMEN:  Soft.  Incision site clean dry and intact. Prevana wound vac off.  

There is a mild rash around incision.





ASSESSMENT: 


1.  Massively dilated proximal transverse right colon and small bowel with 

possible stricture of the transverse colon status post right colectomy and 

partial omentectomy


2.  Hypokalemia 





PLAN: 


-Advance diet to regular


-continue lactulose 30ml BID


-Continue antibiotics


-Encouraged patient to ambulate


-Continue supportive care


-Encouraged patient to use incentive spirometer


-Patient needs ECF at discharge


-Patient can shower


-GI prophylaxis Protonix and DVT prophylaxis subcu heparin





Physician Assistant note has been reviewed by physician. Signing provider agrees

with the documented findings, assessment, and plan of care. 





Objective





- Vital Signs


Vital signs: 


                                   Vital Signs











Temp  98 F   03/15/23 08:00


 


Pulse  102 H  03/15/23 08:00


 


Resp  16   03/15/23 08:00


 


BP  133/71   03/15/23 08:00


 


Pulse Ox  93 L  03/15/23 08:00


 


FiO2      








                                 Intake & Output











 03/14/23 03/15/23 03/15/23





 18:59 06:59 18:59


 


Intake Total 1436 200 


 


Output Total 350 4928 700


 


Balance 3536 -9414 -700


 


Weight  92 kg 


 


Intake:   


 


  Intake, IV Titration 160 200 





  Amount   


 


    Cefepime 2 gm In Sodium  100 





    Chloride 0.9% 100 ml @ 25   





    mls/hr IVPB Q8HR [facility] Rx#   





    :[id number]   


 


    Lactated Ringers 1,000 ml 160  





    @ 0 mls/hr IV .STK-MED   





    [facility] Rx#:[id number]   


 


    metroNIDAZOLE-NS   100 





    mg In Saline 1 100ml.bag   





    @ 100 mls/hr IVPB Q8HR   





    [facility] Rx#:[id number]   


 


  Oral 1276  


 


Output:   


 


  Urine 350 1925 700


 


Other:   


 


  Voiding Method Urinal Urinal Urinal


 


  # Voids 1 1 


 


  # Bowel Movements 1 1 1














- Labs


CBC & Chem 7: 


                                 03/15/23 07:58





                                 03/15/23 07:58


Labs: 


                  Abnormal Lab Results - Last 24 Hours (Table)











  03/14/23 03/14/23 03/14/23 Range/Units





  11:42 16:34 20:22 


 


WBC     (3.8-10.6)  k/uL


 


RBC     (4.30-5.90)  m/uL


 


Hgb     (13.0-17.5)  gm/dL


 


Hct     (39.0-53.0)  %


 


Neutrophils #     (1.3-7.7)  k/uL


 


Potassium     (3.5-5.1)  mmol/L


 


Chloride     ()  mmol/L


 


Carbon Dioxide     (22-30)  mmol/L


 


BUN     (9-20)  mg/dL


 


Glucose     (74-99)  mg/dL


 


POC Glucose (mg/dL)  197 H  220 H  213 H  ()  mg/dL


 


Total Protein     (6.3-8.2)  g/dL


 


Albumin     (3.5-5.0)  g/dL














  03/15/23 03/15/23 03/15/23 Range/Units





  06:10 07:58 07:58 


 


WBC    13.5 H  (3.8-10.6)  k/uL


 


RBC    3.38 L  (4.30-5.90)  m/uL


 


Hgb    9.9 L  (13.0-17.5)  gm/dL


 


Hct    31.0 L  (39.0-53.0)  %


 


Neutrophils #    11.8 H  (1.3-7.7)  k/uL


 


Potassium   3.3 L   (3.5-5.1)  mmol/L


 


Chloride   114 H   ()  mmol/L


 


Carbon Dioxide   20 L   (22-30)  mmol/L


 


BUN   5 L   (9-20)  mg/dL


 


Glucose   163 H   (74-99)  mg/dL


 


POC Glucose (mg/dL)  160 H    ()  mg/dL


 


Total Protein   5.5 L   (6.3-8.2)  g/dL


 


Albumin   2.9 L   (3.5-5.0)  g/dL

## 2023-03-15 NOTE — P.PN
Subjective


Progress Note Date: 03/15/23


Hospital Course: 


Patient is a very pleasant 57-year-old male with a past medical history of type 

II non-insulin-dependent diabetes mellitus, hypertension, hyperlipidemia, GERD, 

schizoaffective disorder, anxiety, bipolar, depression, and previous inguinal 

hernia with repair.  Presented to the emergency department with a chief 

complaint of abdominal/epigastric/chest pain, nausea, vomiting, abdominal 

distention and constipation 2 weeks.  Patient reports vomiting dark black 

coffee-colored emesis and inability to have a bowel movement 2 weeks.  He 

reports pain institute diffuse abdomen radiating up into his epigastric region 

and chest.  He denies having any fevers, chills, palpitations, shortness of 

breath, cough or congestion, hemoptysis, melena, or hematochezia.  Patient 

reports decreased appetite and inability to hold down oral intake.  He does 

report decreased urinary output, but denies any retention or difficulties 

initiating a stream.  Reports concerns of dehydration.  Patient underwent full 

evaluation in the emergency department.  Labs completed and reviewed.  He was 

found to have leukocytosis with WBC count of 18.1 and normocytic anemia with 

hemoglobin of 12.6.  Coagulation profile normal findings.  BMP revealing 

hyponatremia with sodium 132 and hypochloremia with chloride 95 and acute kidney

injury with BUN 59, creatinine 2.11, and GFR of 34 as well as hyperglycemia with

glucose of 252.  Troponin less than 0.012.  EKG showing sinus tachycardia at 102

bpm with T-wave inversion in lateral leads I and aVL, T-wave inversion is new 

finding when compared to previous EKG completed 2/3/20.  Chest x-ray completed 

and report reviewed stating limited inspiration with bilateral consolidation and

right basilar atelectasis or infiltrate.  CT abdomen and pelvis without contrast

completed and radiology report reviewed showing dilated small bowel, 

cecum/ascending colon with transition point within the proximal third of the 

transverse colon with focal narrowing in none distention of the remainder of the

colon, findings possibly representing stricture versus mass, small bowel 

dilation with small bowel feces throughout suggesting delayed transit/fecal 

stasis and dilated stomach and esophagus filled with ingested contents.  

Discussed these  findings with the ED physicianin detail and patient was 

accepted for admission to general surgery unit with telemetry under our services

with consultation to general surgery.  Patient taken for colectomy on 3/7/23.  

Awaiting for return of bowel function.  Repeat abdominal x-ray completed on 

3/12/23 reviewed and radiology report showing nonspecific bowel gas pattern 

without radiographic evidence for acute process with persistent few gaseous 

dilated loops of bowel remaining.  Patient currently having bowel movements, a

ble to tolerate some oral intake.  Urine output slowly declining.








Subjective: 


Patient seen and examined at bedside.  No acute events overnight.  He continues 

to have some abdominal pain around the incisional site.  Denies any significant 

nausea or vomiting.  Continues to have urine output, claims that it is 

decreasing.  He is having bowel movements.  Denies any chest pain, shortness of 

breath, palpitations, lightheadedness.





Pertinent positives and negatives as discussed above, a complete review of 

systems was performed and all other systems are negative.








Vitals Signs Reviewed. 





General: nontoxic, no distress, appears at stated age


Derm: warm, dry, dressing is dry, intact, clean


Head: atraumatic, normocephalic, symmetric


Eyes: EOMI, no lid lag, anicteric sclera


Mouth: no lip lesion, mucus membranes moist


Cardiovascular: S1S2 reg, no murmur


Lungs: CTA bilateral, no rhonchi, no rales , no accessory muscle use


Abdominal: soft,  nontender to palpation, no guarding, no appreciable org

anomegaly


Ext: no gross muscle atrophy, no edema, no contractures


Neuro:  CN II-XI grossly intact, no focal neuro deficits


Psych: Alert, oriented, appropriate affect 





Data review today:


Lab data: WBC 18.5, hemoglobin 9.9, sodium 145, potassium 3.3, chloride 114, 

bicarb 20, blood sugars ranged between 160-213





Assessment and Plan:


Active:


Nephrogenic Diabetes insipidus


Hypokalemia


Non-anion gap Metabolic acidosis


Small bowel obstruction status post right colectomy and partial omentectomy on 

3/23


Dilated proximal transverse right colon


Acute Postoperative blood loss anemia, expected finding and stable and improving


Leukocytosis, reactive


Diabetes mellitus with hyperglycemia


Schizoaffective disorder


Anxiety and depression


OCD


Bipolar disorder





-Urine output has been stable at 2.2 L, net negative at 600 mL, continue to 

encourage oral intake


-Nephrology is following, desmopressin 0.05 mg by mouth twice a day


-Patient was given 40 mEq oral potassium by surgery


-Nonionic gap metabolic acidosis likely in the setting of acute renal disease


-Surgery note reviewed: Advance diet to regular


-Remains on IV cefepime 2 g every 8 hours, Flagyl 500 mg 3 times a day


-Hemoglobin improving


-Repeat CBC tomorrow


-On Norco 5 every 4 hours when necessary, Dilaudid 1 mg IV every 3 hours as 

needed, no doses needed today


-A1c was 6.9, currently on low-dose sliding scale insulin, no changes given 

multiple changes to diet


-If continues to remain hyper glycemic despite consistent diet, can consider 

adding long-acting insulin at 8


-Psychiatry following, clozapine increased, citalopram increased, continue 

Lamictal





Resolved:


Acute kidney injury


Hyponatremia


Hypochloremia


Hypernatremia





DVT ppx: Subcu heparin





Code status: Full Code





Anticipated discharge place: rehab


Anticipated discharge time: 1-2 days








Objective





- Vital Signs


Vital signs: 


                                   Vital Signs











Temp  97.7 F   03/15/23 12:00


 


Pulse  94   03/15/23 12:00


 


Resp  18   03/15/23 12:00


 


BP  136/83   03/15/23 12:00


 


Pulse Ox  94 L  03/15/23 12:00


 


FiO2      








                                 Intake & Output











 03/14/23 03/15/23 03/15/23





 18:59 06:59 18:59


 


Intake Total 1436 200 


 


Output Total 350 1925 700


 


Balance 1086 -1725 -700


 


Weight  92 kg 92 kg


 


Intake:   


 


  Intake, IV Titration 160 200 





  Amount   


 


    Cefepime 2 gm In Sodium  100 





    Chloride 0.9% 100 ml @ 25   





    mls/hr IVPB Q8HR [facility] Rx#   





    :[id number]   


 


    Lactated Ringers 1,000 ml 160  





    @ 0 mls/hr IV .STK-MED   





    [facility] Rx#:[id number]   


 


    metroNIDAZOLE-NS   100 





    mg In Saline 1 100ml.bag   





    @ 100 mls/hr IVPB Q8HR   





    [facility] Rx#:[id number]   


 


  Oral 1276  


 


Output:   


 


  Urine 350 1925 700


 


Other:   


 


  Voiding Method Urinal Urinal Urinal


 


  # Voids 1 1 


 


  # Bowel Movements 1 1 1














- Labs


CBC & Chem 7: 


                                 03/15/23 07:58





                                 03/15/23 07:58


Labs: 


                  Abnormal Lab Results - Last 24 Hours (Table)











  03/14/23 03/14/23 03/15/23 Range/Units





  16:34 20:22 06:10 


 


WBC     (3.8-10.6)  k/uL


 


RBC     (4.30-5.90)  m/uL


 


Hgb     (13.0-17.5)  gm/dL


 


Hct     (39.0-53.0)  %


 


Neutrophils #     (1.3-7.7)  k/uL


 


Potassium     (3.5-5.1)  mmol/L


 


Chloride     ()  mmol/L


 


Carbon Dioxide     (22-30)  mmol/L


 


BUN     (9-20)  mg/dL


 


Glucose     (74-99)  mg/dL


 


POC Glucose (mg/dL)  220 H  213 H  160 H  ()  mg/dL


 


Total Protein     (6.3-8.2)  g/dL


 


Albumin     (3.5-5.0)  g/dL














  03/15/23 03/15/23 03/15/23 Range/Units





  07:58 07:58 11:49 


 


WBC   13.5 H   (3.8-10.6)  k/uL


 


RBC   3.38 L   (4.30-5.90)  m/uL


 


Hgb   9.9 L   (13.0-17.5)  gm/dL


 


Hct   31.0 L   (39.0-53.0)  %


 


Neutrophils #   11.8 H   (1.3-7.7)  k/uL


 


Potassium  3.3 L    (3.5-5.1)  mmol/L


 


Chloride  114 H    ()  mmol/L


 


Carbon Dioxide  20 L    (22-30)  mmol/L


 


BUN  5 L    (9-20)  mg/dL


 


Glucose  163 H    (74-99)  mg/dL


 


POC Glucose (mg/dL)    256 H  ()  mg/dL


 


Total Protein  5.5 L    (6.3-8.2)  g/dL


 


Albumin  2.9 L    (3.5-5.0)  g/dL

## 2023-03-15 NOTE — P.PN
Progress Note - Text


Progress Note Date: 03/15/23








Interval history: 


Patient was seen resting in bed and was directable and agreeable to speak with 

writer.  Currently, the patient is not reporting any suicidal or homicidal 

ideation, intention, and/or plan.  He continues to report auditory 

hallucinations however states that they are stable.  He denies any visual 

hallucinations.  He is denying any paranoia or other delusions.  He has been 

adherent with his medication and is not endorsing any significant side effect.  

He reports that both his physical as well as mental health have been improving. 

He reports no issues regarding his sleep or appetite.





Mental status exam: Grossly unchanged from yesterday


General Appearance: Patient appears to be stated age is alert, directable, and 

cooperative.


Behavior: No agitated behavior. Patient is calm and directable.


Speech: Patient's speech is fluent and non-pressured. 


Mood/Affect: Mood is good, affect is congruent and at times bright.


Suicidality/Homicidality: Patient denies having any suicidal or homicidal 

ideation intent or plan.  


Perceptions: He reports chronic auditory hallucinations, but he has learned to 

ignore them. He denies visual hallucinations. 


Though content/process: No delusional thought content on my assessment, and 

thought process is organized.


Memory and concentration: AOX3, grossly intact for the purposes of this session


Judgment and insight: average





                                   Vital Signs











Temp  97.7 F   03/15/23 12:00


 


Pulse  94   03/15/23 12:00


 


Resp  18   03/15/23 12:00


 


BP  136/83   03/15/23 12:00


 


Pulse Ox  94 L  03/15/23 12:00


 


FiO2      








                                 Intake & Output











 03/14/23 03/15/23 03/15/23





 18:59 06:59 18:59


 


Intake Total 1436 200 


 


Output Total 350 1925 700


 


Balance 2178 -1721 -700


 


Weight  92 kg 92 kg


 


Intake:   


 


  Intake, IV Titration 160 200 





  Amount   


 


    Cefepime 2 gm In Sodium  100 





    Chloride 0.9% 100 ml @ 25   





    mls/hr IVPB Q8HR [facility] Rx#   





    :[id number]   


 


    Lactated Ringers 1,000 ml 160  





    @ 0 mls/hr IV .STK-MED   





    [facility] Rx#:[id number]   


 


    metroNIDAZOLE-NS   100 





    mg In Saline 1 100ml.bag   





    @ 100 mls/hr IVPB Q8HR   





    [facility] Rx#:[id number]   


 


  Oral 1276  


 


Output:   


 


  Urine 350 1925 700


 


Other:   


 


  Voiding Method Urinal Urinal Urinal


 


  # Voids 1 1 


 


  # Bowel Movements 1 1 1








                               Laboratory Results











WBC  13.5 k/uL (3.8-10.6)  H  03/15/23  07:58    


 


RBC  3.38 m/uL (4.30-5.90)  L  03/15/23  07:58    


 


Hgb  9.9 gm/dL (13.0-17.5)  L  03/15/23  07:58    


 


Hct  31.0 % (39.0-53.0)  L  03/15/23  07:58    


 


MCV  91.4 fL (80.0-100.0)   03/15/23  07:58    


 


MCH  29.4 pg (25.0-35.0)   03/15/23  07:58    


 


MCHC  32.1 g/dL (31.0-37.0)   03/15/23  07:58    


 


RDW  15.0 % (11.5-15.5)   03/15/23  07:58    


 


Plt Count  312 k/uL (150-450)   03/15/23  07:58    


 


MPV  7.7   03/15/23  07:58    


 


Absolute Nucleated RBC  0.02 X 10*3/uL (0.00-0.00)  H  03/12/23  06:07    


 


Neutrophils %  88 %  03/15/23  07:58    


 


Lymphocytes %  7 %  03/15/23  07:58    


 


Monocytes %  4 %  03/15/23  07:58    


 


Eosinophils %  0 %  03/15/23  07:58    


 


Basophils %  0 %  03/15/23  07:58    


 


Neutrophils #  11.8 k/uL (1.3-7.7)  H  03/15/23  07:58    


 


Lymphocytes #  1.0 k/uL (1.0-4.8)   03/15/23  07:58    


 


Monocytes #  0.5 k/uL (0-1.0)   03/15/23  07:58    


 


Eosinophils #  0.0 k/uL (0-0.7)   03/15/23  07:58    


 


Basophils #  0.1 k/uL (0-0.2)   03/15/23  07:58    


 


NRBC/100 WBC Diff  0.2 /100 WBCS (0.0-0.0)  H  03/12/23  06:07    


 


Hypochromasia  Moderate   03/15/23  07:58    


 


Poikilocytosis  Slight   03/15/23  07:58    


 


PT  10.8 sec (9.0-12.0)   03/06/23  10:04    


 


INR  1.0  (<1.2)   03/06/23  10:04    


 


APTT  22.7 sec (22.0-30.0)   03/06/23  10:04    


 


Sodium  145 mmol/L (137-145)   03/15/23  07:58    


 


Potassium  3.3 mmol/L (3.5-5.1)  L  03/15/23  07:58    


 


Chloride  114 mmol/L ()  H  03/15/23  07:58    


 


Carbon Dioxide  20 mmol/L (22-30)  L  03/15/23  07:58    


 


Anion Gap  11 mmol/L  03/15/23  07:58    


 


BUN  5 mg/dL (9-20)  L  03/15/23  07:58    


 


Creatinine  0.86 mg/dL (0.66-1.25)   03/15/23  07:58    


 


Est GFR (CKD-EPI)AfAm  >90  (>60 ml/min/1.73 sqM)   03/15/23  07:58    


 


Est GFR (CKD-EPI)NonAf  >90  (>60 ml/min/1.73 sqM)   03/15/23  07:58    


 


BUN/Creatinine Ratio  12.23 Ratio (12.00-20.00)   03/12/23  06:07    


 


Glucose  163 mg/dL (74-99)  H  03/15/23  07:58    


 


POC Glucose (mg/dL)  256 mg/dL ()  H  03/15/23  11:49    


 


POC Glu Operater ID  Bishop Serna   03/15/23  11:49    


 


Estimated Ave Glu mg/dL  152   03/07/23  06:04    


 


Hemoglobin A1c  6.9 % (0.0-6.0)  H  03/07/23  06:04    


 


Plasma Lactic Acid Broderick  1.2 mmol/L (0.7-2.0)   03/06/23  10:04    


 


Calcium  8.5 mg/dL (8.4-10.2)   03/15/23  07:58    


 


Magnesium  2.1 mg/dL (1.6-2.3)   03/15/23  07:58    


 


Total Bilirubin  0.4 mg/dL (0.2-1.3)   03/15/23  07:58    


 


AST  22 U/L (17-59)   03/15/23  07:58    


 


ALT  23 U/L (4-49)   03/15/23  07:58    


 


Alkaline Phosphatase  76 U/L ()   03/15/23  07:58    


 


Total Creatine Kinase  38 U/L ()  L  03/06/23  10:04    


 


CK-MB (CK-2)  1.6 ng/mL (0.0-2.4)   03/06/23  10:04    


 


CK-MB (CK-2) Rel Index  4.2   03/06/23  10:04    


 


Troponin I  <0.012 ng/mL (0.000-0.034)   03/06/23  21:39    


 


Total Protein  5.5 g/dL (6.3-8.2)  L  03/15/23  07:58    


 


Albumin  2.9 g/dL (3.5-5.0)  L  03/15/23  07:58    


 


Globulin  2.1 g/dL (1.6-3.3)   03/12/23  06:07    


 


Albumin/Globulin Ratio  1.33 g/dL (1.60-3.17)  L  03/12/23  06:07    


 


TSH  0.558 mIU/L (0.465-4.680)   03/12/23  14:28    


 


Urine Color  Yellow   03/06/23  14:43    


 


Urine Appearance  Clear  (Clear)   03/06/23  14:43    


 


Urine pH  6.0  (5.0-8.0)   03/06/23  14:43    


 


Ur Specific Gravity  1.022  (1.001-1.035)   03/06/23  14:43    


 


Urine Protein  1+  (Negative)  H  03/06/23  14:43    


 


Urine Glucose (UA)  Negative  (Negative)   03/06/23  14:43    


 


Urine Ketones  Negative  (Negative)   03/06/23  14:43    


 


Urine Blood  Negative  (Negative)   03/06/23  14:43    


 


Urine Nitrite  Negative  (Negative)   03/06/23  14:43    


 


Urine Bilirubin  Negative  (Negative)   03/06/23  14:43    


 


Urine Urobilinogen  2.0 mg/dL (<2.0)   03/06/23  14:43    


 


Ur Leukocyte Esterase  Negative  (Negative)   03/06/23  14:43    


 


Urine RBC  <1 /hpf (0-5)   03/06/23  14:43    


 


Urine WBC  1 /hpf (0-5)   03/06/23  14:43    


 


Ur Squamous Epith Cells  <1 /hpf (0-4)   03/06/23  14:43    


 


Hyaline Casts  26 /lpf (0-2)  H  03/06/23  14:43    


 


Urine Mucus  Rare /hpf (None)  H  03/06/23  14:43    


 


Urine Osmolality  215 mosm/kg ()   03/12/23  13:04    


 


Ur Random Sodium  93 mmol/L ()   03/12/23  13:04    


 


Lithium  0.7 mmol/L  03/12/23  14:28    


 


Blood Type  O Positive   03/06/23  17:04    


 


Blood Type Confirm  O Positive   03/06/23  10:04    


 


Blood Type Recheck  No Previous Record   03/06/23  17:04    


 


Bld Type Recheck Status  CABO Indicated   03/06/23  17:04    


 


Antibody Screen  NEGATIVE   03/06/23  17:04    


 


Spec Expiration Date  03/09/2023 - 2304 03/06/23  17:04    














IMPRESSIONS: 


Schizoaffective disorder, depressive type


Obsessive-compulsive disorder


Alcohol use disorder, in sustained remission





PLAN: 


-Continue your medical and surgical management


-At this time patient DOES NOT meet criteria for inpatient psychiatric 

admission. The patient is currently not presenting with any acute symptoms of 

psychosis or rigoberto. He is not endorsing any suicidal or homicidal ideation, 

intention, and/or plan.  


-Continue Clozapine 175 mg at bedtime after review of Clozapine levels. 


- Continue Lamictal


-Continue Citalopram 20 mg daily for depression/OCD/anxiety. 


-In addition to the recommendations above, please note the following recomme

ndations from the FDA regarding Clozapine prescribing:


-Encourage appropriate hydration, physical activity, and foods that are high in 

fiber.


-Patient does not require 1:1 sitter for safety


-Patient is recommended to follow up with his [facility] team in the outpatient 

setting. He is cleared psychiatrically for discharge.


-Psychiatry will sign off at this time.

## 2023-03-15 NOTE — P.PN
Subjective





Patient is seen for follow-up for hyponatremia secondary to diabetes insipidus, 

currently improved with discontinuation of lithium and 2 doses of DDAVP.


Status post D5W, currently off of IV fluids.


Sodium is at 145 today, up from 142 yesterday. Pt did not receive DDAVP 

yesterday.





Objective





- Vital Signs


Vital signs: 


                                   Vital Signs











Temp  97.7 F   03/15/23 12:00


 


Pulse  94   03/15/23 12:00


 


Resp  18   03/15/23 12:00


 


BP  136/83   03/15/23 12:00


 


Pulse Ox  94 L  03/15/23 12:00


 


FiO2      








                                 Intake & Output











 03/14/23 03/15/23 03/15/23





 18:59 06:59 18:59


 


Intake Total 1436 200 


 


Output Total 350 1925 1625


 


Balance 1086 -1725 -1625


 


Weight  92 kg 92 kg


 


Intake:   


 


  Intake, IV Titration 160 200 





  Amount   


 


    Cefepime 2 gm In Sodium  100 





    Chloride 0.9% 100 ml @ 25   





    mls/hr IVPB Q8HR [facility] Rx#   





    :[id number]   


 


    Lactated Ringers 1,000 ml 160  





    @ 0 mls/hr IV .STK-MED   





    [facility] Rx#:[id number]   


 


    metroNIDAZOLE-NS   100 





    mg In Saline 1 100ml.bag   





    @ 100 mls/hr IVPB Q8HR   





    [facility] Rx#:[id number]   


 


  Oral 1276  


 


Output:   


 


  Urine 350 1925 1625


 


Other:   


 


  Voiding Method Urinal Urinal Urinal


 


  # Voids 1 1 


 


  # Bowel Movements 1 1 1














- Exam





Awake, comfortable, no acute distress.


Lungs are clear


CVS S1 and S2


Abdomen is soft, mild tenderness


Lower extremities show no edema


CNS exam is grossly intact.





- Labs


CBC & Chem 7: 


                                 03/15/23 07:58





                                 03/15/23 07:58


Labs: 


                  Abnormal Lab Results - Last 24 Hours (Table)











  03/14/23 03/14/23 03/15/23 Range/Units





  16:34 20:22 06:10 


 


WBC     (3.8-10.6)  k/uL


 


RBC     (4.30-5.90)  m/uL


 


Hgb     (13.0-17.5)  gm/dL


 


Hct     (39.0-53.0)  %


 


Neutrophils #     (1.3-7.7)  k/uL


 


Potassium     (3.5-5.1)  mmol/L


 


Chloride     ()  mmol/L


 


Carbon Dioxide     (22-30)  mmol/L


 


BUN     (9-20)  mg/dL


 


Glucose     (74-99)  mg/dL


 


POC Glucose (mg/dL)  220 H  213 H  160 H  ()  mg/dL


 


Total Protein     (6.3-8.2)  g/dL


 


Albumin     (3.5-5.0)  g/dL














  03/15/23 03/15/23 03/15/23 Range/Units





  07:58 07:58 11:49 


 


WBC   13.5 H   (3.8-10.6)  k/uL


 


RBC   3.38 L   (4.30-5.90)  m/uL


 


Hgb   9.9 L   (13.0-17.5)  gm/dL


 


Hct   31.0 L   (39.0-53.0)  %


 


Neutrophils #   11.8 H   (1.3-7.7)  k/uL


 


Potassium  3.3 L    (3.5-5.1)  mmol/L


 


Chloride  114 H    ()  mmol/L


 


Carbon Dioxide  20 L    (22-30)  mmol/L


 


BUN  5 L    (9-20)  mg/dL


 


Glucose  163 H    (74-99)  mg/dL


 


POC Glucose (mg/dL)    256 H  ()  mg/dL


 


Total Protein  5.5 L    (6.3-8.2)  g/dL


 


Albumin  2.9 L    (3.5-5.0)  g/dL














Assessment and Plan


Assessment: 





1. Hypernatremia associated with polyuria and free water deficit secondary to 

nephrogenic DI. Also a component of hyperglycemia. Improved with 2 doses of 

DDAVP. Lithium is appropriately discontinued. Sodium has improved. It is 

slightly higher today. Will start scheduled dose of oral DDAVP


2. S/p omentectomy and right colectomy on 3/7/23


3. Hypokalemia, s/p replacement


4. Bipolar disorder on lithium, currently on hold.


Plan: 





Add scheduled oral dose of DDAVP


Continue too hold lithium.

## 2023-03-16 LAB
ANION GAP SERPL CALC-SCNC: 5 MMOL/L
BASOPHILS # BLD AUTO: 0 K/UL (ref 0–0.2)
BASOPHILS NFR BLD AUTO: 0 %
BUN SERPL-SCNC: 6 MG/DL (ref 9–20)
CALCIUM SPEC-MCNC: 8.3 MG/DL (ref 8.4–10.2)
CHLORIDE SERPL-SCNC: 113 MMOL/L (ref 98–107)
CO2 SERPL-SCNC: 24 MMOL/L (ref 22–30)
EOSINOPHIL # BLD AUTO: 0 K/UL (ref 0–0.7)
EOSINOPHIL NFR BLD AUTO: 0 %
ERYTHROCYTE [DISTWIDTH] IN BLOOD BY AUTOMATED COUNT: 3.39 M/UL (ref 4.3–5.9)
ERYTHROCYTE [DISTWIDTH] IN BLOOD: 14.7 % (ref 11.5–15.5)
GLUCOSE BLD-MCNC: 210 MG/DL (ref 70–110)
GLUCOSE BLD-MCNC: 237 MG/DL (ref 70–110)
GLUCOSE BLD-MCNC: 276 MG/DL (ref 70–110)
GLUCOSE BLD-MCNC: 309 MG/DL (ref 70–110)
GLUCOSE SERPL-MCNC: 165 MG/DL (ref 74–99)
HCT VFR BLD AUTO: 30.3 % (ref 39–53)
HGB BLD-MCNC: 9.8 GM/DL (ref 13–17.5)
LYMPHOCYTES # SPEC AUTO: 1 K/UL (ref 1–4.8)
LYMPHOCYTES NFR SPEC AUTO: 12 %
MAGNESIUM SPEC-SCNC: 2.2 MG/DL (ref 1.6–2.3)
MCH RBC QN AUTO: 29 PG (ref 25–35)
MCHC RBC AUTO-ENTMCNC: 32.4 G/DL (ref 31–37)
MCV RBC AUTO: 89.6 FL (ref 80–100)
MONOCYTES # BLD AUTO: 0.4 K/UL (ref 0–1)
MONOCYTES NFR BLD AUTO: 4 %
NEUTROPHILS # BLD AUTO: 7.1 K/UL (ref 1.3–7.7)
NEUTROPHILS NFR BLD AUTO: 83 %
PLATELET # BLD AUTO: 369 K/UL (ref 150–450)
POTASSIUM SERPL-SCNC: 3.3 MMOL/L (ref 3.5–5.1)
SODIUM SERPL-SCNC: 142 MMOL/L (ref 137–145)
WBC # BLD AUTO: 8.6 K/UL (ref 3.8–10.6)

## 2023-03-16 RX ADMIN — TAMSULOSIN HYDROCHLORIDE SCH MG: 0.4 CAPSULE ORAL at 09:29

## 2023-03-16 RX ADMIN — INSULIN ASPART SCH UNIT: 100 INJECTION, SOLUTION INTRAVENOUS; SUBCUTANEOUS at 20:30

## 2023-03-16 RX ADMIN — DOCUSATE SODIUM SCH MG: 100 CAPSULE, LIQUID FILLED ORAL at 20:30

## 2023-03-16 RX ADMIN — PROPRANOLOL HYDROCHLORIDE SCH MG: 80 CAPSULE, EXTENDED RELEASE ORAL at 09:28

## 2023-03-16 RX ADMIN — CEFEPIME HYDROCHLORIDE SCH MLS/HR: 2 INJECTION, POWDER, FOR SOLUTION INTRAVENOUS at 17:20

## 2023-03-16 RX ADMIN — POTASSIUM CHLORIDE SCH: 14.9 INJECTION, SOLUTION INTRAVENOUS at 19:42

## 2023-03-16 RX ADMIN — CEFEPIME HYDROCHLORIDE SCH MLS/HR: 2 INJECTION, POWDER, FOR SOLUTION INTRAVENOUS at 09:33

## 2023-03-16 RX ADMIN — CEFEPIME HYDROCHLORIDE SCH MLS/HR: 2 INJECTION, POWDER, FOR SOLUTION INTRAVENOUS at 23:15

## 2023-03-16 RX ADMIN — LEVOTHYROXINE SODIUM SCH MCG: 50 TABLET ORAL at 06:49

## 2023-03-16 RX ADMIN — POTASSIUM CHLORIDE SCH MEQ: 20 TABLET, EXTENDED RELEASE ORAL at 11:50

## 2023-03-16 RX ADMIN — CITALOPRAM HYDROBROMIDE SCH MG: 20 TABLET ORAL at 09:29

## 2023-03-16 RX ADMIN — LACTULOSE SCH GM: 20 SOLUTION ORAL at 11:50

## 2023-03-16 RX ADMIN — POTASSIUM CHLORIDE SCH MEQ: 20 TABLET, EXTENDED RELEASE ORAL at 17:20

## 2023-03-16 RX ADMIN — INSULIN ASPART SCH UNIT: 100 INJECTION, SOLUTION INTRAVENOUS; SUBCUTANEOUS at 17:23

## 2023-03-16 RX ADMIN — DOCUSATE SODIUM SCH MG: 100 CAPSULE, LIQUID FILLED ORAL at 09:29

## 2023-03-16 RX ADMIN — DESMOPRESSIN ACETATE SCH MG: 0.2 TABLET ORAL at 09:27

## 2023-03-16 RX ADMIN — PANTOPRAZOLE SODIUM SCH MG: 40 INJECTION, POWDER, FOR SOLUTION INTRAVENOUS at 20:29

## 2023-03-16 RX ADMIN — DESMOPRESSIN ACETATE SCH MG: 0.2 TABLET ORAL at 20:30

## 2023-03-16 RX ADMIN — ATORVASTATIN CALCIUM SCH MG: 10 TABLET, FILM COATED ORAL at 20:30

## 2023-03-16 RX ADMIN — PANTOPRAZOLE SODIUM SCH MG: 40 INJECTION, POWDER, FOR SOLUTION INTRAVENOUS at 09:29

## 2023-03-16 RX ADMIN — INSULIN ASPART SCH UNIT: 100 INJECTION, SOLUTION INTRAVENOUS; SUBCUTANEOUS at 11:50

## 2023-03-16 RX ADMIN — INSULIN ASPART SCH UNIT: 100 INJECTION, SOLUTION INTRAVENOUS; SUBCUTANEOUS at 06:49

## 2023-03-16 RX ADMIN — HEPARIN SODIUM SCH UNIT: 5000 INJECTION INTRAVENOUS; SUBCUTANEOUS at 09:28

## 2023-03-16 RX ADMIN — HEPARIN SODIUM SCH UNIT: 5000 INJECTION INTRAVENOUS; SUBCUTANEOUS at 20:30

## 2023-03-16 NOTE — P.PN
Subjective


Progress Note Date: 03/16/23





CHIEF COMPLAINT: Colonic obstruction





HISTORY OF PRESENT ILLNESS: Patient is postop day #9 status post right colectomy

and partial omentectomy for possible stricture of the transverse colon and 

massive dilated proximal transverse right colon and small bowel.  Patient 

sitting in bedside chair.  He reports that his pain is controlled.  He has been 

having bowel movements.  Denies any nausea or vomiting.  Patient does complain 

of itching at the incision site Afebrile.  WBC is down from 13.5-8.6 Hgb 9.8 pl

atelets 369 Na 142 potassium 3.3 creatinine 0.8 to magnesium 2.2





Patient seen and examined with Dr. Mcgowan





PHYSICAL EXAM: 


VITAL SIGNS: Reviewed.


GENERAL: Well-developed in no acute distress. 


HEENT:  No sclera icterus. Extraocular movements grossly intact.  Moist buccal 

mucosa. Head is atraumatic, normocephalic. 


ABDOMEN:  Soft.  Incision site clean dry and intact. There is a mild rash around

incision.





ASSESSMENT: 


1.  Massively dilated proximal transverse right colon and small bowel with 

possible stricture of the transverse colon status post right colectomy and 

partial omentectomy


2.  Hypokalemia 





PLAN: 


-Continue regular diet


-Continue lactulose 30ml but decrease dose to daily


-Patient had severe constipation and a lot of retained stool noted in the colon.

 Recommend patient stays on lactulose at least daily even at discharge to avoid 

constipation


-Patient can be discharged from surgical standpoint when medically cleared


-Continue antibiotics


-Encouraged patient to ambulate


-Continue supportive care


-Encouraged patient to use incentive spirometer


-Patient needs ECF at discharge


-Patient can shower


-GI prophylaxis Protonix and DVT prophylaxis subcu heparin





Physician Assistant note has been reviewed by physician. Signing provider agrees

with the documented findings, assessment, and plan of care. 





Objective





- Vital Signs


Vital signs: 


                                   Vital Signs











Temp  97.8 F   03/16/23 04:00


 


Pulse  86   03/16/23 11:53


 


Resp  16   03/16/23 11:53


 


BP  112/72   03/16/23 11:53


 


Pulse Ox  93 L  03/16/23 11:53


 


FiO2      








                                 Intake & Output











 03/15/23 03/16/23 03/16/23





 18:59 06:59 18:59


 


Intake Total 700  137


 


Output Total 9077 2226 1150


 


Balance -2025 -2225 -1013


 


Weight 92 kg  


 


Intake:   


 


  Oral 700  137


 


Output:   


 


  Urine 2725 2225 1150


 


Other:   


 


  Voiding Method Urinal Urinal Urinal


 


  # Bowel Movements 1 1 1














- Labs


CBC & Chem 7: 


                                 03/16/23 09:50





                                 03/16/23 09:50


Labs: 


                  Abnormal Lab Results - Last 24 Hours (Table)











  03/15/23 03/16/23 03/16/23 Range/Units





  20:12 06:04 09:50 


 


RBC     (4.30-5.90)  m/uL


 


Hgb     (13.0-17.5)  gm/dL


 


Hct     (39.0-53.0)  %


 


Potassium    3.3 L  (3.5-5.1)  mmol/L


 


Chloride    113 H  ()  mmol/L


 


BUN    6 L  (9-20)  mg/dL


 


Glucose    165 H  (74-99)  mg/dL


 


POC Glucose (mg/dL)  220 H  237 H   ()  mg/dL


 


Calcium    8.3 L  (8.4-10.2)  mg/dL














  03/16/23 03/16/23 Range/Units





  09:50 11:40 


 


RBC  3.39 L   (4.30-5.90)  m/uL


 


Hgb  9.8 L   (13.0-17.5)  gm/dL


 


Hct  30.3 L   (39.0-53.0)  %


 


Potassium    (3.5-5.1)  mmol/L


 


Chloride    ()  mmol/L


 


BUN    (9-20)  mg/dL


 


Glucose    (74-99)  mg/dL


 


POC Glucose (mg/dL)   210 H  ()  mg/dL


 


Calcium    (8.4-10.2)  mg/dL

## 2023-03-16 NOTE — P.PN
Subjective





Patient is seen for follow-up for hyponatremia secondary to diabetes insipidus, 

currently improved with discontinuation of lithium and DDAVP


Status post D5W, currently off of IV fluids.


Sodiumis 142 today.


No complaints.





Objective





- Vital Signs


Vital signs: 


                                   Vital Signs











Temp  97.8 F   03/16/23 04:00


 


Pulse  86   03/16/23 11:53


 


Resp  16   03/16/23 11:53


 


BP  112/72   03/16/23 11:53


 


Pulse Ox  93 L  03/16/23 11:53


 


FiO2      








                                 Intake & Output











 03/15/23 03/16/23 03/16/23





 18:59 06:59 18:59


 


Intake Total 700  137


 


Output Total 2725 2225 1150


 


Balance -2025 -2225 -1013


 


Weight 92 kg  


 


Intake:   


 


  Oral 700  137


 


Output:   


 


  Urine 2725 2225 1150


 


Other:   


 


  Voiding Method Urinal Urinal Urinal


 


  # Bowel Movements 1 1 1














- Exam





Awake, comfortable, no acute distress.


Lungs are clear


CVS S1 and S2


Abdomen is soft, mild tenderness


Lower extremities show no edema


CNS exam is grossly intact.





- Labs


CBC & Chem 7: 


                                 03/16/23 09:50





                                 03/16/23 09:50


Labs: 


                  Abnormal Lab Results - Last 24 Hours (Table)











  03/15/23 03/16/23 03/16/23 Range/Units





  20:12 06:04 09:50 


 


RBC     (4.30-5.90)  m/uL


 


Hgb     (13.0-17.5)  gm/dL


 


Hct     (39.0-53.0)  %


 


Potassium    3.3 L  (3.5-5.1)  mmol/L


 


Chloride    113 H  ()  mmol/L


 


BUN    6 L  (9-20)  mg/dL


 


Glucose    165 H  (74-99)  mg/dL


 


POC Glucose (mg/dL)  220 H  237 H   ()  mg/dL


 


Calcium    8.3 L  (8.4-10.2)  mg/dL














  03/16/23 03/16/23 03/16/23 Range/Units





  09:50 11:40 16:39 


 


RBC  3.39 L    (4.30-5.90)  m/uL


 


Hgb  9.8 L    (13.0-17.5)  gm/dL


 


Hct  30.3 L    (39.0-53.0)  %


 


Potassium     (3.5-5.1)  mmol/L


 


Chloride     ()  mmol/L


 


BUN     (9-20)  mg/dL


 


Glucose     (74-99)  mg/dL


 


POC Glucose (mg/dL)   210 H  276 H  ()  mg/dL


 


Calcium     (8.4-10.2)  mg/dL














Assessment and Plan


Assessment: 





1. Hypernatremia associated with polyuria and free water deficit secondary to 

nephrogenic DI. Also a component of hyperglycemia. Improved with DDAVP. Lithium 

is appropriately discontinued. Sodium has improved. 


2. S/p omentectomy and right colectomy on 3/7/23


3. Hypokalemia, s/p replacement


4. Bipolar disorder on lithium, currently on hold.


Plan: 





Continue current dose of DDAVP.


Continue too hold lithium.

## 2023-03-17 VITALS — RESPIRATION RATE: 18 BRPM

## 2023-03-17 VITALS — HEART RATE: 97 BPM | SYSTOLIC BLOOD PRESSURE: 122 MMHG | TEMPERATURE: 98.7 F | DIASTOLIC BLOOD PRESSURE: 64 MMHG

## 2023-03-17 LAB
ANION GAP SERPL CALC-SCNC: 7 MMOL/L
BUN SERPL-SCNC: 9 MG/DL (ref 9–20)
CALCIUM SPEC-MCNC: 8.3 MG/DL (ref 8.4–10.2)
CHLORIDE SERPL-SCNC: 114 MMOL/L (ref 98–107)
CO2 SERPL-SCNC: 23 MMOL/L (ref 22–30)
GLUCOSE BLD-MCNC: 174 MG/DL (ref 70–110)
GLUCOSE BLD-MCNC: 254 MG/DL (ref 70–110)
GLUCOSE SERPL-MCNC: 164 MG/DL (ref 74–99)
MAGNESIUM SPEC-SCNC: 2.2 MG/DL (ref 1.6–2.3)
POTASSIUM SERPL-SCNC: 3.5 MMOL/L (ref 3.5–5.1)
SODIUM SERPL-SCNC: 144 MMOL/L (ref 137–145)

## 2023-03-17 RX ADMIN — PROPRANOLOL HYDROCHLORIDE SCH MG: 80 CAPSULE, EXTENDED RELEASE ORAL at 09:12

## 2023-03-17 RX ADMIN — INSULIN ASPART SCH UNIT: 100 INJECTION, SOLUTION INTRAVENOUS; SUBCUTANEOUS at 06:01

## 2023-03-17 RX ADMIN — PANTOPRAZOLE SODIUM SCH MG: 40 INJECTION, POWDER, FOR SOLUTION INTRAVENOUS at 09:11

## 2023-03-17 RX ADMIN — INSULIN ASPART SCH UNIT: 100 INJECTION, SOLUTION INTRAVENOUS; SUBCUTANEOUS at 13:24

## 2023-03-17 RX ADMIN — TAMSULOSIN HYDROCHLORIDE SCH MG: 0.4 CAPSULE ORAL at 09:12

## 2023-03-17 RX ADMIN — CITALOPRAM HYDROBROMIDE SCH MG: 20 TABLET ORAL at 09:12

## 2023-03-17 RX ADMIN — HEPARIN SODIUM SCH UNIT: 5000 INJECTION INTRAVENOUS; SUBCUTANEOUS at 09:11

## 2023-03-17 RX ADMIN — DOCUSATE SODIUM SCH MG: 100 CAPSULE, LIQUID FILLED ORAL at 09:12

## 2023-03-17 RX ADMIN — DESMOPRESSIN ACETATE SCH MG: 0.2 TABLET ORAL at 09:12

## 2023-03-17 RX ADMIN — LEVOTHYROXINE SODIUM SCH MCG: 50 TABLET ORAL at 06:01

## 2023-03-17 RX ADMIN — CEFEPIME HYDROCHLORIDE SCH MLS/HR: 2 INJECTION, POWDER, FOR SOLUTION INTRAVENOUS at 09:11

## 2023-03-17 NOTE — P.DS
Providers


Date of admission: 


03/06/23 13:14





Expected date of discharge: 03/17/23


Attending physician: 


Avery Ovalle MD





Consults: 





                                        





03/06/23 13:14


Consult Physician Urgent 


   Consulting Provider: Elmo Mcgowan


   Consult Reason/Comments: Bowel obstruction


   Do you want consulting provider notified?: Yes





03/06/23 18:54


Consult Physician Routine 


   Consulting Provider: Phan Smith


   Consult Reason/Comments: Medication management, need to d/c anticholanergics


   Do you want consulting provider notified?: Yes





03/12/23 12:37


Consult Physician Urgent 


   Consulting Provider: Laura Edwards


   Consult Reason/Comments: rule out diabetes insipidus, hypernatremia excess 

urinary output


   Do you want consulting provider notified?: Yes





03/13/23 17:47


Consult Physician Routine 


   Consulting Provider: Phan Smith


   Consult Reason/Comments: Lithium discontinued d/t Diabetes insipidus. Med 

recommendations


                                                please


   Do you want consulting provider notified?: Yes











Primary care physician: 


Stated None





Hospital Course: 





Discharge Diagnosis:


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


Hypokalemia


Small bowel obstruction, Dilated proximal transverse right colon status post 

right colectomy and partial omentectomy on 3/23


Acute Postoperative blood loss anemia, expected finding and stable and improving


Diabetes mellitus with hyperglycemia


Schizoaffective disorder


Anxiety and depression


OCD


Bipolar disorder


Acute kidney injury


Hyponatremia


Hypochloremia


Hypernatremia


Non-anion gap Metabolic acidosis


Leukocytosis, reactive





Hospital Course: 


Patient is a very pleasant 57-year-old male with a past medical history of type 

II non-insulin-dependent diabetes mellitus, hypertension, hyperlipidemia, GERD, 

schizoaffective disorder, anxiety, bipolar, depression, and previous inguinal 

hernia with repair.  Presented to the emergency department with a chief 

complaint of abdominal/epigastric/chest pain, nausea, vomiting, abdominal d

istention and constipation 2 weeks.  Patient reports vomiting dark black 

coffee-colored emesis and inability to have a bowel movement 2 weeks.  He 

reports pain institute diffuse abdomen radiating up into his epigastric region 

and chest.  He denies having any fevers, chills, palpitations, shortness of 

breath, cough or congestion, hemoptysis, melena, or hematochezia.  Patient 

reports decreased appetite and inability to hold down oral intake.  He does 

report decreased urinary output, but denies any retention or difficulties 

initiating a stream.  Reports concerns of dehydration.  Patient underwent full 

evaluation in the emergency department.  Labs completed and reviewed.  He was 

found to have leukocytosis with WBC count of 18.1 and normocytic anemia with 

hemoglobin of 12.6.  Coagulation profile normal findings.  BMP revealing 

hyponatremia with sodium 132 and hypochloremia with chloride 95 and acute kidney

injury with BUN 59, creatinine 2.11, and GFR of 34 as well as hyperglycemia with

glucose of 252.  Troponin less than 0.012.  EKG showing sinus tachycardia at 102

bpm with T-wave inversion in lateral leads I and aVL, T-wave inversion is new 

finding when compared to previous EKG completed 2/3/20.  Chest x-ray completed 

and report reviewed stating limited inspiration with bilateral consolidation and

right basilar atelectasis or infiltrate.  CT abdomen and pelvis without contrast

completed and radiology report reviewed showing dilated small bowel, 

cecum/ascending colon with transition point within the proximal third of the 

transverse colon with focal narrowing in none distention of the remainder of the

colon, findings possibly representing stricture versus mass, small bowel 

dilation with small bowel feces throughout suggesting delayed transit/fecal 

stasis and dilated stomach and esophagus filled with ingested contents.  

Discussed these  findings with the ED physicianin detail and patient was 

accepted for admission to general surgery unit with telemetry under our services

with consultation to general surgery.  Patient taken for colectomy on 3/7/23.  

Awaiting for return of bowel function.  Repeat abdominal x-ray completed on 

3/12/23 reviewed and radiology report showing nonspecific bowel gas pattern 

without radiographic evidence for acute process with persistent few gaseous 

dilated loops of bowel remaining.  Patient currently having bowel movements, 

able to tolerate some oral intake.  Patient developed nephrogenic diabetes 

insipidus secondary to lithium use.  Psychiatry and nephrology was consulted.  

Patient was given DDAVP.  Urine output slowly declining.  Lithium is being held.

 Patient being discharged on oral DDAVP, close follow-up with nephrology as an 

outpatient








Patient seen and examined at bedside.





Vital signs reviewed and stable. 


General: nontoxic, no distress, appears at stated age


Derm: warm, dry, dressing is dry, intact, clean


Head: atraumatic, normocephalic, symmetric


Eyes: EOMI, no lid lag, anicteric sclera


Mouth: no lip lesion, mucus membranes moist


Cardiovascular: S1S2 reg, no murmur


Lungs: CTA bilateral, no rhonchi, no rales , no accessory muscle use


Abdominal: soft,  nontender to palpation, no guarding, no appreciable organomega

ly


Ext: no gross muscle atrophy, no edema, no contractures


Neuro:  CN II-XI grossly intact, no focal neuro deficits


Psych: Alert, oriented, appropriate affect 








A total of 37 minutes of time were spent preparing this complex discharge 

summary.


Patient was discharged on 3/17/23 at 9:50. 


Patient Condition at Discharge: Stable





Plan - Discharge Summary


Discharge Rx Participant: No


New Discharge Prescriptions: 


New


   Citalopram Hydrobromide [CeleXA] 20 mg PO DAILY  tab


   cloZAPine [Clozaril] 75 mg PO HS  tab


   metroNIDAZOLE [Flagyl] 500 mg PO TID  tab


   Tamsulosin [Flomax] 0.4 mg PO PC-BRKFST #0 cap


   Insulin Detemir (Levemir) [Levemir] 5 unit SQ HS  each


   Atorvastatin [Lipitor] 10 mg PO HS  tab


   Lactulose [Cephulac] 30 gm PO DAILY  ml


   cloZAPine [Clozaril] 100 mg PO HS  tab


   Docusate [Colace] 100 mg PO BID  cap


   Desmopressin [Ddavp] 0.05 mg PO BID  tab


   INSULIN ASPART (NovoLOG) [NovoLOG (formulary)] 0 unit SQ ACHS  each





Continue


   Levothyroxine Sodium [Synthroid] 50 mcg PO DAILY 30 Days #30 tab


   amLODIPine [Norvasc] 10 mg PO DAILY


   Propranolol HCl [Propranolol HCl ER] 80 mg PO DAILY


   lamoTRIgine [LaMICtal] 150 mg PO BID


   Omeprazole [PriLOSEC] 40 mg PO DAILY





Discontinued


   cloZAPine [Clozaril] 150 mg PO HS 30 Days #45 tab


   Rosuvastatin Calcium [Crestor] 5 mg PO HS 30 Days #30 tab


   Lithium Carbonate 1,200 mg PO HS 30 Days #120 cap


   Glycopyrrolate [Robinul Forte] 2 mg PO BID 30 Days #60 tab


   Linagliptin [Tradjenta] 5 mg PO DAILY


   Cetirizine HCl [Zyrtec] 10 mg PO DAILY


   clomiPRAMINE [Anafranil] 100 mg PO BID


   Meclizine [Antivert] 25 mg PO BID


Discharge Medication List





Levothyroxine Sodium [Synthroid] 50 mcg PO DAILY 30 Days #30 tab 02/17/20 [Rx]


lamoTRIgine [LaMICtal] 150 mg PO BID 05/04/21 [History]


Omeprazole [PriLOSEC] 40 mg PO DAILY 03/06/23 [History]


Propranolol HCl [Propranolol HCl ER] 80 mg PO DAILY 03/06/23 [History]


amLODIPine [Norvasc] 10 mg PO DAILY 03/06/23 [History]


Atorvastatin [Lipitor] 10 mg PO HS  tab 03/17/23 [Rx]


Citalopram Hydrobromide [CeleXA] 20 mg PO DAILY  tab 03/17/23 [Rx]


Desmopressin [Ddavp] 0.05 mg PO BID  tab 03/17/23 [Rx]


Docusate [Colace] 100 mg PO BID  cap 03/17/23 [Rx]


INSULIN ASPART (NovoLOG) [NovoLOG (formulary)] 0 unit SQ ACHS  each 03/17/23 

[Rx]


Insulin Detemir (Levemir) [Levemir] 5 unit SQ HS  each 03/17/23 [Rx]


Lactulose [Cephulac] 30 gm PO DAILY  ml 03/17/23 [Rx]


Tamsulosin [Flomax] 0.4 mg PO PC-BRKFST #0 cap 03/17/23 [Rx]


cloZAPine [Clozaril] 75 mg PO HS  tab 03/17/23 [Rx]


cloZAPine [Clozaril] 100 mg PO HS  tab 03/17/23 [Rx]


metroNIDAZOLE [Flagyl] 500 mg PO TID  tab 03/17/23 [Rx]








Follow up Appointment(s)/Referral(s): 


Zaria Melendez MD [STAFF PHYSICIAN] - 1 Week


None,Stated [Primary Care Provider] - 1-2 days


Elmo Mcgowan MD [STAFF PHYSICIAN] - 1 Week


Patient Instructions/Handouts:  Diabetes Insipidus (DC), Bowel Obstruction (DC),

Type 2 Diabetes Management for Adults (DC)


Discharge Disposition: TRANSFER TO SNF/ECF

## 2023-03-17 NOTE — P.PN
Subjective


Progress Note Date: 03/17/23





CHIEF COMPLAINT: Colonic obstruction





HISTORY OF PRESENT ILLNESS: Patient is postop day #10 status post right 

colectomy and partial omentectomy for possible stricture of the transverse colon

and massive dilated proximal transverse right colon and small bowel.  Patient is

having multiple pudding consistency bowel movements.  Pain is controlled.  

Denies any nausea or vomiting.  He is having stools every 2 hours.  Afebrile.  

He is scheduled for discharge to [facility] today.





Patient seen and examined with Dr. Mcgowan





PHYSICAL EXAM: 


VITAL SIGNS: Reviewed.


GENERAL: Well-developed in no acute distress. 


HEENT:  No sclera icterus. Extraocular movements grossly intact.  Moist buccal 

mucosa. Head is atraumatic, normocephalic. 


ABDOMEN:  Soft.  Nondistended.  Nontender.  There is a mild rash around 

incision.





ASSESSMENT: 


1.  Massively dilated proximal transverse right colon and small bowel with 

possible stricture of the transverse colon status post right colectomy and 

partial omentectomy





PLAN: 


-Change lactulose dosing to 30 mL every 2 days.


-Patient had severe constipation and a lot of retained stool noted in the colon.

 Recommend patient stays on lactulose every 2 days even at discharge to avoid 

constipation


-Patient can be discharged from surgical standpoint when medically cleared


-Remove every other staple from incision


-GI prophylaxis Protonix and DVT prophylaxis subcu heparin





Physician Assistant note has been reviewed by physician. Signing provider agrees

with the documented findings, assessment, and plan of care. 





Objective





- Vital Signs


Vital signs: 


                                   Vital Signs











Temp  97.5 F L  03/17/23 09:10


 


Pulse  105 H  03/17/23 09:10


 


Resp  18   03/17/23 09:10


 


BP  125/86   03/17/23 09:10


 


Pulse Ox  96   03/17/23 09:10


 


FiO2      








                                 Intake & Output











 03/16/23 03/17/23 03/17/23





 18:59 06:59 18:59


 


Intake Total 137  10


 


Output Total 1150 2025 550


 


Balance -1013 -2025 -540


 


Intake:   


 


  IV   10


 


    Invasive Line 4   10


 


  Oral 137  


 


Output:   


 


  Urine 1150 2025 550


 


Other:   


 


  Voiding Method Urinal Urinal Urinal


 


  # Voids  2 1


 


  # Bowel Movements 1 1 1














- Labs


CBC & Chem 7: 


                                 03/16/23 09:50





                                 03/17/23 07:42


Labs: 


                  Abnormal Lab Results - Last 24 Hours (Table)











  03/16/23 03/16/23 03/17/23 Range/Units





  16:39 19:50 05:44 


 


Chloride     ()  mmol/L


 


Glucose     (74-99)  mg/dL


 


POC Glucose (mg/dL)  276 H  309 H  174 H  ()  mg/dL


 


Calcium     (8.4-10.2)  mg/dL














  03/17/23 03/17/23 Range/Units





  07:42 12:00 


 


Chloride  114 H   ()  mmol/L


 


Glucose  164 H   (74-99)  mg/dL


 


POC Glucose (mg/dL)   254 H  ()  mg/dL


 


Calcium  8.3 L   (8.4-10.2)  mg/dL

## 2023-03-17 NOTE — P.PN
Subjective





Patient is seen for follow-up for hyponatremia secondary to diabetes insipidus, 

currently improved with discontinuation of lithium and DDAVP


Status post D5W, currently off of IV fluids.


Sodiumis 144 today.


No complaints.





Objective





- Vital Signs


Vital signs: 


                                   Vital Signs











Temp  98.7 F   03/17/23 12:00


 


Pulse  97   03/17/23 12:00


 


Resp  18   03/17/23 12:00


 


BP  122/64   03/17/23 12:00


 


Pulse Ox  96   03/17/23 12:00


 


FiO2      








                                 Intake & Output











 03/16/23 03/17/23 03/17/23





 18:59 06:59 18:59


 


Intake Total 137  550


 


Output Total 1150 2025 550


 


Balance -1013 -2025 0


 


Intake:   


 


  IV   10


 


    Invasive Line 4   10


 


  Oral 137  540


 


Output:   


 


  Urine 1150 2025 550


 


Other:   


 


  Voiding Method Urinal Urinal Urinal


 


  # Voids  2 1


 


  # Bowel Movements 1 1 1














- Exam





Awake, comfortable, no acute distress.


Lungs are clear


CVS S1 and S2


Abdomen is soft, mild tenderness


Lower extremities show no edema


CNS exam is grossly intact.





- Labs


CBC & Chem 7: 


                                 03/16/23 09:50





                                 03/17/23 07:42


Labs: 


                  Abnormal Lab Results - Last 24 Hours (Table)











  03/16/23 03/16/23 03/17/23 Range/Units





  16:39 19:50 05:44 


 


Chloride     ()  mmol/L


 


Glucose     (74-99)  mg/dL


 


POC Glucose (mg/dL)  276 H  309 H  174 H  ()  mg/dL


 


Calcium     (8.4-10.2)  mg/dL














  03/17/23 03/17/23 Range/Units





  07:42 12:00 


 


Chloride  114 H   ()  mmol/L


 


Glucose  164 H   (74-99)  mg/dL


 


POC Glucose (mg/dL)   254 H  ()  mg/dL


 


Calcium  8.3 L   (8.4-10.2)  mg/dL














Assessment and Plan


Assessment: 





1. Hypernatremia associated with polyuria and free water deficit secondary to 

nephrogenic DI. Also a component of hyperglycemia. Improved with DDAVP. Lithium 

is appropriately discontinued. Sodium has improved. 


2. S/p omentectomy and right colectomy on 3/7/23


3. Hypokalemia, s/p replacement


4. Bipolar disorder on lithium, currently on hold.


Plan: 





Continue current dose of DDAVP.


Continue to hold lithium.


F/u as out patient in 1 week with repeat labs in 1-2 days post discharge.

## 2024-06-28 ENCOUNTER — HOSPITAL ENCOUNTER (OUTPATIENT)
Dept: HOSPITAL 47 - RADMRIMAIN | Age: 59
Discharge: HOME | End: 2024-06-28
Attending: INTERNAL MEDICINE
Payer: MEDICARE

## 2024-06-28 DIAGNOSIS — M67.813: ICD-10-CM

## 2024-06-28 DIAGNOSIS — M75.111: Primary | ICD-10-CM

## 2024-06-29 NOTE — MR
EXAMINATION TYPE: MR shoulder RT wo con

 

DATE OF EXAM: 6/28/2024

 

COMPARISON: Prior MRI right shoulder December 23, 2015

 

HISTORY: Right shoulder pain for 5-8 months with difficulty raising arm overhead.

 

TECHNIQUE: Multiplanar, multisequence imaging of the right shoulder is performed without contrast.

 

FINDINGS:

 

Rotator Cuff: Intact infraspinatus tendon. More prominent abnormal signal along the course of the dis
autumn supraspinatus tendon. Heterogeneous but intact subscapularis tendon. Rotator cuff muscle bulk is 
preserved. There is mild to moderate diffuse edema through the infraspinatus muscle bulk however note
d. 

 

Acromioclavicular Joint: Mild narrowing is less prominent versus prior study. Mild capsular hypertrop
hy also less prominent versus prior. Underlying fat plane is improved on current study.

 

Glenohumeral Joint: Moderate to large size joint effusion is more prominent versus prior. No signific
ant spurring. Some narrowing remains present.

 

Labrum: The labrum appears grossly intact given limitation of non-arthrogram study.

 

Biceps Tendon: The long head of biceps is in normal location within bicipital groove. Heterogeneous i
ncreased signal involving intracapsular portion for reference sagittal image 21 new from prior.

 

Bone marrow signal: Some subchondral cystic change involving the lateral humeral head on current stud
y.

 

Other: No additional significant abnormality is appreciated.

 

IMPRESSION: 

1. More prominent tendinosis/partial tearing of the distal supraspinatus tendon.

2. New tendinosis/partial tearing of the intracapsular portion long head of biceps tendon.

3. Moderate to large size joint effusion is more prominent versus prior.

## 2024-10-09 ENCOUNTER — HOSPITAL ENCOUNTER (OUTPATIENT)
Dept: HOSPITAL 47 - ORWHC2ENDO | Age: 59
Discharge: NURSING, REHAB OR LONG-TERM CARE | End: 2024-10-09
Attending: SURGERY
Payer: MEDICARE

## 2024-10-09 VITALS — HEART RATE: 72 BPM | DIASTOLIC BLOOD PRESSURE: 91 MMHG | SYSTOLIC BLOOD PRESSURE: 175 MMHG | RESPIRATION RATE: 20 BRPM

## 2024-10-09 VITALS — BODY MASS INDEX: 34.3 KG/M2

## 2024-10-09 VITALS — TEMPERATURE: 96.9 F

## 2024-10-09 PROCEDURE — 43247 EGD REMOVE FOREIGN BODY: CPT

## 2024-10-09 RX ADMIN — POTASSIUM CHLORIDE SCH MLS/HR: 14.9 INJECTION, SOLUTION INTRAVENOUS at 10:21

## 2024-10-09 RX ADMIN — POTASSIUM CHLORIDE ONE MLS: 14.9 INJECTION, SOLUTION INTRAVENOUS at 10:21

## 2024-10-09 NOTE — P.GSHP
History of Present Illness


H&P Date: 10/09/24














CHIEF COMPLAINT: PEG tube status





HISTORY OF PRESENT ILLNESS: The patient is a 59-year-old male with history of 

sepsis including perforated diverticulitis and prolonged intubation who had a 

feeding tube placed at outside facility.  Patient presents after tolerating oral

feeds without use of feeding tube.  Patient presents for removal of his feeding 

tube.





PAST MEDICAL HISTORY: 


Please see list.





PAST SURGICAL HISTORY: 


Please see list.





MEDICATIONS: 


Please see list.





ALLERGIES:  Please see list. 





SOCIAL HISTORY: No illicit drug use





FAMILY HISTORY: No reports of Crohn disease or ulcerative colitis. 





REVIEW OF ORGAN SYSTEMS: 


CONSTITUTIONAL: No reports of fevers or chills. 


GI: History of perforated diverticulitis, colostomy and feeding tube





PHYSICAL EXAM: 


VITAL SIGNS:  Stable


GENERAL: Well-developed pleasant in no acute distress. 


HEENT: No scleral icterus. Extraocular movements grossly


intact. Moist buccal mucosa. 


NECK: Supple without lymphadenopathy. 


CHEST: Unlabored respirations. Equal bilateral excursions. 


CARDIOVASCULAR: Regular rate and rhythm. Distal 2+ pulses. 


ABDOMEN: Soft, nondistended.  PEG tube present


MUSCULOSKELETAL: No clubbing, cyanosis, or edema. 





ASSESSMENT: 


1.  PEG tube status





PLAN: 


1. Recommend proceeding with EGD with removal of PEG tube per patient request





Past Medical History


Past Medical History: Diabetes Mellitus, GERD/Reflux, Hyperlipidemia, 

Hypertension, Sleep Apnea/CPAP/BIPAP


Additional Past Medical History / Comment(s): Anemia.


History of Any Multi-Drug Resistant Organisms: MRSA


Date of last positivie culture/infection: Unknown


MDRO Source:: Wound


Past Surgical History: Unable to Obtain, Hernia Repair


Past Anesthesia/Blood Transfusion Reactions: No Reported Reaction, Unable to 

Obtain


Past Psychological History: Anxiety, Bipolar, Depression, Schizoaffective 

Disorder


Additional Psychological History / Comment(s): OCD.


Smoking Status: Never smoker


Past Alcohol Use History: None Reported


Past Drug Use History: None Reported





- Past Family History


  ** Father


Family Medical History: Congestive Heart Failure (CHF), Coronary Artery Disease 

(CAD), Diabetes Mellitus





  ** Mother


Family Medical History: Cancer





Medications and Allergies


                                Home Medications











 Medication  Instructions  Recorded  Confirmed  Type


 


Propranolol HCl [Propranolol HCl 80 mg PO BID 03/06/23 10/08/24 History





ER]    


 


Citalopram Hydrobromide [CeleXA] 20 mg PO DAILY  tab 03/17/23 10/08/24 Rx


 


Acetaminophen [Tylenol Arthritis] 650 mg PO Q6H PRN 10/08/24 10/08/24 History


 


Ascorbic Acid [Vitamin C] 500 mg PO HS 10/08/24 10/08/24 History


 


Atorvastatin Calcium 10 mg PO HS 10/08/24 10/08/24 History


 


Cetirizine HCl 10 mg PO DAILY 10/08/24 10/08/24 History


 


Cholestyramine/Aspartame 4 gm PO BID 10/08/24 10/08/24 History





[Cholestyramine Light Packet]    


 


Desmopressin [Ddavp] 0.2 mg PO BID 10/08/24 10/08/24 History


 


Diphenoxylate HCl/Atropine 2 tab PO BID 10/08/24 10/08/24 History





[Lomotil 2.5-0.025 mg Tablet]    


 


Famotidine 20 mg PO HS 10/08/24 10/08/24 History


 


Ferrous Sulfate [Iron] 325 mg PO HS 10/08/24 10/08/24 History


 


Insulin Aspart (Niacinamide) 5 units SQ TID-W/MEALS 10/08/24 10/08/24 History





[Fiasp 100 Unit/ml Vial]    


 


Ipratropium-Albuterol Nebulize 3 ml INHALATION Q4H PRN 10/08/24 10/08/24 History





[Duoneb 0.5 mg-3 mg/3 ml Soln]    


 


Melatonin 5 mg PO HS 10/08/24 10/08/24 History


 


Simethicone [Mylanta Gas Minis] 125 mg PO TID-W/MEALS 10/08/24 10/08/24 History


 


Sodium Bicarbonate 1,300 mg PO BID 10/08/24 10/08/24 History


 


Tamsulosin [Flomax] 0.4 mg PO HS 10/08/24 10/08/24 History


 


cloZAPine [cloZAPine ODT] 200 mg PO HS 10/08/24 10/08/24 History


 


diphenhydrAMINE HCL [Benadryl 50 mg PO HS 10/08/24 10/08/24 History





Allergy]    


 


lisinopriL [Zestril] 10 mg PO DAILY 10/08/24 10/08/24 History


 


rOPINIRole HCL 0.25 mg PO HS 10/08/24 10/08/24 History


 


sitaGLIPtin [Januvia] 100 mg PO DAILY 10/08/24 10/08/24 History


 


traZODone HCL [Desyrel] 75 mg PO HS 10/08/24 10/08/24 History








                                    Allergies











Allergy/AdvReac Type Severity Reaction Status Date / Time


 


Penicillins Allergy  Unknown Verified 10/08/24 12:09





   Childhood  


 


risperidone [From Risperdal] Allergy  Unknown Verified 10/08/24 12:09


 


divalproex sodium AdvReac  Anxiety, Verified 10/08/24 12:09





[From Depakote]   weight gain

## 2024-10-09 NOTE — P.PCN
Date of Procedure: 10/09/24


Description of Procedure: 








PREOPERATIVE DIAGNOSIS:


Gastrostomy tube status.


History of severe protein malnutrition


Status post colectomy with colostomy from perforated diverticulitis


History of sepsis


History of tracheostomy





POSTOPERATIVE DIAGNOSIS:


Gastrojejunostomy feeding tube status


History of severe protein malnutrition


Status post colectomy with colostomy from perforated diverticulitis


History of sepsis


History of tracheostomy


Gastritis


Armstrong's esophagus





PROCEDURE:


1. Esophagogastroduodenoscopy with removal of gastrojejunostomy feeding tube, 21

French





SURGEON: Bambi Mae MD


ANESTHESIA: MAC.


EBL: 0-mL





INDICATIONS:


The patient is a 59-year-old male with complicated history of tracheostomy, 

sepsis, perforated diverticulitis and placement of feeding tube while on 

prolonged ventilation.  Patient presents for removal of his feeding tube that he

can tolerate oral feeds.  Benefits and risks were obtained.





DESCRIPTION:


The patient was brought into the endoscopy suite and laid in supine position.  A

timeout protocol was confirmed with the team.  After adequate IV sedation a bite

block was placed. An Olympus gastroscope was passed along the posterior 

oropharynx down the distal esophagus.  Presence of Armstrong's esophagus over 3 cm

were found.  The stomach was entered and feeding tube was found with partially 

deflated balloon and extension of tubing into the duodenum consistent with 

gastrojejunostomy feeding tube.  The scope was passed through the jejunum 

confirming no further balloon along the distal port.





The adapter was deflated of any residual fluid over 3 to 5 cc.  Under 

visualization, the gastrojejunostomy tube was removed from the abdomen without 

sequelae or bleeding.  Immediately, the mucosa of the gastrocutaneous tract was 

closed with insufflation of the stomach achieved.  An endoscopic image of the 

gastrostomy tube removal site was obtained.





The patient tolerated the procedure well.  





Findings:


1.  Successful removal of gastrojejunostomy feeding tube, 21 French





Disposition:


1. Keep dressing for 48 to 72 hours





Plan - Discharge Summary


Discharge Rx Participant: No


New Discharge Prescriptions: 


Continue


   Propranolol HCl [Propranolol HCl ER] 80 mg PO BID


   Citalopram Hydrobromide [CeleXA] 20 mg PO DAILY  tab


   Desmopressin [Ddavp] 0.2 mg PO BID


   cloZAPine [cloZAPine ODT] 200 mg PO HS


   Tamsulosin [Flomax] 0.4 mg PO HS


   Diphenoxylate HCl/Atropine [Lomotil 2.5-0.025 mg Tablet] 2 tab PO BID


   Ascorbic Acid [Vitamin C] 500 mg PO HS


   Simethicone [Mylanta Gas Minis] 125 mg PO TID-W/MEALS


   Insulin Aspart (Niacinamide) [Fiasp 100 Unit/ml Vial] 5 units SQ TID-W/MEALS


   Famotidine 20 mg PO HS


   Cholestyramine/Aspartame [Cholestyramine Light Packet] 4 gm PO BID


   Cetirizine HCl 10 mg PO DAILY


   Atorvastatin Calcium 10 mg PO HS


   traZODone HCL [Desyrel] 75 mg PO HS


   sitaGLIPtin [Januvia] 100 mg PO DAILY


   lisinopriL [Zestril] 10 mg PO DAILY


   Melatonin 5 mg PO HS


   rOPINIRole HCL 0.25 mg PO HS


   diphenhydrAMINE HCL [Benadryl] 50 mg PO HS


   Sodium Bicarbonate 1,300 mg PO BID


   Ipratropium-Albuterol Nebulize [Duoneb 0.5 mg-3 mg/3 ml Soln] 3 ml INHALATION

Q4H PRN


     PRN Reason: Shortness Of Breath


   Ferrous Sulfate [Iron] 325 mg PO HS


   Acetaminophen [Tylenol Arthritis] 650 mg PO Q6H PRN


     PRN Reason: Pain


Discharge Medication List





Propranolol HCl [Propranolol HCl ER] 80 mg PO BID 03/06/23 [History]


Citalopram Hydrobromide [CeleXA] 20 mg PO DAILY  tab 03/17/23 [Rx]


Acetaminophen [Tylenol Arthritis] 650 mg PO Q6H PRN 10/08/24 [History]


Ascorbic Acid [Vitamin C] 500 mg PO HS 10/08/24 [History]


Atorvastatin Calcium 10 mg PO HS 10/08/24 [History]


Cetirizine HCl 10 mg PO DAILY 10/08/24 [History]


Cholestyramine/Aspartame [Cholestyramine Light Packet] 4 gm PO BID 10/08/24 

[History]


Desmopressin [Ddavp] 0.2 mg PO BID 10/08/24 [History]


Diphenoxylate HCl/Atropine [Lomotil 2.5-0.025 mg Tablet] 2 tab PO BID 10/08/24 

[History]


Famotidine 20 mg PO HS 10/08/24 [History]


Ferrous Sulfate [Iron] 325 mg PO HS 10/08/24 [History]


Insulin Aspart (Niacinamide) [Fiasp 100 Unit/ml Vial] 5 units SQ TID-W/MEALS 

10/08/24 [History]


Ipratropium-Albuterol Nebulize [Duoneb 0.5 mg-3 mg/3 ml Soln] 3 ml INHALATION 

Q4H PRN 10/08/24 [History]


Melatonin 5 mg PO HS 10/08/24 [History]


Simethicone [Mylanta Gas Minis] 125 mg PO TID-W/MEALS 10/08/24 [History]


Sodium Bicarbonate 1,300 mg PO BID 10/08/24 [History]


Tamsulosin [Flomax] 0.4 mg PO HS 10/08/24 [History]


cloZAPine [cloZAPine ODT] 200 mg PO HS 10/08/24 [History]


diphenhydrAMINE HCL [Benadryl] 50 mg PO HS 10/08/24 [History]


lisinopriL [Zestril] 10 mg PO DAILY 10/08/24 [History]


rOPINIRole HCL 0.25 mg PO HS 10/08/24 [History]


sitaGLIPtin [Januvia] 100 mg PO DAILY 10/08/24 [History]


traZODone HCL [Desyrel] 75 mg PO HS 10/08/24 [History]








Follow up Appointment(s)/Referral(s): 


Bambi Mae MD [STAFF PHYSICIAN] - As Needed


Patient Instructions/Handouts:  *Surgery [facility] - (Anesthesia) Discharge 

Instructions Outpatient Surgery


Activity/Diet/Wound Care/Special Instructions: 


.KEEP DRESSING DRY ND INTACT FOR 48 TO 72 HR REUSUME NORMAL DIET AND ACTIVITY


Discharge Disposition: TRANSFER TO SNF/ECF

## 2024-11-20 ENCOUNTER — HOSPITAL ENCOUNTER (OUTPATIENT)
Dept: HOSPITAL 47 - EC | Age: 59
Setting detail: OBSERVATION
LOS: 1 days | Discharge: NURSING, REHAB OR LONG-TERM CARE | End: 2024-11-21
Attending: HOSPITALIST | Admitting: HOSPITALIST
Payer: MEDICARE

## 2024-11-20 DIAGNOSIS — I10: ICD-10-CM

## 2024-11-20 DIAGNOSIS — Z79.899: ICD-10-CM

## 2024-11-20 DIAGNOSIS — M25.512: ICD-10-CM

## 2024-11-20 DIAGNOSIS — Z79.84: ICD-10-CM

## 2024-11-20 DIAGNOSIS — G89.29: ICD-10-CM

## 2024-11-20 DIAGNOSIS — E66.9: ICD-10-CM

## 2024-11-20 DIAGNOSIS — Z88.0: ICD-10-CM

## 2024-11-20 DIAGNOSIS — F25.0: ICD-10-CM

## 2024-11-20 DIAGNOSIS — Z82.49: ICD-10-CM

## 2024-11-20 DIAGNOSIS — F41.9: ICD-10-CM

## 2024-11-20 DIAGNOSIS — K21.9: ICD-10-CM

## 2024-11-20 DIAGNOSIS — Z79.4: ICD-10-CM

## 2024-11-20 DIAGNOSIS — G47.30: ICD-10-CM

## 2024-11-20 DIAGNOSIS — I16.0: Primary | ICD-10-CM

## 2024-11-20 DIAGNOSIS — E78.5: ICD-10-CM

## 2024-11-20 DIAGNOSIS — E11.9: ICD-10-CM

## 2024-11-20 LAB
ALBUMIN SERPL-MCNC: 3.8 G/DL (ref 3.5–5)
ALP SERPL-CCNC: 114 U/L (ref 38–126)
ALT SERPL-CCNC: 34 U/L (ref 4–49)
ANION GAP SERPL CALC-SCNC: 11 MMOL/L
APTT BLD: 25.7 SEC (ref 22–30)
AST SERPL-CCNC: 27 U/L (ref 17–59)
BASOPHILS # BLD AUTO: 0 K/UL (ref 0–0.2)
BASOPHILS NFR BLD AUTO: 0 %
BUN SERPL-SCNC: 21 MG/DL (ref 9–20)
CALCIUM SPEC-MCNC: 9.1 MG/DL (ref 8.4–10.2)
CHLORIDE SERPL-SCNC: 107 MMOL/L (ref 98–107)
CO2 SERPL-SCNC: 18 MMOL/L (ref 22–30)
EOSINOPHIL # BLD AUTO: 0.1 K/UL (ref 0–0.7)
EOSINOPHIL NFR BLD AUTO: 1 %
ERYTHROCYTE [DISTWIDTH] IN BLOOD BY AUTOMATED COUNT: 4.92 M/UL (ref 4.3–5.9)
ERYTHROCYTE [DISTWIDTH] IN BLOOD: 14.4 % (ref 11.5–15.5)
GLUCOSE BLD-MCNC: 199 MG/DL (ref 70–110)
GLUCOSE SERPL-MCNC: 238 MG/DL (ref 74–99)
HCT VFR BLD AUTO: 42.7 % (ref 39–53)
HGB BLD-MCNC: 14.5 GM/DL (ref 13–17.5)
INR PPP: 0.9 (ref ?–1.2)
LYMPHOCYTES # SPEC AUTO: 1.3 K/UL (ref 1–4.8)
LYMPHOCYTES NFR SPEC AUTO: 13 %
MAGNESIUM SPEC-SCNC: 2.2 MG/DL (ref 1.6–2.3)
MCH RBC QN AUTO: 29.5 PG (ref 25–35)
MCHC RBC AUTO-ENTMCNC: 34 G/DL (ref 31–37)
MCV RBC AUTO: 86.8 FL (ref 80–100)
MONOCYTES # BLD AUTO: 0.5 K/UL (ref 0–1)
MONOCYTES NFR BLD AUTO: 5 %
NEUTROPHILS # BLD AUTO: 7.9 K/UL (ref 1.3–7.7)
NEUTROPHILS NFR BLD AUTO: 80 %
PH UR: 6 [PH] (ref 5–8)
PLATELET # BLD AUTO: 139 K/UL (ref 150–450)
POTASSIUM SERPL-SCNC: 4 MMOL/L (ref 3.5–5.1)
PROT SERPL-MCNC: 6.5 G/DL (ref 6.3–8.2)
PT BLD: 10.2 SEC (ref 10–12.5)
SODIUM SERPL-SCNC: 136 MMOL/L (ref 137–145)
SP GR UR: 1.02 (ref 1–1.03)
UROBILINOGEN UR QL STRIP: <2 MG/DL (ref ?–2)
WBC # BLD AUTO: 9.8 K/UL (ref 3.8–10.6)

## 2024-11-20 PROCEDURE — 36415 COLL VENOUS BLD VENIPUNCTURE: CPT

## 2024-11-20 PROCEDURE — 85610 PROTHROMBIN TIME: CPT

## 2024-11-20 PROCEDURE — 85730 THROMBOPLASTIN TIME PARTIAL: CPT

## 2024-11-20 PROCEDURE — 93005 ELECTROCARDIOGRAM TRACING: CPT

## 2024-11-20 PROCEDURE — 99285 EMERGENCY DEPT VISIT HI MDM: CPT

## 2024-11-20 PROCEDURE — 96374 THER/PROPH/DIAG INJ IV PUSH: CPT

## 2024-11-20 PROCEDURE — 81003 URINALYSIS AUTO W/O SCOPE: CPT

## 2024-11-20 PROCEDURE — 70450 CT HEAD/BRAIN W/O DYE: CPT

## 2024-11-20 PROCEDURE — 80053 COMPREHEN METABOLIC PANEL: CPT

## 2024-11-20 PROCEDURE — 80048 BASIC METABOLIC PNL TOTAL CA: CPT

## 2024-11-20 PROCEDURE — 84484 ASSAY OF TROPONIN QUANT: CPT

## 2024-11-20 PROCEDURE — 85025 COMPLETE CBC W/AUTO DIFF WBC: CPT

## 2024-11-20 PROCEDURE — 83735 ASSAY OF MAGNESIUM: CPT

## 2024-11-20 RX ADMIN — CHOLESTYRAMINE SCH GM: 4 POWDER, FOR SUSPENSION ORAL at 21:27

## 2024-11-20 RX ADMIN — TAMSULOSIN HYDROCHLORIDE SCH MG: 0.4 CAPSULE ORAL at 21:25

## 2024-11-20 RX ADMIN — INSULIN ASPART SCH UNIT: 100 INJECTION, SOLUTION INTRAVENOUS; SUBCUTANEOUS at 18:21

## 2024-11-20 RX ADMIN — PROPRANOLOL HYDROCHLORIDE SCH MG: 40 TABLET ORAL at 21:27

## 2024-11-20 RX ADMIN — DIMETHICONE SCH MG: 80 TABLET, CHEWABLE ORAL at 18:37

## 2024-11-20 RX ADMIN — Medication SCH MG: at 21:25

## 2024-11-20 RX ADMIN — LABETALOL HYDROCHLORIDE STA MG: 5 INJECTION, SOLUTION INTRAVENOUS at 14:15

## 2024-11-20 RX ADMIN — Medication SCH MG: at 21:27

## 2024-11-20 RX ADMIN — OXYCODONE HYDROCHLORIDE AND ACETAMINOPHEN SCH MG: 500 TABLET ORAL at 21:25

## 2024-11-20 RX ADMIN — ATORVASTATIN CALCIUM SCH MG: 10 TABLET, FILM COATED ORAL at 21:26

## 2024-11-20 RX ADMIN — FAMOTIDINE SCH MG: 20 TABLET, FILM COATED ORAL at 21:26

## 2024-11-20 RX ADMIN — Medication SCH MG: at 21:26

## 2024-11-20 RX ADMIN — DESMOPRESSIN ACETATE SCH MG: 0.2 TABLET ORAL at 21:27

## 2024-11-21 VITALS
DIASTOLIC BLOOD PRESSURE: 121 MMHG | HEART RATE: 76 BPM | RESPIRATION RATE: 20 BRPM | SYSTOLIC BLOOD PRESSURE: 187 MMHG | TEMPERATURE: 97.9 F

## 2024-11-21 LAB
ANION GAP SERPL CALC-SCNC: 6 MMOL/L
BUN SERPL-SCNC: 21 MG/DL (ref 9–20)
CALCIUM SPEC-MCNC: 8.8 MG/DL (ref 8.4–10.2)
CHLORIDE SERPL-SCNC: 106 MMOL/L (ref 98–107)
CO2 SERPL-SCNC: 25 MMOL/L (ref 22–30)
GLUCOSE BLD-MCNC: 165 MG/DL (ref 70–110)
GLUCOSE BLD-MCNC: 206 MG/DL (ref 70–110)
GLUCOSE SERPL-MCNC: 178 MG/DL (ref 74–99)
POTASSIUM SERPL-SCNC: 3.7 MMOL/L (ref 3.5–5.1)
SODIUM SERPL-SCNC: 137 MMOL/L (ref 137–145)

## 2024-11-21 RX ADMIN — CITALOPRAM HYDROBROMIDE SCH MG: 20 TABLET ORAL at 08:05

## 2024-11-21 RX ADMIN — LINAGLIPTIN SCH MG: 5 TABLET, FILM COATED ORAL at 08:05

## 2024-11-21 RX ADMIN — LORATADINE SCH MG: 10 TABLET ORAL at 08:06

## 2025-01-14 ENCOUNTER — HOSPITAL ENCOUNTER (EMERGENCY)
Dept: HOSPITAL 47 - EC | Age: 60
Discharge: OTHER INSTITUTION | End: 2025-01-14
Payer: MEDICARE

## 2025-01-14 VITALS — HEART RATE: 89 BPM | RESPIRATION RATE: 16 BRPM

## 2025-01-14 VITALS — DIASTOLIC BLOOD PRESSURE: 81 MMHG | TEMPERATURE: 98.9 F | SYSTOLIC BLOOD PRESSURE: 116 MMHG

## 2025-01-14 DIAGNOSIS — T81.30XA: Primary | ICD-10-CM

## 2025-01-14 DIAGNOSIS — R50.82: ICD-10-CM

## 2025-01-14 DIAGNOSIS — Z88.8: ICD-10-CM

## 2025-01-14 DIAGNOSIS — Z88.0: ICD-10-CM

## 2025-01-14 LAB
ALBUMIN SERPL-MCNC: 3.2 G/DL (ref 3.5–5)
ALP SERPL-CCNC: 153 U/L (ref 38–126)
ALT SERPL-CCNC: 16 U/L (ref 4–49)
ANION GAP SERPL CALC-SCNC: 8 MMOL/L
APTT BLD: 23.8 SEC (ref 22–30)
AST SERPL-CCNC: 19 U/L (ref 17–59)
BASOPHILS # BLD AUTO: 0.1 K/UL (ref 0–0.2)
BASOPHILS NFR BLD AUTO: 0 %
BUN SERPL-SCNC: 15 MG/DL (ref 9–20)
CALCIUM SPEC-MCNC: 8.9 MG/DL (ref 8.4–10.2)
CHLORIDE SERPL-SCNC: 101 MMOL/L (ref 98–107)
CO2 SERPL-SCNC: 30 MMOL/L (ref 22–30)
EOSINOPHIL # BLD AUTO: 0.1 K/UL (ref 0–0.7)
EOSINOPHIL NFR BLD AUTO: 0 %
ERYTHROCYTE [DISTWIDTH] IN BLOOD BY AUTOMATED COUNT: 3.95 M/UL (ref 4.3–5.9)
ERYTHROCYTE [DISTWIDTH] IN BLOOD: 13.7 % (ref 11.5–15.5)
GLUCOSE SERPL-MCNC: 273 MG/DL (ref 74–99)
GLUCOSE UR QL: (no result)
HCT VFR BLD AUTO: 35.6 % (ref 39–53)
HGB BLD-MCNC: 11.8 GM/DL (ref 13–17.5)
INR PPP: 0.9 (ref ?–1.2)
LYMPHOCYTES # SPEC AUTO: 1.1 K/UL (ref 1–4.8)
LYMPHOCYTES NFR SPEC AUTO: 7 %
MCH RBC QN AUTO: 30 PG (ref 25–35)
MCHC RBC AUTO-ENTMCNC: 33.2 G/DL (ref 31–37)
MCV RBC AUTO: 90.1 FL (ref 80–100)
MONOCYTES # BLD AUTO: 0.5 K/UL (ref 0–1)
MONOCYTES NFR BLD AUTO: 3 %
NEUTROPHILS # BLD AUTO: 12.4 K/UL (ref 1.3–7.7)
NEUTROPHILS NFR BLD AUTO: 87 %
PH UR: 6.5 [PH] (ref 5–8)
PLATELET # BLD AUTO: 412 K/UL (ref 150–450)
POTASSIUM SERPL-SCNC: 3.7 MMOL/L (ref 3.5–5.1)
PROT SERPL-MCNC: 6.1 G/DL (ref 6.3–8.2)
PROT UR QL: (no result)
PT BLD: 10.4 SEC (ref 10–12.5)
RBC UR QL: 4 /HPF (ref 0–5)
SODIUM SERPL-SCNC: 139 MMOL/L (ref 137–145)
SP GR UR: 1.02 (ref 1–1.03)
UROBILINOGEN UR QL STRIP: 2 MG/DL (ref ?–2)
WBC # BLD AUTO: 14.2 K/UL (ref 3.8–10.6)
WBC #/AREA URNS HPF: 1 /HPF (ref 0–5)

## 2025-01-14 PROCEDURE — 85025 COMPLETE CBC W/AUTO DIFF WBC: CPT

## 2025-01-14 PROCEDURE — 80053 COMPREHEN METABOLIC PANEL: CPT

## 2025-01-14 PROCEDURE — 87636 SARSCOV2 & INF A&B AMP PRB: CPT

## 2025-01-14 PROCEDURE — 96374 THER/PROPH/DIAG INJ IV PUSH: CPT

## 2025-01-14 PROCEDURE — 81001 URINALYSIS AUTO W/SCOPE: CPT

## 2025-01-14 PROCEDURE — 74177 CT ABD & PELVIS W/CONTRAST: CPT

## 2025-01-14 PROCEDURE — 36415 COLL VENOUS BLD VENIPUNCTURE: CPT

## 2025-01-14 PROCEDURE — 85610 PROTHROMBIN TIME: CPT

## 2025-01-14 PROCEDURE — 71046 X-RAY EXAM CHEST 2 VIEWS: CPT

## 2025-01-14 PROCEDURE — 93005 ELECTROCARDIOGRAM TRACING: CPT

## 2025-01-14 PROCEDURE — 85730 THROMBOPLASTIN TIME PARTIAL: CPT

## 2025-01-14 PROCEDURE — 99285 EMERGENCY DEPT VISIT HI MDM: CPT

## 2025-01-14 PROCEDURE — 83605 ASSAY OF LACTIC ACID: CPT

## 2025-01-14 PROCEDURE — 87040 BLOOD CULTURE FOR BACTERIA: CPT

## 2025-01-14 PROCEDURE — 96375 TX/PRO/DX INJ NEW DRUG ADDON: CPT

## 2025-01-14 RX ADMIN — CEFTRIAXONE SODIUM STA MG: 1 INJECTION, POWDER, FOR SOLUTION INTRAMUSCULAR; INTRAVENOUS at 15:07

## 2025-01-14 RX ADMIN — CEFTRIAXONE SODIUM STA MG: 1 INJECTION, POWDER, FOR SOLUTION INTRAMUSCULAR; INTRAVENOUS at 15:08

## 2025-01-14 RX ADMIN — HYDROMORPHONE HYDROCHLORIDE STA MG: 1 INJECTION, SOLUTION INTRAMUSCULAR; INTRAVENOUS; SUBCUTANEOUS at 20:20

## 2025-01-14 NOTE — CT
EXAMINATION TYPE: CT abdomen pelvis w con

 

DATE OF EXAM: 1/14/2025 6:30 PM

 

COMPARISON: None. 

 

CLINICAL INDICATION: Male, 59 years old with history of Abdominal pain, ostomy post op issues

 

TECHNIQUE: Axial images were obtained from above the diaphragm to the pubic rami in the axial plane a
t 5 mm thick sections.  Reconstructed images are reviewed on the computer in the coronal plane.  

CONTRAST:  100 mL of Isovue 300. Study performed without Oral Contrast

DLP: 1828.1 mGycm, Automated exposure control for dose reduction was used.

 

FINDINGS:

 

Limited CT sections are obtained the lung bases.  Calcified granulomas in the left lateral sulcus1.

 

CT ABDOMEN:

 

Liver: There is mild fatty infiltration of the liver.

 

Spleen: Normal

 

Pancreas: Normal

 

Adrenal glands: The adrenal glands are normal.

 

Gallbladder: Normal  

 

Kidneys: No masses are evident. No hydronephrosis is present.   No cysts are present.  Delayed images
 were obtained through the kidneys, which remain unremarkable.

 

Aorta: Vascular calcification is within the aorta. 

 

Inferior vena cava: Normal.

 

CT PELVIS: 

Bowel surgery is evident within the midabdomen. No obvious stenosis at the anastomosis is identified.
 Ostomy is in the right midabdomen. A loop of bowel just prior to entering the ostomy has a small balbir
iber. The zone of transition appears to be the small bowel slightly more superior, example image seri
es 201 image 59. There are multiple dilated fluid-filled small bowel loops with scattered air-fluid l
evels within the upper abdomen including anastomosis site. Differential should include postoperative 
ileus and partial small bowel obstruction. Right hemicolon appears resected. 

 

No oral contrast was utilized. Colon is decompressed

 

Appendix: Surgically absent. 

 

Urinary bladder: Normal. 

 

Genitourinary structures: Prostate appears normal

 

Osseous structures: No suspicious lytic or sclerotic lesions.

 

IMPRESSION:

1.  Multiple fluid-filled prominent small bowel loops at the anastomosis extending towards the ostomy
 site.

2. Within the intraperitoneal portion there is a zone of transition as the loop of bowel approaches t
he ostomy site. Differential would include postop ileus and partial small bowel obstruction

 

[facility], Workstation: [id number], 1/14/2025 6:39 PM

## 2025-01-14 NOTE — ED
General Adult HPI





- General


Chief complaint: Fever


Stated complaint: Post-op comp


Time Seen by Provider: 01/14/25 14:25


Source: patient, EMS, RN notes reviewed, old records reviewed


Mode of arrival: EMS


Limitations: no limitations





- History of Present Illness


Initial comments: 





This is a 59-year-old male who presents to the emergency department from nursing

home.  Patient was discharged yesterday from [facility] after he had 

the first surgery of an ileostomy reversal.  Patient got to the nursing home 

about 24 hours ago and since then the wound dehisced and all the staples are 

gone.  Patient also spiked to 100.9 fever.  Patient is not sure if they gave him

Tylenol or Motrin before he left.  Patient states he does feel little bit short 

of breath as well.  Patient denies any dysuria hematuria.  Patient denies any 

vomiting or diarrhea.  Patient states his abdomen hurts where they did the 

surgery but he does not think it is worse than it was postsurgery.  Patient 

denies any chest pain or palpitations.





- Related Data


                                Home Medications











 Medication  Instructions  Recorded  Confirmed


 


Acetaminophen [Tylenol Arthritis] 650 mg PO Q6H PRN 10/08/24 01/14/25


 


Ascorbic Acid [Vitamin C] 500 mg PO HS 10/08/24 01/14/25


 


Atorvastatin Calcium 10 mg PO HS 10/08/24 01/14/25


 


Cetirizine HCl 10 mg PO DAILY 10/08/24 01/14/25


 


Cholestyramine/Aspartame 4 gm PO BID PRN 10/08/24 01/14/25





[Cholestyramine Light Packet]   


 


Desmopressin [Ddavp] 0.2 mg PO BID 10/08/24 01/14/25


 


Famotidine 20 mg PO HS 10/08/24 01/14/25


 


Ferrous Sulfate [Iron] 325 mg PO DAILY 10/08/24 01/14/25


 


Insulin Aspart (Niacinamide) 7 units SQ TID-W/MEALS 10/08/24 01/14/25





[Fiasp 100 Unit/ml Vial]   


 


Melatonin 5 mg PO HS PRN 10/08/24 01/14/25


 


Tamsulosin [Flomax] 0.4 mg PO HS 10/08/24 01/14/25


 


diphenhydrAMINE HCL [Benadryl] 25 mg PO HS PRN 10/08/24 01/14/25


 


rOPINIRole HCL 0.25 mg PO HS 10/08/24 01/14/25


 


sitaGLIPtin [Januvia] 100 mg PO DAILY 10/08/24 01/14/25


 


traZODone HCL [Desyrel] 75 mg PO HS@2000 10/08/24 01/14/25


 


Propranolol HCl 80 mg PO BID@0700,1900 11/20/24 01/14/25


 


Sodium Bicarbonate Tab 650 mg PO BID 11/20/24 01/14/25


 


cloZAPine [Clozaril] 200 mg PO HS 11/20/24 01/14/25


 


Butalb/Acetaminophen/Caffeine 1 cap PO Q4H PRN 01/14/25 01/14/25





[Fioricet -40 mg Capsule]   


 


Cephalexin [Keflex] 500 mg PO QID@07,13,19,23 01/14/25 01/14/25


 


Diphenoxylate HCl/Atropine 2 tab PO BID 01/14/25 01/14/25





[Lomotil 2.5-0.025 mg Tablet]   


 


HYDROcodone/APAP 5-325MG [Norco 1 tab PO Q6H PRN 01/14/25 01/14/25





5-325]   


 


Insulin Aspart (Niacinamide) See Protocol SQ ACHS@07,11,16,20 01/14/25 01/14/25





[Fiasp 100 Unit/ml Flextouch Pen]   


 


Naloxone 0.4mg/Ml Injection 0.4 mg IV ONCE PRN 01/14/25 01/14/25





Solution   


 


Naloxone HCl [Narcan] 4 mg NASAL ONCE PRN 01/14/25 01/14/25


 


Simethicone Chew [Mylicon Chew] 80 mg PO ACHS@06,11,16,20 01/14/25 01/14/25


 


amLODIPine [Norvasc] 5 mg PO DAILY 01/14/25 01/14/25


 


cloNIDine HCL [Catapres] 0.1 mg PO DAILY PRN 01/14/25 01/14/25


 


lisinopriL [Zestril] 20 mg PO DAILY 01/14/25 01/14/25








                                  Previous Rx's











 Medication  Instructions  Recorded


 


Citalopram Hydrobromide [CeleXA] 20 mg PO DAILY  tab 03/17/23











                                    Allergies











Allergy/AdvReac Type Severity Reaction Status Date / Time


 


Penicillins Allergy  Unknown Verified 01/14/25 14:49





   Childhood  


 


risperidone [From Risperdal] Allergy  Unknown Verified 01/14/25 14:49


 


divalproex sodium AdvReac  Anxiety, Verified 01/14/25 14:49





[From Depakote]   weight gain  














Review of Systems


ROS Statement: 


Those systems with pertinent positive or pertinent negative responses have been 

documented in the HPI.





ROS Other: All systems not noted in ROS Statement are negative.





Past Medical History


Past Medical History: Diabetes Mellitus, GERD/Reflux, Hyperlipidemia, 

Hypertension, Sleep Apnea/CPAP/BIPAP


Additional Past Medical History / Comment(s): Family history of premature 

coronary artery disease. OCD.


History of Any Multi-Drug Resistant Organisms: None Reported


Past Surgical History: Hernia Repair


Additional Past Surgical History / Comment(s): Iliostomy reversal 1/6/25


Past Anesthesia/Blood Transfusion Reactions: No Reported Reaction


Past Psychological History: Anxiety, Bipolar, Depression, Schizoaffective 

Disorder


Smoking Status: Never smoker


Past Alcohol Use History: None Reported


Past Drug Use History: None Reported





- Past Family History


  ** Father


Family Medical History: Congestive Heart Failure (CHF), Coronary Artery Disease 

(CAD), Diabetes Mellitus





  ** Mother


Family Medical History: Cancer





General Exam





- General Exam Comments


Initial Comments: 





GENERAL:


Patient is well-developed and well-nourished.  Patient is nontoxic and well-

hydrated and is in mild distress.





ENT:


Neck is soft and supple.  No significant lymphadenopathy is noted.  Oropharynx 

is clear.  Moist mucous membranes.  Neck has full range of motion without 

eliciting any pain.  





EYES:


The sclera were anicteric and conjunctiva were pink and moist.  Extraocular 

movements were intact and pupils were equal round and reactive to light.  

Eyelids were unremarkable.





PULMONARY:


Unlabored respirations.  Good breath sounds bilaterally.  No audible rales 

rhonchi or wheezing was noted.





CARDIOVASCULAR:


There is a regular rate and rhythm without any murmurs gallops or rubs.  





ABDOMEN:


Still has an ostomy.  Next of the ostomy is an incision which is completely 

dehisced





SKIN:


Skin is clear with no lesions or rashes and otherwise unremarkable.





NEUROLOGIC:


Patient is alert and oriented x3.  Cranial nerves II through XII are grossly 

intact.  Motor and sensory are also intact.  Normal speech, volume and content. 

Symmetrical smile. 





MUSCULOSKELETAL:


Normal extremities with adequate strength and full range of motion.  





LYMPHATICS:


No significant lymphadenopathy is noted





PSYCHIATRIC:


Normal psychiatric evaluation.  


Limitations: no limitations





Course


                                   Vital Signs











  01/14/25 01/14/25 01/14/25





  14:24 15:55 16:30


 


Temperature 98.0 F 98.3 F 


 


Pulse Rate 78 77 


 


Respiratory 18 18 18





Rate   


 


Blood Pressure 116/73 110/73 


 


O2 Sat by Pulse 97 94 L 





Oximetry   














  01/14/25 01/14/25





  18:28 19:45


 


Temperature 99.3 F 


 


Pulse Rate 87 89


 


Respiratory 19 16





Rate  


 


Blood Pressure 119/76 132/79


 


O2 Sat by Pulse 94 L 95





Oximetry  














Medical Decision Making





- Medical Decision Making





EKG is interpreted by myself.  EKG shows a sinus rhythm at 72 bpm MT interval is

161  QT interval 414 QTc is 439.  Patient's EKG shows no ST segment 

elevation or depression.





Was pt. sent in by a medical professional or institution (, PA, NP, urgent 

care, hospital, or nursing home...) When possible be specific


@ -No


Did you speak to anyone other than the patient for history (EMS, parent, family,

police, friend...)? What history was obtained from this source 


@ -No


Did you review nursing and triage notes (agree or disagree)? Why? 


@ -I reviewed and agree with nursing and triage notes


Were old charts reviewed (outside hosp., previous admission, EMS record, old 

EKG, old radiological studies, urgent care reports/EKG's, nursing home records)?

Report findings 


@ -No old charts were reviewed


Differential Diagnosis? 


@ -Differential Fever:


Pneumonia, viral URI, endocarditis, myocarditis, pericarditis, otitis, 

sinusitis, peritonsillar Abscess, retropharyngeal Abscess, epiglottitis, 

postsurgical infection, peritonitis, appendicitis, [place] cystitis, diverticu

litis, hepatitis, colitis, UTI, PID, TOA, pyelonephritis, prostatitis, 

epididymitis, meningitis, encephalitis, pulmonary embolism, CVA, thyroid storm, 

pancreatitis, adrenal crisis, cavernous sinus thrombosis, this is not meant to 

be an all-inclusive list. 





EKG interpreted by me (3pts min.).


@ -As above


X-rays interpreted by me (1pt min.).


@ -Chest x-ray shows no acute abnormality


CT interpreted by me (1pt min.).


@ -CT of the abdomen pelvis shows no acute abnormality may be an ileus.


U/S interpreted by me (1pt. min.).


@ -None done


What testing was considered but not performed or refused? (CT, X-rays, U/S, 

labs)? Why?


@ -None


What meds were considered but not given or refused? Why?


@ -None


Did you discuss the management of the patient with other professionals 

(professionals i.e. , PA, NP, lab, RT, psych nurse, , , 

teacher, , )? Give summary


@ -I spoke with Dr. Rodriguez from [facility] and she accepted the 

transfer


Was smoking cessation discussed for >3mins.?


@ -No


Was critical care preformed (if so, how long)?


@ -No


Were there social determinants of health that impacted care today? How? 

(Homelessness, low income, unemployed, alcoholism, drug addiction, 

transportation, low edu. Level, literacy, decrease access to med. care, [facility], 

rehab)?


@ -No


Was there de-escalation of care discussed even if they declined (Discuss DNR or 

withdrawal of care, Hospice)? DNR status


@ -No


What co-morbidities impacted this encounter? (DM, HTN, Smoking, COPD, CAD, 

Cancer, CVA, ARF, Chemo, Hep., AIDS, mental health diagnosis, sleep apnea, 

morbid obesity)?


@ -None


Was patient admitted / discharged? Hospital course, mention meds given and 

route, prescriptions, significant lab abnormalities, going to OR and other 

pertinent info.


@ -Patient's lab work showed a slight white count however no source of infection

was identified.  Patient had cultures of the wound done and it was completely 

dehisced at this time.  Patient had 2 g of Rocephin in the emergency department 

will be transferred to [facility] in [place].


Undiagnosed new problem with uncertain prognosis?


@ -No


Drug Therapy requiring intensive monitoring for toxicity (Heparin, Nitro, 

Insulin, Cardizem)?


@ -No


Were any procedures done?


@ -No


Diagnosis/symptom?


@ -Postoperative fever


Acute, or Chronic, or Acute on Chronic?


@ -Acute


Uncomplicated (without systemic symptoms) or Complicated (systemic symptoms)?


@ -Complicated


Side effects of treatment?


@ -No


Exacerbation, Progression, or Severe Exacerbation?


@ -No


Poses a threat to life or bodily function? How? (Chest pain, USA, MI, pneumonia,

PE, COPD, DKA, ARF, appy, cholecystitis, CVA, Diverticulitis, Homicidal, Suici

kieran, threat to staff... and all critical care pts)


@ -Yes this can lead to sepsis and endorgan dysfunction


Diagnosis/symptom?


@ -Wound dehiscence


Acute, or Chronic, or Acute on Chronic?


@ -Acute


Uncomplicated (without systemic symptoms) or Complicated (systemic symptoms)?


@ -Complicated


Side effects of treatment?


@ -None


Exacerbation, Progression, or Severe Exacerbation]


@ -No


Poses a threat to life or bodily function?


@ -No





- Lab Data


Result diagrams: 


                                 01/14/25 14:57





                                 01/14/25 14:57


                                   Lab Results











  01/14/25 01/14/25 01/14/25 Range/Units





  14:57 14:57 14:57 


 


WBC  14.2 H    (3.8-10.6)  k/uL


 


RBC  3.95 L    (4.30-5.90)  m/uL


 


Hgb  11.8 L D    (13.0-17.5)  gm/dL


 


Hct  35.6 L    (39.0-53.0)  %


 


MCV  90.1    (80.0-100.0)  fL


 


MCH  30.0    (25.0-35.0)  pg


 


MCHC  33.2    (31.0-37.0)  g/dL


 


RDW  13.7    (11.5-15.5)  %


 


Plt Count  412    (150-450)  k/uL


 


MPV  7.1    


 


Neutrophils %  87    %


 


Lymphocytes %  7    %


 


Monocytes %  3    %


 


Eosinophils %  0    %


 


Basophils %  0    %


 


Neutrophils #  12.4 H    (1.3-7.7)  k/uL


 


Lymphocytes #  1.1    (1.0-4.8)  k/uL


 


Monocytes #  0.5    (0-1.0)  k/uL


 


Eosinophils #  0.1    (0-0.7)  k/uL


 


Basophils #  0.1    (0-0.2)  k/uL


 


PT   10.4   (10.0-12.5)  sec


 


INR   0.9   (<1.2)  


 


APTT   23.8   (22.0-30.0)  sec


 


Sodium    139  (137-145)  mmol/L


 


Potassium    3.7  (3.5-5.1)  mmol/L


 


Chloride    101  ()  mmol/L


 


Carbon Dioxide    30  (22-30)  mmol/L


 


Anion Gap    8  mmol/L


 


BUN    15  (9-20)  mg/dL


 


Creatinine    1.21  (0.66-1.25)  mg/dL


 


Est GFR (CKD-EPI)AfAm    77  (>60 ml/min/1.73 sqM)  


 


Est GFR (CKD-EPI)NonAf    66  (>60 ml/min/1.73 sqM)  


 


Glucose    273 H  (74-99)  mg/dL


 


Plasma Lactic Acid Broderick     (0.7-2.0)  mmol/L


 


Calcium    8.9  (8.4-10.2)  mg/dL


 


Total Bilirubin    0.3  (0.2-1.3)  mg/dL


 


AST    19  (17-59)  U/L


 


ALT    16  (4-49)  U/L


 


Alkaline Phosphatase    153 H  ()  U/L


 


Total Protein    6.1 L  (6.3-8.2)  g/dL


 


Albumin    3.2 L  (3.5-5.0)  g/dL


 


Urine Color     


 


Urine Appearance     (Clear)  


 


Urine pH     (5.0-8.0)  


 


Ur Specific Gravity     (1.001-1.035)  


 


Urine Protein     (Negative)  


 


Urine Glucose (UA)     (Negative)  


 


Urine Ketones     (Negative)  


 


Urine Blood     (Negative)  


 


Urine Nitrite     (Negative)  


 


Urine Bilirubin     (Negative)  


 


Urine Urobilinogen     (<2.0)  mg/dL


 


Ur Leukocyte Esterase     (Negative)  


 


Urine RBC     (0-5)  /hpf


 


Urine WBC     (0-5)  /hpf


 


Urine Mucus     (None)  /hpf


 


Influenza Type A (PCR)     (Not Detectd)  


 


Influenza Type B (PCR)     (Not Detectd)  


 


RSV (PCR)     (Not Detectd)  


 


SARS-CoV-2 (PCR)     (Not Detectd)  














  01/14/25 01/14/25 01/14/25 Range/Units





  14:57 15:03 16:11 


 


WBC     (3.8-10.6)  k/uL


 


RBC     (4.30-5.90)  m/uL


 


Hgb     (13.0-17.5)  gm/dL


 


Hct     (39.0-53.0)  %


 


MCV     (80.0-100.0)  fL


 


MCH     (25.0-35.0)  pg


 


MCHC     (31.0-37.0)  g/dL


 


RDW     (11.5-15.5)  %


 


Plt Count     (150-450)  k/uL


 


MPV     


 


Neutrophils %     %


 


Lymphocytes %     %


 


Monocytes %     %


 


Eosinophils %     %


 


Basophils %     %


 


Neutrophils #     (1.3-7.7)  k/uL


 


Lymphocytes #     (1.0-4.8)  k/uL


 


Monocytes #     (0-1.0)  k/uL


 


Eosinophils #     (0-0.7)  k/uL


 


Basophils #     (0-0.2)  k/uL


 


PT     (10.0-12.5)  sec


 


INR     (<1.2)  


 


APTT     (22.0-30.0)  sec


 


Sodium     (137-145)  mmol/L


 


Potassium     (3.5-5.1)  mmol/L


 


Chloride     ()  mmol/L


 


Carbon Dioxide     (22-30)  mmol/L


 


Anion Gap     mmol/L


 


BUN     (9-20)  mg/dL


 


Creatinine     (0.66-1.25)  mg/dL


 


Est GFR (CKD-EPI)AfAm     (>60 ml/min/1.73 sqM)  


 


Est GFR (CKD-EPI)NonAf     (>60 ml/min/1.73 sqM)  


 


Glucose     (74-99)  mg/dL


 


Plasma Lactic Acid Broderick  1.6    (0.7-2.0)  mmol/L


 


Calcium     (8.4-10.2)  mg/dL


 


Total Bilirubin     (0.2-1.3)  mg/dL


 


AST     (17-59)  U/L


 


ALT     (4-49)  U/L


 


Alkaline Phosphatase     ()  U/L


 


Total Protein     (6.3-8.2)  g/dL


 


Albumin     (3.5-5.0)  g/dL


 


Urine Color    Yellow  


 


Urine Appearance    Clear  (Clear)  


 


Urine pH    6.5  (5.0-8.0)  


 


Ur Specific Gravity    1.025  (1.001-1.035)  


 


Urine Protein    1+ H  (Negative)  


 


Urine Glucose (UA)    1+ H  (Negative)  


 


Urine Ketones    Negative  (Negative)  


 


Urine Blood    Negative  (Negative)  


 


Urine Nitrite    Negative  (Negative)  


 


Urine Bilirubin    Negative  (Negative)  


 


Urine Urobilinogen    2.0  (<2.0)  mg/dL


 


Ur Leukocyte Esterase    Negative  (Negative)  


 


Urine RBC    4  (0-5)  /hpf


 


Urine WBC    1  (0-5)  /hpf


 


Urine Mucus    Rare H  (None)  /hpf


 


Influenza Type A (PCR)   Not Detected   (Not Detectd)  


 


Influenza Type B (PCR)   Not Detected   (Not Detectd)  


 


RSV (PCR)   Not Detected   (Not Detectd)  


 


SARS-CoV-2 (PCR)   Not Detected   (Not Detectd)  














Disposition


Clinical Impression: 


 Wound dehiscence, Postoperative fever





Disposition: OTHER INSTITUTION NOT DEFINED


Referrals: 


Stephanie Toro DO [Primary Care Provider] - 1-2 days


Time of Disposition: 20:13





- Out of Hospital Transfer - Req. Specs


Out of Hospital Transfer - Requested Specifics: Other Emergency Center ([facility])

## 2025-01-14 NOTE — XR
EXAMINATION TYPE: XR chest 2V

 

DATE OF EXAM: 1/14/2025 3:55 PM

 

COMPARISON: Prior exam 

 

CLINICAL INDICATION: Male, 59 years old with history of Fever,

 

TECHNIQUE: XR chest 2V view(s) obtained.

 

FINDINGS:  

The heart size is relatively enlarged

The pulmonary vasculature is normal.

The lungs are clear.

 

IMPRESSION:  

1. Mild cardiomegaly

 

[facility], Workstation: [id number], 1/14/2025 4:19 PM

## 2025-06-25 NOTE — P.PN
Subjective


Progress Note Date: 03/16/23





Hospital Course: 


Patient is a very pleasant 57-year-old male with a past medical history of type 

II non-insulin-dependent diabetes mellitus, hypertension, hyperlipidemia, GERD, 

schizoaffective disorder, anxiety, bipolar, depression, and previous inguinal 

hernia with repair.  Presented to the emergency department with a chief 

complaint of abdominal/epigastric/chest pain, nausea, vomiting, abdominal 

distention and constipation 2 weeks.  Patient reports vomiting dark black 

coffee-colored emesis and inability to have a bowel movement 2 weeks.  He 

reports pain institute diffuse abdomen radiating up into his epigastric region 

and chest.  He denies having any fevers, chills, palpitations, shortness of 

breath, cough or congestion, hemoptysis, melena, or hematochezia.  Patient 

reports decreased appetite and inability to hold down oral intake.  He does 

report decreased urinary output, but denies any retention or difficulties 

initiating a stream.  Reports concerns of dehydration.  Patient underwent full 

evaluation in the emergency department.  Labs completed and reviewed.  He was 

found to have leukocytosis with WBC count of 18.1 and normocytic anemia with 

hemoglobin of 12.6.  Coagulation profile normal findings.  BMP revealing 

hyponatremia with sodium 132 and hypochloremia with chloride 95 and acute kidney

injury with BUN 59, creatinine 2.11, and GFR of 34 as well as hyperglycemia with

glucose of 252.  Troponin less than 0.012.  EKG showing sinus tachycardia at 102

bpm with T-wave inversion in lateral leads I and aVL, T-wave inversion is new 

finding when compared to previous EKG completed 2/3/20.  Chest x-ray completed 

and report reviewed stating limited inspiration with bilateral consolidation and

right basilar atelectasis or infiltrate.  CT abdomen and pelvis without contrast

completed and radiology report reviewed showing dilated small bowel, 

cecum/ascending colon with transition point within the proximal third of the 

transverse colon with focal narrowing in none distention of the remainder of the

colon, findings possibly representing stricture versus mass, small bowel 

dilation with small bowel feces throughout suggesting delayed transit/fecal 

stasis and dilated stomach and esophagus filled with ingested contents.  

Discussed these  findings with the ED physicianin detail and patient was 

accepted for admission to general surgery unit with telemetry under our services

with consultation to general surgery.  Patient taken for colectomy on 3/7/23.  

Awaiting for return of bowel function.  Repeat abdominal x-ray completed on 

3/12/23 reviewed and radiology report showing nonspecific bowel gas pattern 

without radiographic evidence for acute process with persistent few gaseous 

dilated loops of bowel remaining.  Patient currently having bowel movements, 

able to tolerate some oral intake.  Urine output slowly declining.








Subjective: 


Patient seen and examined at bedside.  No acute events overnight.  Denies any 

abdominal pain.  Denies any significant nausea or vomiting.  Continues to have 

urine output, claims that it is decreasing.  He is having bowel movements.  

Denies any chest pain, shortness of breath, palpitations, lightheadedness.





Pertinent positives and negatives as discussed above, a complete review of 

systems was performed and all other systems are negative.








Vitals Signs Reviewed. 





General: nontoxic, no distress, appears at stated age


Derm: warm, dry, dressing is dry, intact, clean


Head: atraumatic, normocephalic, symmetric


Eyes: EOMI, no lid lag, anicteric sclera


Mouth: no lip lesion, mucus membranes moist


Cardiovascular: S1S2 reg, no murmur


Lungs: CTA bilateral, no rhonchi, no rales , no accessory muscle use


Abdominal: soft,  nontender to palpation, no guarding, no appreciable 

organomegaly


Ext: no gross muscle atrophy, no edema, no contractures


Neuro:  CN II-XI grossly intact, no focal neuro deficits


Psych: Alert, oriented, appropriate affect 





Data review today:


Lab data: WBC 8.6, hemoglobin 9.8, sodium 142, potassium 3.3, chloride 113, 

bicarb 24, blood sugars ranged between 210-256





Assessment and Plan:





Active:


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


Hypokalemia


Small bowel obstruction, Dilated proximal transverse right colon status post 

right colectomy and partial omentectomy on 3/23


Acute Postoperative blood loss anemia, expected finding and stable and improving


Diabetes mellitus with hyperglycemia


Schizoaffective disorder


Anxiety and depression


OCD


Bipolar disorder





-Urine output has been stable at 1 L, continue to encourage oral intake


-Nephrology is following, desmopressin 0.05 mg by mouth twice a day


-Patient was given 80 mEq oral potassium by nephrology


Repeat BMP tomorrow-


-Surgery following, patient tolerating solids


-Remains on IV cefepime 2 g every 8 hours, Flagyl 500 mg 3 times a day


-Hemoglobin improving


-On Norco 5 every 4 hours when necessary, Dilaudid 1 mg IV every 3 hours as 

needed, no doses needed today


-A1c was 6.9, currently on low-dose sliding scale insulin, Levemir 5 units at 

night added


-Psychiatry following, on cause pain, citalopram, Lamictal





Resolved:


Acute kidney injury


Hyponatremia


Hypochloremia


Hypernatremia


Non-anion gap Metabolic acidosis


Leukocytosis, reactive





DVT ppx: Subcu heparin





Code status: Full Code





Anticipated discharge place: rehab


Anticipated discharge time: 1-2 days





Objective





- Vital Signs


Vital signs: 


                                   Vital Signs











Temp  97.8 F   03/16/23 04:00


 


Pulse  86   03/16/23 11:53


 


Resp  16   03/16/23 11:53


 


BP  112/72   03/16/23 11:53


 


Pulse Ox  93 L  03/16/23 11:53


 


FiO2      








                                 Intake & Output











 03/15/23 03/16/23 03/16/23





 18:59 06:59 18:59


 


Intake Total 700  137


 


Output Total 2725 2225 550


 


Balance -2025 -2225 -413


 


Weight 92 kg  


 


Intake:   


 


  Oral 700  137


 


Output:   


 


  Urine 2725 2225 550


 


Other:   


 


  Voiding Method Urinal Urinal Urinal


 


  # Bowel Movements 1 1 














- Labs


CBC & Chem 7: 


                                 03/16/23 09:50





                                 03/16/23 09:50


Labs: 


                  Abnormal Lab Results - Last 24 Hours (Table)











  03/15/23 03/16/23 03/16/23 Range/Units





  20:12 06:04 09:50 


 


RBC     (4.30-5.90)  m/uL


 


Hgb     (13.0-17.5)  gm/dL


 


Hct     (39.0-53.0)  %


 


Potassium    3.3 L  (3.5-5.1)  mmol/L


 


Chloride    113 H  ()  mmol/L


 


BUN    6 L  (9-20)  mg/dL


 


Glucose    165 H  (74-99)  mg/dL


 


POC Glucose (mg/dL)  220 H  237 H   ()  mg/dL


 


Calcium    8.3 L  (8.4-10.2)  mg/dL














  03/16/23 03/16/23 Range/Units





  09:50 11:40 


 


RBC  3.39 L   (4.30-5.90)  m/uL


 


Hgb  9.8 L   (13.0-17.5)  gm/dL


 


Hct  30.3 L   (39.0-53.0)  %


 


Potassium    (3.5-5.1)  mmol/L


 


Chloride    ()  mmol/L


 


BUN    (9-20)  mg/dL


 


Glucose    (74-99)  mg/dL


 


POC Glucose (mg/dL)   210 H  ()  mg/dL


 


Calcium    (8.4-10.2)  mg/dL Tried calling patient I was unable to leave a message as the voicemail box was full I will send a message to her through her my chart. I will also try again in a little bit and see if there are any other numbers to call.